# Patient Record
Sex: MALE | Race: OTHER | HISPANIC OR LATINO | URBAN - METROPOLITAN AREA
[De-identification: names, ages, dates, MRNs, and addresses within clinical notes are randomized per-mention and may not be internally consistent; named-entity substitution may affect disease eponyms.]

---

## 2017-06-10 ENCOUNTER — OUTPATIENT (OUTPATIENT)
Dept: OUTPATIENT SERVICES | Facility: HOSPITAL | Age: 77
LOS: 1 days | End: 2017-06-10
Payer: MEDICARE

## 2017-06-10 ENCOUNTER — APPOINTMENT (OUTPATIENT)
Dept: CT IMAGING | Facility: CLINIC | Age: 77
End: 2017-06-10

## 2017-06-10 DIAGNOSIS — D35.2 BENIGN NEOPLASM OF PITUITARY GLAND: ICD-10-CM

## 2017-06-10 DIAGNOSIS — Z87.19 PERSONAL HISTORY OF OTHER DISEASES OF THE DIGESTIVE SYSTEM: Chronic | ICD-10-CM

## 2017-06-10 DIAGNOSIS — I05.9 RHEUMATIC MITRAL VALVE DISEASE, UNSPECIFIED: Chronic | ICD-10-CM

## 2017-06-10 PROCEDURE — 70470 CT HEAD/BRAIN W/O & W/DYE: CPT

## 2017-06-10 PROCEDURE — 82565 ASSAY OF CREATININE: CPT

## 2017-06-15 ENCOUNTER — APPOINTMENT (OUTPATIENT)
Dept: NEUROSURGERY | Facility: CLINIC | Age: 77
End: 2017-06-15

## 2017-06-15 VITALS
HEART RATE: 63 BPM | DIASTOLIC BLOOD PRESSURE: 80 MMHG | BODY MASS INDEX: 32.04 KG/M2 | SYSTOLIC BLOOD PRESSURE: 142 MMHG | HEIGHT: 64.57 IN | OXYGEN SATURATION: 98 % | TEMPERATURE: 98 F | WEIGHT: 190 LBS

## 2017-06-15 DIAGNOSIS — Z86.39 PERSONAL HISTORY OF OTHER ENDOCRINE, NUTRITIONAL AND METABOLIC DISEASE: ICD-10-CM

## 2017-06-15 DIAGNOSIS — Z78.9 OTHER SPECIFIED HEALTH STATUS: ICD-10-CM

## 2019-01-18 ENCOUNTER — APPOINTMENT (OUTPATIENT)
Dept: NEUROSURGERY | Facility: CLINIC | Age: 79
End: 2019-01-18
Payer: MEDICARE

## 2019-01-18 VITALS
HEIGHT: 64 IN | WEIGHT: 190 LBS | TEMPERATURE: 97.5 F | BODY MASS INDEX: 32.44 KG/M2 | HEART RATE: 83 BPM | DIASTOLIC BLOOD PRESSURE: 66 MMHG | SYSTOLIC BLOOD PRESSURE: 163 MMHG

## 2019-01-18 PROCEDURE — 99214 OFFICE O/P EST MOD 30 MIN: CPT

## 2019-01-18 NOTE — REVIEW OF SYSTEMS
[Feeling Tired] : feeling tired [As Noted in HPI] : as noted in HPI [Negative] : Heme/Lymph [FreeTextEntry3] : cataract

## 2019-01-18 NOTE — ASSESSMENT
[FreeTextEntry1] : Mr. Rodriguez presents with above history.  His symptoms remain stable.  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  He will also obtain visual field testing as well.  He should followup thereafter for formal review.  He and his wife know to call the office if there are any new or worsening symptoms.

## 2019-01-18 NOTE — PHYSICAL EXAM
[General Appearance - Alert] : alert [General Appearance - In No Acute Distress] : in no acute distress [Oriented To Time, Place, And Person] : oriented to person, place, and time [Impaired Insight] : insight and judgment were intact [Over the Past 2 Weeks, Have You Felt Down, Depressed, or Hopeless?] : 1.) Over the past 2 weeks, have you felt down, depressed, or hopeless? No [Over the Past 2 Weeks, Have You Felt Little Interest or Pleasure Doing Things?] : 2.) Over the past 2 weeks, have you felt little interest or pleasure doing things? No [Person] : oriented to person [Place] : oriented to place [Time] : oriented to time [Concentration Intact] : normal concentrating ability [Comprehension] : comprehension intact [Cranial Nerves Optic (II)] : visual acuity intact bilaterally,  pupils equal round and reactive to light [Cranial Nerves Oculomotor (III)] : extraocular motion intact [Cranial Nerves Trigeminal (V)] : facial sensation intact symmetrically [Cranial Nerves Glossopharyngeal (IX)] : tongue and palate midline [Cranial Nerves Accessory (XI - Cranial And Spinal)] : head turning and shoulder shrug symmetric [Cranial Nerves Hypoglossal (XII)] : there was no tongue deviation with protrusion [Motor Tone] : muscle tone was normal in all four extremities [Motor Strength] : muscle strength was normal in all four extremities [Sensation Tactile Decrease] : light touch was intact [Sensation Pain / Temperature Decrease] : pain and temperature was intact [FreeTextEntry9] : ambulates with the assistance of a walker.  [No Visual Abnormalities] : no visible abnormailities

## 2019-01-18 NOTE — REASON FOR VISIT
[Follow-Up: _____] : a [unfilled] follow-up visit [FreeTextEntry1] : Mr. Rodriguez presents today for follow up visit.    At today's visit, he denies any headaches complaints, n/v. Visual disturbance include history of cataract surgery and wears glasses for reading. He has never had a seizure. He denies any numbness/tingling or weakness of the extremities. No difficulty with ambulation or balance disturbances. Visual field testing performed 3/2017. VF;s were stable at that time.  He has not been evaluated by endocrine for several years. Patient has a pacemaker. He is currently managed on Coumadin for anticoagulation. As per patient, he is doing well. His daughter Aida was on speaker phone during visit and agrees from her perspective there are no new concerns. \par

## 2019-01-30 ENCOUNTER — OUTPATIENT (OUTPATIENT)
Dept: OUTPATIENT SERVICES | Facility: HOSPITAL | Age: 79
LOS: 1 days | End: 2019-01-30
Payer: MEDICARE

## 2019-01-30 ENCOUNTER — APPOINTMENT (OUTPATIENT)
Dept: CT IMAGING | Facility: CLINIC | Age: 79
End: 2019-01-30
Payer: MEDICARE

## 2019-01-30 DIAGNOSIS — D35.2 BENIGN NEOPLASM OF PITUITARY GLAND: ICD-10-CM

## 2019-01-30 DIAGNOSIS — Z87.19 PERSONAL HISTORY OF OTHER DISEASES OF THE DIGESTIVE SYSTEM: Chronic | ICD-10-CM

## 2019-01-30 DIAGNOSIS — I05.9 RHEUMATIC MITRAL VALVE DISEASE, UNSPECIFIED: Chronic | ICD-10-CM

## 2019-01-30 PROCEDURE — 70470 CT HEAD/BRAIN W/O & W/DYE: CPT | Mod: 26

## 2019-01-30 PROCEDURE — 82565 ASSAY OF CREATININE: CPT

## 2019-01-30 PROCEDURE — 70470 CT HEAD/BRAIN W/O & W/DYE: CPT

## 2019-06-01 ENCOUNTER — OUTPATIENT (OUTPATIENT)
Dept: OUTPATIENT SERVICES | Facility: HOSPITAL | Age: 79
LOS: 1 days | End: 2019-06-01
Payer: MEDICARE

## 2019-06-01 DIAGNOSIS — I05.9 RHEUMATIC MITRAL VALVE DISEASE, UNSPECIFIED: Chronic | ICD-10-CM

## 2019-06-01 DIAGNOSIS — Z87.19 PERSONAL HISTORY OF OTHER DISEASES OF THE DIGESTIVE SYSTEM: Chronic | ICD-10-CM

## 2019-06-18 ENCOUNTER — EMERGENCY (EMERGENCY)
Facility: HOSPITAL | Age: 79
LOS: 1 days | Discharge: HOME | End: 2019-06-18
Attending: EMERGENCY MEDICINE
Payer: MEDICARE

## 2019-06-18 VITALS
HEART RATE: 60 BPM | RESPIRATION RATE: 20 BRPM | TEMPERATURE: 98 F | DIASTOLIC BLOOD PRESSURE: 73 MMHG | WEIGHT: 177.91 LBS | HEIGHT: 65 IN | OXYGEN SATURATION: 98 % | SYSTOLIC BLOOD PRESSURE: 132 MMHG

## 2019-06-18 DIAGNOSIS — Z87.19 PERSONAL HISTORY OF OTHER DISEASES OF THE DIGESTIVE SYSTEM: Chronic | ICD-10-CM

## 2019-06-18 DIAGNOSIS — I05.9 RHEUMATIC MITRAL VALVE DISEASE, UNSPECIFIED: Chronic | ICD-10-CM

## 2019-06-18 LAB
ALBUMIN SERPL ELPH-MCNC: 3.9 G/DL — SIGNIFICANT CHANGE UP (ref 3.3–5.2)
ALP SERPL-CCNC: 95 U/L — SIGNIFICANT CHANGE UP (ref 40–120)
ALT FLD-CCNC: 18 U/L — SIGNIFICANT CHANGE UP
ANION GAP SERPL CALC-SCNC: 10 MMOL/L — SIGNIFICANT CHANGE UP (ref 5–17)
APTT BLD: 48.6 SEC — HIGH (ref 27.5–36.3)
AST SERPL-CCNC: 24 U/L — SIGNIFICANT CHANGE UP
BILIRUB SERPL-MCNC: 0.8 MG/DL — SIGNIFICANT CHANGE UP (ref 0.4–2)
BUN SERPL-MCNC: 18 MG/DL — SIGNIFICANT CHANGE UP (ref 8–20)
CALCIUM SERPL-MCNC: 9.5 MG/DL — SIGNIFICANT CHANGE UP (ref 8.6–10.2)
CHLORIDE SERPL-SCNC: 100 MMOL/L — SIGNIFICANT CHANGE UP (ref 98–107)
CO2 SERPL-SCNC: 26 MMOL/L — SIGNIFICANT CHANGE UP (ref 22–29)
CREAT SERPL-MCNC: 1.03 MG/DL — SIGNIFICANT CHANGE UP (ref 0.5–1.3)
GLUCOSE SERPL-MCNC: 84 MG/DL — SIGNIFICANT CHANGE UP (ref 70–115)
HCT VFR BLD CALC: 43.1 % — SIGNIFICANT CHANGE UP (ref 42–52)
HGB BLD-MCNC: 13.7 G/DL — LOW (ref 14–18)
INR BLD: 2.98 RATIO — HIGH (ref 0.88–1.16)
MCHC RBC-ENTMCNC: 27.1 PG — SIGNIFICANT CHANGE UP (ref 27–31)
MCHC RBC-ENTMCNC: 31.8 G/DL — LOW (ref 32–36)
MCV RBC AUTO: 85.3 FL — SIGNIFICANT CHANGE UP (ref 80–94)
PLATELET # BLD AUTO: 183 K/UL — SIGNIFICANT CHANGE UP (ref 150–400)
POTASSIUM SERPL-MCNC: 4.1 MMOL/L — SIGNIFICANT CHANGE UP (ref 3.5–5.3)
POTASSIUM SERPL-SCNC: 4.1 MMOL/L — SIGNIFICANT CHANGE UP (ref 3.5–5.3)
PROT SERPL-MCNC: 7.7 G/DL — SIGNIFICANT CHANGE UP (ref 6.6–8.7)
PROTHROM AB SERPL-ACNC: 35.5 SEC — HIGH (ref 10–12.9)
RBC # BLD: 5.05 M/UL — SIGNIFICANT CHANGE UP (ref 4.6–6.2)
RBC # FLD: 15.7 % — HIGH (ref 11–15.6)
SODIUM SERPL-SCNC: 136 MMOL/L — SIGNIFICANT CHANGE UP (ref 135–145)
WBC # BLD: 6.2 K/UL — SIGNIFICANT CHANGE UP (ref 4.8–10.8)
WBC # FLD AUTO: 6.2 K/UL — SIGNIFICANT CHANGE UP (ref 4.8–10.8)

## 2019-06-18 PROCEDURE — 85027 COMPLETE CBC AUTOMATED: CPT

## 2019-06-18 PROCEDURE — 99284 EMERGENCY DEPT VISIT MOD MDM: CPT | Mod: 25

## 2019-06-18 PROCEDURE — 96374 THER/PROPH/DIAG INJ IV PUSH: CPT | Mod: XU

## 2019-06-18 PROCEDURE — 72131 CT LUMBAR SPINE W/O DYE: CPT | Mod: 26

## 2019-06-18 PROCEDURE — 71250 CT THORAX DX C-: CPT | Mod: 26

## 2019-06-18 PROCEDURE — 74174 CTA ABD&PLVS W/CONTRAST: CPT | Mod: 26

## 2019-06-18 PROCEDURE — 93971 EXTREMITY STUDY: CPT

## 2019-06-18 PROCEDURE — 93971 EXTREMITY STUDY: CPT | Mod: 26,LT

## 2019-06-18 PROCEDURE — 74174 CTA ABD&PLVS W/CONTRAST: CPT

## 2019-06-18 PROCEDURE — 72131 CT LUMBAR SPINE W/O DYE: CPT

## 2019-06-18 PROCEDURE — 71250 CT THORAX DX C-: CPT

## 2019-06-18 PROCEDURE — 85730 THROMBOPLASTIN TIME PARTIAL: CPT

## 2019-06-18 PROCEDURE — 72125 CT NECK SPINE W/O DYE: CPT | Mod: 26

## 2019-06-18 PROCEDURE — 72125 CT NECK SPINE W/O DYE: CPT

## 2019-06-18 PROCEDURE — 70450 CT HEAD/BRAIN W/O DYE: CPT

## 2019-06-18 PROCEDURE — 85610 PROTHROMBIN TIME: CPT

## 2019-06-18 PROCEDURE — 80053 COMPREHEN METABOLIC PANEL: CPT

## 2019-06-18 PROCEDURE — 73590 X-RAY EXAM OF LOWER LEG: CPT

## 2019-06-18 PROCEDURE — 73590 X-RAY EXAM OF LOWER LEG: CPT | Mod: 26,LT

## 2019-06-18 PROCEDURE — 99284 EMERGENCY DEPT VISIT MOD MDM: CPT

## 2019-06-18 PROCEDURE — 36415 COLL VENOUS BLD VENIPUNCTURE: CPT

## 2019-06-18 PROCEDURE — 70450 CT HEAD/BRAIN W/O DYE: CPT | Mod: 26

## 2019-06-18 RX ORDER — FENTANYL CITRATE 50 UG/ML
25 INJECTION INTRAVENOUS ONCE
Refills: 0 | Status: DISCONTINUED | OUTPATIENT
Start: 2019-06-18 | End: 2019-06-18

## 2019-06-18 RX ADMIN — FENTANYL CITRATE 25 MICROGRAM(S): 50 INJECTION INTRAVENOUS at 13:11

## 2019-06-18 NOTE — ED ADULT NURSE NOTE - INTEGUMENTARY WDL
Color consistent with ethnicity/race, left lower extremity discoloration, warm, dry intact, resilient.

## 2019-06-18 NOTE — ED STATDOCS - PROGRESS NOTE DETAILS
PA Note: PT evaluated by intake physician. HPI/PE/ROS as noted above. Will follow up plan per intake physician. PA Note: CT head/spine/chest and xray wnl, evidence of expanding abdominal aneurysm, will order CTangio to further evaluate. Pt with evidence of PA Note: CT angio showing aneurysm 4.7cm, pt informed of this and instructed on need to follow up with vascular surgeon. Pt acknowledged understanding. [place]: pt evaluated, states he feels improved. Abd is soft. Aneurysm < 5 cm and stable for outpt vascular f/u. Pt apparently reported top family that he had chest pain overnight, but he states, clearly and repeatedly, that he had back soreness upon deep inspiration, likely secondary to fall, stable for dc PA Note: CT head/spine/chest and xray wnl, evidence of expanding abdominal aneurysm, will order CTangio to further evaluate. Pt with evidence of pituitary macroadenoma which pt is aware of, follows up with endocrinologist for this.

## 2019-06-18 NOTE — ED STATDOCS - PMH
Afib    CHF (congestive heart failure)    HTN (hypertension)    Mitral valve disease    Respiratory insufficiency

## 2019-06-18 NOTE — ED STATDOCS - ATTENDING CONTRIBUTION TO CARE
IAllen, performed the initial face to face bedside interview with this patient regarding history of present illness, review of symptoms and relevant past medical, social and family history.  I completed an independent physical examination.  I was the initial provider who evaluated this patient. I have signed out the follow up of any pending tests (i.e. labs, radiological studies) to the ACP.  I have communicated the patient’s plan of care and disposition with the ACP. I, Nader Saravia, performed the initial face to face bedside interview with this patient regarding history of present illness, review of symptoms and relevant past medical, social and family history.  I completed an independent physical examination.  I was the initial provider who evaluated this patient. I have signed out the follow up of any pending tests (i.e. labs, radiological studies) to the ACP.  I have communicated the patient’s plan of care and disposition with the ACP.  The history, relevant review of systems, past medical and surgical history, medical decision making, and physical examination was documented by the scribe in my presence and I attest to the accuracy of the documentation.

## 2019-06-18 NOTE — ED ADULT NURSE NOTE - OBJECTIVE STATEMENT
patient fell at home from standing height last night. patient on coumadin and c/o head, neck and back pain. patient is alert and oriented x4, wife at the bedside. normally walks home with a walker. patient denies LOC, no sign of trauma. patient does also complain of right lower leg pain and inflammation with discoloration. MD Saravia aware.

## 2019-06-18 NOTE — ED STATDOCS - OBJECTIVE STATEMENT
79 y/o M w/ PMHx of AFib, HTN, pacemaker, mitral valve replacement and CHF presents to ED c/o neck and back pain s/p fall last night. Pt states he fell backwards hitting hit head yesterday and has continued pain this AM prompting him to visit the ED. Denies LOC, N/V, fever, chills, CP, SOB, or any other complaints at this time.

## 2019-06-18 NOTE — ED ADULT TRIAGE NOTE - CHIEF COMPLAINT QUOTE
fell last nigh while walking. fell down 2 steps, hit middle back and head. No bleeding.  No LOC. Takes coumadin, hit head during the fall.

## 2019-06-19 DIAGNOSIS — Z71.89 OTHER SPECIFIED COUNSELING: ICD-10-CM

## 2019-06-20 ENCOUNTER — APPOINTMENT (OUTPATIENT)
Dept: VASCULAR SURGERY | Facility: CLINIC | Age: 79
End: 2019-06-20
Payer: MEDICARE

## 2019-06-20 VITALS
SYSTOLIC BLOOD PRESSURE: 143 MMHG | TEMPERATURE: 97.8 F | OXYGEN SATURATION: 98 % | HEART RATE: 68 BPM | DIASTOLIC BLOOD PRESSURE: 70 MMHG | HEIGHT: 64 IN

## 2019-06-20 DIAGNOSIS — Z95.0 PRESENCE OF CARDIAC PACEMAKER: ICD-10-CM

## 2019-06-20 DIAGNOSIS — Z86.79 PERSONAL HISTORY OF OTHER DISEASES OF THE CIRCULATORY SYSTEM: ICD-10-CM

## 2019-06-20 DIAGNOSIS — Z84.1 FAMILY HISTORY OF DISORDERS OF KIDNEY AND URETER: ICD-10-CM

## 2019-06-20 DIAGNOSIS — Z82.49 FAMILY HISTORY OF ISCHEMIC HEART DISEASE AND OTHER DISEASES OF THE CIRCULATORY SYSTEM: ICD-10-CM

## 2019-06-20 PROCEDURE — 99203 OFFICE O/P NEW LOW 30 MIN: CPT

## 2019-06-21 PROBLEM — Z95.0 HISTORY OF CARDIAC PACEMAKER: Status: RESOLVED | Noted: 2019-06-20 | Resolved: 2019-06-21

## 2019-06-21 PROBLEM — Z82.49 FAMILY HISTORY OF CARDIAC PACEMAKER: Status: ACTIVE | Noted: 2019-06-20

## 2019-06-21 PROBLEM — Z86.79 HISTORY OF CARDIAC DISORDER: Status: RESOLVED | Noted: 2019-06-20 | Resolved: 2019-06-21

## 2019-06-21 PROBLEM — Z82.49 FAMILY HISTORY OF CARDIAC DISORDER: Status: ACTIVE | Noted: 2019-06-20

## 2019-06-21 PROBLEM — Z84.1 FAMILY HISTORY OF KIDNEY DISEASE: Status: ACTIVE | Noted: 2019-06-20

## 2019-06-21 RX ORDER — MULTIVIT-MIN/FOLIC/VIT K/LYCOP 400-300MCG
500 TABLET ORAL
Refills: 0 | Status: ACTIVE | COMMUNITY

## 2019-06-21 RX ORDER — GABAPENTIN 300 MG/1
300 CAPSULE ORAL
Refills: 0 | Status: ACTIVE | COMMUNITY

## 2019-06-21 RX ORDER — FERROUS SULFATE 325(65) MG
324 TABLET ORAL
Refills: 0 | Status: ACTIVE | COMMUNITY

## 2019-06-21 RX ORDER — DOCUSATE SODIUM 100 MG
100 TABLET ORAL
Refills: 0 | Status: ACTIVE | COMMUNITY

## 2019-06-21 RX ORDER — FUROSEMIDE 20 MG/1
20 TABLET ORAL
Refills: 0 | Status: ACTIVE | COMMUNITY

## 2019-06-21 RX ORDER — DIGOXIN 250 UG/1
250 TABLET ORAL
Refills: 0 | Status: ACTIVE | COMMUNITY

## 2019-06-21 RX ORDER — ERGOCALCIFEROL 1.25 MG/1
1.25 MG CAPSULE ORAL
Refills: 0 | Status: ACTIVE | COMMUNITY

## 2019-06-21 RX ORDER — PANTOPRAZOLE 40 MG/1
40 TABLET, DELAYED RELEASE ORAL
Refills: 0 | Status: ACTIVE | COMMUNITY

## 2019-06-21 NOTE — HISTORY OF PRESENT ILLNESS
[FreeTextEntry1] : 78 year old male referred for a AAA. He had a recent trip to 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 Family states, they knew about the aneurysm but previously did not follow up. He has an old noncontrast CT from 2015, which showed the AAA measuring 4cm in diameter. Updated CT now from June 2019 - measures 4.7cm.\par \par On questioning, he denies any hx of claudication. He has a prior remote stroke several years back and with residual significant L sided hemiparesis. He ambulates some with a walker, but functional status is limited. He denies any wounds or ulcers. Denies any leg pain at rest. Denies any back or abdominal pain. He also has a hx of afib, on coumadin. Also on ASA. Here for initial evaluation

## 2019-06-21 NOTE — PHYSICAL EXAM
[Normal Rate and Rhythm] : normal rate and rhythm [Respiratory Effort] : normal respiratory effort [0] : left 0 [Ankle Swelling (On Exam)] : present [Ankle Swelling On The Left] : moderate [] : bilaterally [Ankle Swelling On The Right] : mild [No HSM] : no hepatosplenomegaly [Alert] : alert [Oriented to Person] : oriented to person [Oriented to Place] : oriented to place [Oriented to Time] : oriented to time [Calm] : calm [JVD] : no jugular venous distention  [Varicose Veins Of Lower Extremities] : not present [Carotid Bruits] : no carotid bruits [Abdomen Masses] : No abdominal masses [Abdominal Bruit] : no abdominal bruit  [Abdomen Tenderness] : ~T ~M No abdominal tenderness [de-identified] : soft, nt/nd [FreeTextEntry1] : Monophasic pedal signals heard bilaterally [de-identified] : NAD, obese [de-identified] : AVIS VIVAS [de-identified] : LLE weakness/paresis c/w his prior stroke [de-identified] : no ulcers or lesions. LLE with mild to moderate edema with some hyperpigmentation present c/w venous stasis. R side also with stasis changes but free of edema. No ulcers or weeping. Feet warm bilaterally with signals present. Absent BLE pulses as noted above

## 2020-01-21 ENCOUNTER — APPOINTMENT (OUTPATIENT)
Dept: VASCULAR SURGERY | Facility: CLINIC | Age: 80
End: 2020-01-21
Payer: MEDICARE

## 2020-01-21 VITALS
OXYGEN SATURATION: 98 % | HEIGHT: 64 IN | DIASTOLIC BLOOD PRESSURE: 71 MMHG | TEMPERATURE: 98.1 F | SYSTOLIC BLOOD PRESSURE: 151 MMHG | HEART RATE: 65 BPM

## 2020-01-21 DIAGNOSIS — Z09 ENCOUNTER FOR FOLLOW-UP EXAMINATION AFTER COMPLETED TREATMENT FOR CONDITIONS OTHER THAN MALIGNANT NEOPLASM: ICD-10-CM

## 2020-01-21 DIAGNOSIS — I48.91 UNSPECIFIED ATRIAL FIBRILLATION: ICD-10-CM

## 2020-01-21 DIAGNOSIS — K21.9 GASTRO-ESOPHAGEAL REFLUX DISEASE W/OUT ESOPHAGITIS: ICD-10-CM

## 2020-01-21 DIAGNOSIS — I10 ESSENTIAL (PRIMARY) HYPERTENSION: ICD-10-CM

## 2020-01-21 PROCEDURE — 93978 VASCULAR STUDY: CPT

## 2020-01-21 PROCEDURE — 99213 OFFICE O/P EST LOW 20 MIN: CPT

## 2020-01-24 ENCOUNTER — FORM ENCOUNTER (OUTPATIENT)
Age: 80
End: 2020-01-24

## 2020-01-25 ENCOUNTER — OUTPATIENT (OUTPATIENT)
Dept: OUTPATIENT SERVICES | Facility: HOSPITAL | Age: 80
LOS: 1 days | End: 2020-01-25
Payer: MEDICARE

## 2020-01-25 ENCOUNTER — APPOINTMENT (OUTPATIENT)
Dept: CT IMAGING | Facility: CLINIC | Age: 80
End: 2020-01-25
Payer: MEDICARE

## 2020-01-25 DIAGNOSIS — Z00.8 ENCOUNTER FOR OTHER GENERAL EXAMINATION: ICD-10-CM

## 2020-01-25 DIAGNOSIS — Z87.19 PERSONAL HISTORY OF OTHER DISEASES OF THE DIGESTIVE SYSTEM: Chronic | ICD-10-CM

## 2020-01-25 DIAGNOSIS — I05.9 RHEUMATIC MITRAL VALVE DISEASE, UNSPECIFIED: Chronic | ICD-10-CM

## 2020-01-25 DIAGNOSIS — I48.91 UNSPECIFIED ATRIAL FIBRILLATION: ICD-10-CM

## 2020-01-25 DIAGNOSIS — I71.4 ABDOMINAL AORTIC ANEURYSM, WITHOUT RUPTURE: ICD-10-CM

## 2020-01-25 PROCEDURE — 75635 CT ANGIO ABDOMINAL ARTERIES: CPT

## 2020-01-25 PROCEDURE — 75635 CT ANGIO ABDOMINAL ARTERIES: CPT | Mod: 26

## 2020-03-18 ENCOUNTER — INPATIENT (INPATIENT)
Facility: HOSPITAL | Age: 80
LOS: 13 days | Discharge: NURSING, REHAB OR LONG-TERM CARE | DRG: 177 | End: 2020-04-01
Attending: INTERNAL MEDICINE | Admitting: HOSPITALIST
Payer: MEDICARE

## 2020-03-18 VITALS
RESPIRATION RATE: 18 BRPM | OXYGEN SATURATION: 95 % | HEART RATE: 66 BPM | DIASTOLIC BLOOD PRESSURE: 84 MMHG | TEMPERATURE: 97 F | SYSTOLIC BLOOD PRESSURE: 163 MMHG

## 2020-03-18 DIAGNOSIS — Z87.19 PERSONAL HISTORY OF OTHER DISEASES OF THE DIGESTIVE SYSTEM: Chronic | ICD-10-CM

## 2020-03-18 DIAGNOSIS — B34.2 CORONAVIRUS INFECTION, UNSPECIFIED: ICD-10-CM

## 2020-03-18 DIAGNOSIS — I05.9 RHEUMATIC MITRAL VALVE DISEASE, UNSPECIFIED: Chronic | ICD-10-CM

## 2020-03-18 LAB
ALBUMIN SERPL ELPH-MCNC: 3.5 G/DL — SIGNIFICANT CHANGE UP (ref 3.3–5.2)
ALP SERPL-CCNC: 68 U/L — SIGNIFICANT CHANGE UP (ref 40–120)
ALT FLD-CCNC: 31 U/L — SIGNIFICANT CHANGE UP
ANION GAP SERPL CALC-SCNC: 14 MMOL/L — SIGNIFICANT CHANGE UP (ref 5–17)
APTT BLD: 47.2 SEC — HIGH (ref 27.5–36.3)
AST SERPL-CCNC: 74 U/L — HIGH
BASOPHILS # BLD AUTO: 0.01 K/UL — SIGNIFICANT CHANGE UP (ref 0–0.2)
BASOPHILS NFR BLD AUTO: 0.2 % — SIGNIFICANT CHANGE UP (ref 0–2)
BILIRUB SERPL-MCNC: 0.9 MG/DL — SIGNIFICANT CHANGE UP (ref 0.4–2)
BUN SERPL-MCNC: 28 MG/DL — HIGH (ref 8–20)
CALCIUM SERPL-MCNC: 8.3 MG/DL — LOW (ref 8.6–10.2)
CHLORIDE SERPL-SCNC: 92 MMOL/L — LOW (ref 98–107)
CO2 SERPL-SCNC: 21 MMOL/L — LOW (ref 22–29)
CREAT SERPL-MCNC: 1.4 MG/DL — HIGH (ref 0.5–1.3)
EOSINOPHIL # BLD AUTO: 0 K/UL — SIGNIFICANT CHANGE UP (ref 0–0.5)
EOSINOPHIL NFR BLD AUTO: 0 % — SIGNIFICANT CHANGE UP (ref 0–6)
GLUCOSE SERPL-MCNC: 97 MG/DL — SIGNIFICANT CHANGE UP (ref 70–99)
HCT VFR BLD CALC: 44.9 % — SIGNIFICANT CHANGE UP (ref 39–50)
HGB BLD-MCNC: 14.6 G/DL — SIGNIFICANT CHANGE UP (ref 13–17)
IMM GRANULOCYTES NFR BLD AUTO: 0.5 % — SIGNIFICANT CHANGE UP (ref 0–1.5)
INR BLD: 2.45 RATIO — HIGH (ref 0.88–1.16)
LYMPHOCYTES # BLD AUTO: 0.88 K/UL — LOW (ref 1–3.3)
LYMPHOCYTES # BLD AUTO: 13.8 % — SIGNIFICANT CHANGE UP (ref 13–44)
MCHC RBC-ENTMCNC: 27.3 PG — SIGNIFICANT CHANGE UP (ref 27–34)
MCHC RBC-ENTMCNC: 32.5 GM/DL — SIGNIFICANT CHANGE UP (ref 32–36)
MCV RBC AUTO: 83.9 FL — SIGNIFICANT CHANGE UP (ref 80–100)
MONOCYTES # BLD AUTO: 0.45 K/UL — SIGNIFICANT CHANGE UP (ref 0–0.9)
MONOCYTES NFR BLD AUTO: 7.1 % — SIGNIFICANT CHANGE UP (ref 2–14)
NEUTROPHILS # BLD AUTO: 5 K/UL — SIGNIFICANT CHANGE UP (ref 1.8–7.4)
NEUTROPHILS NFR BLD AUTO: 78.4 % — HIGH (ref 43–77)
PLATELET # BLD AUTO: 132 K/UL — LOW (ref 150–400)
POTASSIUM SERPL-MCNC: 4.3 MMOL/L — SIGNIFICANT CHANGE UP (ref 3.5–5.3)
POTASSIUM SERPL-SCNC: 4.3 MMOL/L — SIGNIFICANT CHANGE UP (ref 3.5–5.3)
PROT SERPL-MCNC: 7.3 G/DL — SIGNIFICANT CHANGE UP (ref 6.6–8.7)
PROTHROM AB SERPL-ACNC: 28.5 SEC — HIGH (ref 10–12.9)
RBC # BLD: 5.35 M/UL — SIGNIFICANT CHANGE UP (ref 4.2–5.8)
RBC # FLD: 15.5 % — HIGH (ref 10.3–14.5)
SODIUM SERPL-SCNC: 127 MMOL/L — LOW (ref 135–145)
WBC # BLD: 6.37 K/UL — SIGNIFICANT CHANGE UP (ref 3.8–10.5)
WBC # FLD AUTO: 6.37 K/UL — SIGNIFICANT CHANGE UP (ref 3.8–10.5)

## 2020-03-18 PROCEDURE — 70450 CT HEAD/BRAIN W/O DYE: CPT | Mod: 26

## 2020-03-18 PROCEDURE — 99285 EMERGENCY DEPT VISIT HI MDM: CPT

## 2020-03-18 PROCEDURE — 93010 ELECTROCARDIOGRAM REPORT: CPT

## 2020-03-18 PROCEDURE — 99223 1ST HOSP IP/OBS HIGH 75: CPT

## 2020-03-18 PROCEDURE — 72125 CT NECK SPINE W/O DYE: CPT | Mod: 26

## 2020-03-18 RX ORDER — METOPROLOL TARTRATE 50 MG
25 TABLET ORAL EVERY 12 HOURS
Refills: 0 | Status: DISCONTINUED | OUTPATIENT
Start: 2020-03-18 | End: 2020-04-01

## 2020-03-18 RX ORDER — ACETAMINOPHEN 500 MG
650 TABLET ORAL EVERY 4 HOURS
Refills: 0 | Status: DISCONTINUED | OUTPATIENT
Start: 2020-03-18 | End: 2020-03-19

## 2020-03-18 RX ORDER — SODIUM CHLORIDE 9 MG/ML
1000 INJECTION INTRAMUSCULAR; INTRAVENOUS; SUBCUTANEOUS
Refills: 0 | Status: DISCONTINUED | OUTPATIENT
Start: 2020-03-18 | End: 2020-03-19

## 2020-03-18 RX ORDER — WARFARIN SODIUM 2.5 MG/1
6 TABLET ORAL ONCE
Refills: 0 | Status: COMPLETED | OUTPATIENT
Start: 2020-03-18 | End: 2020-03-18

## 2020-03-18 RX ORDER — SODIUM CHLORIDE 9 MG/ML
1000 INJECTION INTRAMUSCULAR; INTRAVENOUS; SUBCUTANEOUS ONCE
Refills: 0 | Status: COMPLETED | OUTPATIENT
Start: 2020-03-18 | End: 2020-03-18

## 2020-03-18 RX ADMIN — SODIUM CHLORIDE 100 MILLILITER(S): 9 INJECTION INTRAMUSCULAR; INTRAVENOUS; SUBCUTANEOUS at 18:29

## 2020-03-18 RX ADMIN — Medication 650 MILLIGRAM(S): at 21:50

## 2020-03-18 RX ADMIN — SODIUM CHLORIDE 100 MILLILITER(S): 9 INJECTION INTRAMUSCULAR; INTRAVENOUS; SUBCUTANEOUS at 22:55

## 2020-03-18 RX ADMIN — Medication 25 MILLIGRAM(S): at 22:55

## 2020-03-18 RX ADMIN — SODIUM CHLORIDE 1000 MILLILITER(S): 9 INJECTION INTRAMUSCULAR; INTRAVENOUS; SUBCUTANEOUS at 17:45

## 2020-03-18 RX ADMIN — Medication 650 MILLIGRAM(S): at 20:35

## 2020-03-18 RX ADMIN — WARFARIN SODIUM 6 MILLIGRAM(S): 2.5 TABLET ORAL at 22:54

## 2020-03-18 NOTE — H&P ADULT - HISTORY OF PRESENT ILLNESS
79yr old male with PMH of HTN, Afib, Mechanical Mitral valve on warfarin, CHF, CVA presented to ER after he slipped out of bed, felt weak. He was seen in ER 3 days prior for for flu-like- subjective fever, myalgias and cough x 3 days, also with diarrhea. He was diagnosed COVID-19 positive, wife also admitted to hospital with the same. He was discharged home with appropriate instructions. In ER, he is HD stable, denies any fever, chills, cough. Has mild diarrhea without any abdominal pain. was found to have OMAYRA and Hyponatremia. IMaging including Ct head, neck - neg for acute findings. He is now being admitted for further management.

## 2020-03-18 NOTE — ED ADULT NURSE NOTE - OBJECTIVE STATEMENT
pt biba from home s/p slipping out of bed this morning, + pain and tenderness to his lower back. denies loc or hitting head, no active bleeding noted. + coumadin for afib. recently dx with Covid-19 3 days ago, wife upstairs in ICU + covid-19.

## 2020-03-18 NOTE — H&P ADULT - NSHPPHYSICALEXAM_GEN_ALL_CORE
PHYSICAL EXAM:    GENERAL: NAD  HEAD:  Atraumatic, Normocephalic  EYES: EOMI, PERRLA, conjunctiva and sclera clear  ENMT: dry mucous membranes No lesions  NECK: Supple, No JVD  NERVOUS SYSTEM:  Alert & Oriented X3, Good concentration; Motor Strength 5/5 B/L upper and lower extremities;   CHEST/LUNG: Clear to percussion bilaterally; No rales, rhonchi  HEART: Regular rate and rhythm; No murmurs  ABDOMEN: Soft, Nontender, Nondistended; Bowel sounds present  EXTREMITIES:  No clubbing, cyanosis, or edema  SKIN: No rashes or lesions

## 2020-03-18 NOTE — H&P ADULT - ASSESSMENT
79yr old male with PMH of HTN, Afib, Mechanical Mitral valve, CHF, CVA presented to ER after he slipped out of bed, felt weak. He was seen in ER 3 days prior for for flu-like- subjective  He was diagnosed COVID-19 positive, wife also admitted to hospital with the same. He was discharged home with appropriate instructions. In ER, he is HD stable, was found to have OMAYRA and Hyponatremia. He is now being admitted for further management.      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

## 2020-03-18 NOTE — H&P ADULT - NSICDXPASTMEDICALHX_GEN_ALL_CORE_FT
PAST MEDICAL HISTORY:  Afib     CHF (congestive heart failure)     HTN (hypertension)     Mitral valve disease     Respiratory insufficiency

## 2020-03-18 NOTE — CONSULT NOTE ADULT - SUBJECTIVE AND OBJECTIVE BOX
REASON FOR CONSULT: telehospitalist evaluation    Outpatient physicians:     PCP Dr. Dax Santillan    HPI:     Patient is a 78 y/o man with HTN, CHF, a fib, CVA who presents with weakness and slipping out of bed. Was recently discharged home after being     PMH:     PSH:     Social Hx: former smoker, denies alcohol    Family Hx:     Meds:         Allergies: NKDA          OBJECTIVE    Vital Signs Last 24 Hrs  T(C): 36.2 (18 Mar 2020 12:30), Max: 36.2 (18 Mar 2020 12:30)  T(F): 97.2 (18 Mar 2020 12:30), Max: 97.2 (18 Mar 2020 12:30)  HR: 66 (18 Mar 2020 12:30) (66 - 66)  BP: 163/84 (18 Mar 2020 12:30) (163/84 - 163/84)  BP(mean): --  RR: 18 (18 Mar 2020 12:30) (18 - 18)  SpO2: 95% (18 Mar 2020 12:30) (95% - 95%)        PHYSICAL EXAM: Tele-evaluation precludes physical exam.       LABS AND IMAGING DATA:                        14.6   6.37  )-----------( 132      ( 18 Mar 2020 13:09 )             44.9     03-18    127<L>  |  92<L>  |  28.0<H>  ----------------------------<  97  4.3   |  21.0<L>  |  1.40<H>    Ca    8.3<L>      18 Mar 2020 13:09    TPro  7.3  /  Alb  3.5  /  TBili  0.9  /  DBili  x   /  AST  74<H>  /  ALT  31  /  AlkPhos  68  03-18                ASSESSMENT AND PLAN:             Care plan discussed with REASON FOR CONSULT: telehospitalist evaluation    Outpatient physicians:     PCP Dr. Dax Santillan    HPI:     Patient is a 78 y/o man with HTN, CHF, mechanical MVR, a fib, CVA who presents with weakness and slipping out of bed. Was recently discharged home from ED 2 days ago for flu-like symptoms, and whose wife has Covid-19. His Covid PCR test at that time was also positive.     At time of interview, patient reports he is generally feeling well. Reports mild cough. Denies any pain. Denies any SOB. Denies F/C. Denies other complaints.    PMH: as above    PSH: hernia repair, mechanical MVR    Social Hx: former smoker, denies alcohol    Family Hx: NC    Meds: patient does not know his medication list        Allergies: NKDA          OBJECTIVE    Vital Signs Last 24 Hrs  T(C): 36.2 (18 Mar 2020 12:30), Max: 36.2 (18 Mar 2020 12:30)  T(F): 97.2 (18 Mar 2020 12:30), Max: 97.2 (18 Mar 2020 12:30)  HR: 66 (18 Mar 2020 12:30) (66 - 66)  BP: 163/84 (18 Mar 2020 12:30) (163/84 - 163/84)  BP(mean): --  RR: 18 (18 Mar 2020 12:30) (18 - 18)  SpO2: 95% (18 Mar 2020 12:30) (95% - 95%)        PHYSICAL EXAM: Tele-evaluation precludes physical exam.       LABS AND IMAGING DATA:                        14.6   6.37  )-----------( 132      ( 18 Mar 2020 13:09 )             44.9     03-18    127<L>  |  92<L>  |  28.0<H>  ----------------------------<  97  4.3   |  21.0<L>  |  1.40<H>    Ca    8.3<L>      18 Mar 2020 13:09    TPro  7.3  /  Alb  3.5  /  TBili  0.9  /  DBili  x   /  AST  74<H>  /  ALT  31  /  AlkPhos  68  03-18      < from: CT Head No Cont (03.18.20 @ 11:25) >  IMPRESSION:     CT head:   1.  No acute intracranial hemorrhage.  2.  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     CT cervical spine: No evidence for acute displaced fracture or malalignment.     < end of copied text >            ASSESSMENT AND PLAN:     78 y/o man with HTN, CHF, mechanical MVR, a fib, CVA who presents with weakness and slipping out of bed.     *Weakness: likely sequela of Covid-19 infection  - supportive care  - Tylenol PRN pain, fever  - satting well on room air  - OOBTC  - incentive spirometer  - PT consult    *OMAYRA: likely volume depletion  - 1 L NS ordered by ED, followed by NS at 100 cc/hr  - monitor Cr - baseline was 0.96 several days ago  - avoid nephrotoxins    *CV: HTN, reported CHF, mechanical MVR on coumadin  - goal INR 2.5 - 3.5  - patient unable to recall his medications, gave number for daughter who is not picking up  - need to obtain home medication list  - in 2015 was on coumadin 6 mg daily    *FEN/GI: cardiac diet      Care plan discussed with Dr. Davila

## 2020-03-18 NOTE — ED ADULT TRIAGE NOTE - CHIEF COMPLAINT QUOTE
Patient BIBA, as per EMS family at home stated patient "slid" out of bed and hit left shoulder on bed rail, on coumadin, recently in ED for flu like symptoms and tested positive for COVID-19, patient evaluated by Dr Lara upon ED arrival and sent directed to isolation room

## 2020-03-18 NOTE — ED PROVIDER NOTE - OBJECTIVE STATEMENT
Translation provided by ED .  79M h/o afib on coumadin, CVA, CHF, HTN, diagnosed with COVID 3 days ago presents after slipping out of bed. Pt states that he was trying to get out of bed but slipped. Originally had back pain but since resolved. Currently denies any pain. Denies fever, back pain, nausea, vomiting, CP, SOB, joint pain, head trauma, LOC.

## 2020-03-18 NOTE — ED PROVIDER NOTE - PHYSICAL EXAMINATION
Gen: Well appearing in NAD  Head: NC/AT  Neck: trachea midline  Resp:  No distress, CTAB  CV: RRR  GI: soft, NTND  Ext: no deformities, moving all extremities, no bony ttp. No midline or paraspinal ttp.   Neuro:  CN 2-12 intact, No tremors. No fasciculations. No nystagmus. Normal finger to nose b/l. No dysmetria. Normal sensation.strength.   Skin:  Warm and dry as visualized  Psych:  Normal affect and mood

## 2020-03-18 NOTE — ED ADULT NURSE NOTE - NSIMPLEMENTINTERV_GEN_ALL_ED
Implemented All Fall with Harm Risk Interventions:  [place] to call system. Call bell, personal items and telephone within reach. Instruct patient to call for assistance. Room bathroom lighting operational. Non-slip footwear when patient is off stretcher. Physically safe environment: no spills, clutter or unnecessary equipment. Stretcher in lowest position, wheels locked, appropriate side rails in place. Provide visual cue, wrist band, yellow gown, etc. Monitor gait and stability. Monitor for mental status changes and reorient to person, place, and time. Review medications for side effects contributing to fall risk. Reinforce activity limits and safety measures with patient and family. Provide visual clues: red socks.

## 2020-03-19 LAB
ALBUMIN SERPL ELPH-MCNC: 3.3 G/DL — SIGNIFICANT CHANGE UP (ref 3.3–5.2)
ALP SERPL-CCNC: 65 U/L — SIGNIFICANT CHANGE UP (ref 40–120)
ALT FLD-CCNC: 31 U/L — SIGNIFICANT CHANGE UP
ANION GAP SERPL CALC-SCNC: 14 MMOL/L — SIGNIFICANT CHANGE UP (ref 5–17)
APTT BLD: 60.7 SEC — HIGH (ref 27.5–36.3)
AST SERPL-CCNC: 71 U/L — HIGH
BASOPHILS # BLD AUTO: 0.01 K/UL — SIGNIFICANT CHANGE UP (ref 0–0.2)
BASOPHILS NFR BLD AUTO: 0.2 % — SIGNIFICANT CHANGE UP (ref 0–2)
BILIRUB SERPL-MCNC: 0.7 MG/DL — SIGNIFICANT CHANGE UP (ref 0.4–2)
BUN SERPL-MCNC: 23 MG/DL — HIGH (ref 8–20)
CALCIUM SERPL-MCNC: 8.2 MG/DL — LOW (ref 8.6–10.2)
CHLORIDE SERPL-SCNC: 97 MMOL/L — LOW (ref 98–107)
CO2 SERPL-SCNC: 21 MMOL/L — LOW (ref 22–29)
CREAT SERPL-MCNC: 1.19 MG/DL — SIGNIFICANT CHANGE UP (ref 0.5–1.3)
EOSINOPHIL # BLD AUTO: 0 K/UL — SIGNIFICANT CHANGE UP (ref 0–0.5)
EOSINOPHIL NFR BLD AUTO: 0 % — SIGNIFICANT CHANGE UP (ref 0–6)
GLUCOSE SERPL-MCNC: 78 MG/DL — SIGNIFICANT CHANGE UP (ref 70–99)
HCT VFR BLD CALC: 47 % — SIGNIFICANT CHANGE UP (ref 39–50)
HGB BLD-MCNC: 15.1 G/DL — SIGNIFICANT CHANGE UP (ref 13–17)
IMM GRANULOCYTES NFR BLD AUTO: 0.4 % — SIGNIFICANT CHANGE UP (ref 0–1.5)
INR BLD: 4.02 RATIO — HIGH (ref 0.88–1.16)
LYMPHOCYTES # BLD AUTO: 1.14 K/UL — SIGNIFICANT CHANGE UP (ref 1–3.3)
LYMPHOCYTES # BLD AUTO: 21.9 % — SIGNIFICANT CHANGE UP (ref 13–44)
MCHC RBC-ENTMCNC: 27.5 PG — SIGNIFICANT CHANGE UP (ref 27–34)
MCHC RBC-ENTMCNC: 32.1 GM/DL — SIGNIFICANT CHANGE UP (ref 32–36)
MCV RBC AUTO: 85.5 FL — SIGNIFICANT CHANGE UP (ref 80–100)
MONOCYTES # BLD AUTO: 0.37 K/UL — SIGNIFICANT CHANGE UP (ref 0–0.9)
MONOCYTES NFR BLD AUTO: 7.1 % — SIGNIFICANT CHANGE UP (ref 2–14)
NEUTROPHILS # BLD AUTO: 3.66 K/UL — SIGNIFICANT CHANGE UP (ref 1.8–7.4)
NEUTROPHILS NFR BLD AUTO: 70.4 % — SIGNIFICANT CHANGE UP (ref 43–77)
PLATELET # BLD AUTO: 129 K/UL — LOW (ref 150–400)
POTASSIUM SERPL-MCNC: 4.8 MMOL/L — SIGNIFICANT CHANGE UP (ref 3.5–5.3)
POTASSIUM SERPL-SCNC: 4.8 MMOL/L — SIGNIFICANT CHANGE UP (ref 3.5–5.3)
PROT SERPL-MCNC: 7.2 G/DL — SIGNIFICANT CHANGE UP (ref 6.6–8.7)
PROTHROM AB SERPL-ACNC: 47.4 SEC — HIGH (ref 10–12.9)
RBC # BLD: 5.5 M/UL — SIGNIFICANT CHANGE UP (ref 4.2–5.8)
RBC # FLD: 15.8 % — HIGH (ref 10.3–14.5)
SODIUM SERPL-SCNC: 132 MMOL/L — LOW (ref 135–145)
TROPONIN T SERPL-MCNC: 0.01 NG/ML — SIGNIFICANT CHANGE UP (ref 0–0.06)
WBC # BLD: 5.2 K/UL — SIGNIFICANT CHANGE UP (ref 3.8–10.5)
WBC # FLD AUTO: 5.2 K/UL — SIGNIFICANT CHANGE UP (ref 3.8–10.5)

## 2020-03-19 PROCEDURE — 99233 SBSQ HOSP IP/OBS HIGH 50: CPT

## 2020-03-19 PROCEDURE — 99222 1ST HOSP IP/OBS MODERATE 55: CPT

## 2020-03-19 RX ORDER — LEVOTHYROXINE SODIUM 125 MCG
50 TABLET ORAL DAILY
Refills: 0 | Status: DISCONTINUED | OUTPATIENT
Start: 2020-03-19 | End: 2020-04-01

## 2020-03-19 RX ORDER — ASCORBIC ACID 60 MG
1500 TABLET,CHEWABLE ORAL
Refills: 0 | Status: COMPLETED | OUTPATIENT
Start: 2020-03-19 | End: 2020-03-23

## 2020-03-19 RX ORDER — DIGOXIN 250 MCG
0.25 TABLET ORAL DAILY
Refills: 0 | Status: DISCONTINUED | OUTPATIENT
Start: 2020-03-19 | End: 2020-04-01

## 2020-03-19 RX ORDER — LISINOPRIL 2.5 MG/1
20 TABLET ORAL DAILY
Refills: 0 | Status: DISCONTINUED | OUTPATIENT
Start: 2020-03-19 | End: 2020-03-22

## 2020-03-19 RX ORDER — HYDROXYCHLOROQUINE SULFATE 200 MG
400 TABLET ORAL EVERY 12 HOURS
Refills: 0 | Status: COMPLETED | OUTPATIENT
Start: 2020-03-19 | End: 2020-03-20

## 2020-03-19 RX ORDER — HYDROXYCHLOROQUINE SULFATE 200 MG
200 TABLET ORAL
Refills: 0 | Status: DISCONTINUED | OUTPATIENT
Start: 2020-03-19 | End: 2020-03-19

## 2020-03-19 RX ORDER — HYDROXYCHLOROQUINE SULFATE 200 MG
200 TABLET ORAL EVERY 12 HOURS
Refills: 0 | Status: COMPLETED | OUTPATIENT
Start: 2020-03-20 | End: 2020-03-23

## 2020-03-19 RX ORDER — ASCORBIC ACID 60 MG
1500 TABLET,CHEWABLE ORAL
Refills: 0 | Status: DISCONTINUED | OUTPATIENT
Start: 2020-03-19 | End: 2020-03-19

## 2020-03-19 RX ORDER — ACETAMINOPHEN 500 MG
650 TABLET ORAL EVERY 6 HOURS
Refills: 0 | Status: DISCONTINUED | OUTPATIENT
Start: 2020-03-19 | End: 2020-04-01

## 2020-03-19 RX ADMIN — Medication 25 MILLIGRAM(S): at 06:15

## 2020-03-19 RX ADMIN — Medication 153 MILLIGRAM(S): at 23:35

## 2020-03-19 RX ADMIN — Medication 0.25 MILLIGRAM(S): at 17:02

## 2020-03-19 RX ADMIN — Medication 650 MILLIGRAM(S): at 09:15

## 2020-03-19 RX ADMIN — Medication 153 MILLIGRAM(S): at 17:01

## 2020-03-19 RX ADMIN — Medication 25 MILLIGRAM(S): at 17:02

## 2020-03-19 RX ADMIN — Medication 400 MILLIGRAM(S): at 17:02

## 2020-03-19 RX ADMIN — Medication 650 MILLIGRAM(S): at 08:16

## 2020-03-19 RX ADMIN — LISINOPRIL 20 MILLIGRAM(S): 2.5 TABLET ORAL at 17:02

## 2020-03-19 RX ADMIN — Medication 153 MILLIGRAM(S): at 12:03

## 2020-03-19 RX ADMIN — Medication 650 MILLIGRAM(S): at 21:56

## 2020-03-19 RX ADMIN — Medication 650 MILLIGRAM(S): at 20:56

## 2020-03-19 NOTE — PROGRESS NOTE ADULT - SUBJECTIVE AND OBJECTIVE BOX
Patient is a 79y old  Male who presents with a chief complaint of Fall (18 Mar 2020 18:41)      Patient seen and examined at bedside.  [id number]    ALLERGIES:  No Known Allergies    MEDICATIONS  (STANDING):  ascorbic acid IVPB 1500 milliGRAM(s) IV Intermittent <User Schedule>  digoxin     Tablet 0.25 milliGRAM(s) Oral daily  hydroxychloroquine 200 milliGRAM(s) Oral two times a day  hydroxychloroquine 400 milliGRAM(s) Oral every 12 hours  levothyroxine 50 MICROGram(s) Oral daily  lisinopril 20 milliGRAM(s) Oral daily  metoprolol tartrate 25 milliGRAM(s) Oral every 12 hours    MEDICATIONS  (PRN):  acetaminophen   Tablet .. 650 milliGRAM(s) Oral every 6 hours PRN Temp greater or equal to 38C (100.4F), Mild Pain (1 - 3)    Vital Signs Last 24 Hrs  T(F): 98.1 (19 Mar 2020 12:09), Max: 100.2 (19 Mar 2020 08:17)  HR: 70 (19 Mar 2020 08:17) (61 - 77)  BP: 148/63 (19 Mar 2020 08:17) (122/49 - 152/80)  RR: 22 (19 Mar 2020 08:17) (18 - 22)  SpO2: 95% (19 Mar 2020 08:17) (95% - 97%)  I&O's Summary    18 Mar 2020 07:01  -  19 Mar 2020 07:00  --------------------------------------------------------  IN: 1240 mL / OUT: 501 mL / NET: 739 mL    19 Mar 2020 07:01  -  19 Mar 2020 13:37  --------------------------------------------------------  IN: 153 mL / OUT: 0 mL / NET: 153 mL      PHYSICAL EXAM:  General: NAD, A/O x 3; elderly  ENT: MMM, no thrush  Neck: Supple, No JVD  Lungs: Clear to auscultation bilaterally, good air entry, non-labored breathing  Cardio: +S1/S2, No pitting edema  Abdomen: Soft, Nontender, Nondistended; Bowel sounds present  Extremities: No calf tenderness    LABS:                        15.1   5.20  )-----------( 129      ( 19 Mar 2020 07:37 )             47.0     03-19    132  |  97  |  23.0  ----------------------------<  78  4.8   |  21.0  |  1.19    Ca    8.2      19 Mar 2020 07:37    TPro  7.2  /  Alb  3.3  /  TBili  0.7  /  DBili  x   /  AST  71  /  ALT  31  /  AlkPhos  65  03-19    eGFR if Non African American: 58 mL/min/1.73M2 (03-19-20 @ 07:37)  eGFR if : 67 mL/min/1.73M2 (03-19-20 @ 07:37)    PT/INR - ( 18 Mar 2020 13:09 )   PT: 28.5 sec;   INR: 2.45 ratio    PTT - ( 18 Mar 2020 13:09 )  PTT:47.2 sec    CARDIAC MARKERS ( 19 Mar 2020 07:37 )  x     / 0.01 ng/mL / x     / x     / x        RADIOLOGY & ADDITIONAL TESTS:  < from: CT Cervical Spine/ Head No Cont (03.18.20 @ 11:25) >  IMPRESSION:   CT head:   1.  No acute intracranial hemorrhage.  2.  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   CT cervical spine: No evidence for acute displaced fracture or malalignment.   < end of copied text >      Care Discussed with Consultants/Other Providers:   ID

## 2020-03-19 NOTE — PATIENT PROFILE ADULT - RELATIONSHIP TO PATIENT
[facility]  [address]  [facility] [address]  Phone:  [phone]  Fax:  [phone]                                    Makayla Lee   MRN: [id number]    Department:  [facility]   Date of Visit:  4/9/2019           After Visit Summary Signature Page    I have received my discharge instructions, and my questions have been answered. I have discussed any challenges I see with this plan with the nurse or doctor.    ..........................................................................................................................................  Patient/Patient Representative Signature      ..........................................................................................................................................  Patient Representative Print Name and Relationship to Patient    ..................................................               ................................................  Date                                   Time    ..........................................................................................................................................  Reviewed by Signature/Title    ...................................................              ..............................................  Date                                               Time          22EPIC Rev 08/18        Daughter

## 2020-03-19 NOTE — CONSULT NOTE ADULT - SUBJECTIVE AND OBJECTIVE BOX
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 He was in ED 3 days prior for flu like symptoms, fever, myalgias, diarrhea and cough for 3 days. He was diagnosed with COVID-19 positive, wife also admitted to hospital with the same. He was discharged home with appropriate instructions. Today he feels better, still has some diarrhea but no fever or SOB. Has some cough with no sputum. Trauma work up was negative.     PAST MEDICAL & SURGICAL HISTORY:  Afib  Respiratory insufficiency  CHF (congestive heart failure)  Mitral valve disease  HTN (hypertension)  H/O inguinal hernia: left  Chronic mitral valve disease: replaced    FAMILY HISTORY:  No pertinent family history in first degree relatives    Allergies  No Known Allergies    Antibiotics:  hydroxychloroquine     REVIEW OF SYSTEMS:  CONSTITUTIONAL:  No Fever or chills  HEENT:  No diplopia or blurred vision.  No sore throat or runny nose.  CARDIOVASCULAR:  No chest pain or SOB.  RESPIRATORY:  + cough, shortness of breath, PND or orthopnea.  GASTROINTESTINAL:  No nausea, vomiting or diarrhea.  GENITOURINARY:  No dysuria, frequency or urgency. No Blood in urine  MUSCULOSKELETAL:  no joint aches, no muscle pain  SKIN:  No change in skin, hair or nails.  NEUROLOGIC:  No paresthesias, fasciculations, seizures or weakness.  PSYCHIATRIC:  No disorder of thought or mood.  ENDOCRINE:  No heat or cold intolerance, polyuria or polydipsia.  HEMATOLOGICAL:  No easy bruising or bleeding.     Physical Exam:  Vital Signs Last 24 Hrs  T(C): 36.7 (19 Mar 2020 12:09), Max: 37.9 (19 Mar 2020 08:17)  T(F): 98.1 (19 Mar 2020 12:09), Max: 100.2 (19 Mar 2020 08:17)  HR: 70 (19 Mar 2020 08:17) (61 - 77)  BP: 148/63 (19 Mar 2020 08:17) (122/49 - 152/80)  BP(mean): 76 (19 Mar 2020 06:00) (69 - 76)  RR: 22 (19 Mar 2020 08:17) (18 - 22)  SpO2: 95% (19 Mar 2020 08:17) (95% - 97%)  GEN: NAD  HEENT: normocephalic and atraumatic. EOMI. PERRL.    NECK: Supple.  No lymphadenopathy   LUNGS: Clear to auscultation.  HEART: Regular rate and rhythm 2/6 systolic murmur.  ABDOMEN: Soft, nontender, and nondistended.  Positive bowel sounds.    : No CVA tenderness  EXTREMITIES: Without any cyanosis, clubbing, rash, lesions or edema.  NEUROLOGIC: grossly intact.  PSYCHIATRIC: Appropriate affect .  SKIN: No rash     Labs:  03-19    132<L>  |  97<L>  |  23.0<H>  ----------------------------<  78  4.8   |  21.0<L>  |  1.19    Ca    8.2<L>      19 Mar 2020 07:37    TPro  7.2  /  Alb  3.3  /  TBili  0.7  /  DBili  x   /  AST  71<H>  /  ALT  31  /  AlkPhos  65  03-19                        15.1   5.20  )-----------( 129      ( 19 Mar 2020 07:37 )             47.0     PT/INR - ( 18 Mar 2020 13:09 )   PT: 28.5 sec;   INR: 2.45 ratio    PTT - ( 18 Mar 2020 13:09 )  PTT:47.2 sec    LIVER FUNCTIONS - ( 19 Mar 2020 07:37 )  Alb: 3.3 g/dL / Pro: 7.2 g/dL / ALK PHOS: 65 U/L / ALT: 31 U/L / AST: 71 U/L / GGT: x           CARDIAC MARKERS ( 19 Mar 2020 07:37 )  x     / 0.01 ng/mL / x     / x     / x          RECENT CULTURES:  COVID +     All imaging and other data have been reviewed.  < from: CT Angio Abd Aorta w/run-off w/ IV Cont (01.25.20 @ 11:49) >  EXAM:  CT ANGIO ABD AOR W RUN(W)AW IC    PROCEDURE DATE:  01/25/2020    INTERPRETATION:  CLINICAL INFORMATION: Follow-up abdominal aortic aneurysm.  COMPARISON: CT arteriogram of the abdomen and pelvis dated 6/18/2019.  CT ANGIOGRAM ABDOMEN, PELVIS, AND LOWER EXTREMITIES:  PROCEDURE:   Initially, nonenhanced CT was obtained through the calves. Then, following the rapid administration of intravenous contrast, CT angiography was performed through the abdomen, pelvis, and lower extremities down to the toes.  Delayed images through the calves were also obtained. Sagittal and coronal reformats as well as 3D reconstructions were performed.  125 ml of Omnipaque 350 was administered intravenously without complication and 25 ml were discarded.  FINDINGS:  CENTRAL ARTERIAL SYSTEM:   There is a 4.7 cm infrarenal abdominal aortic aneurysm, unchanged in size from prior exam. The aneurysm remains thrombosed. Thrombosis of the remainder of the aorta, both common iliac arteriesand both external iliac arteries persists. Note is made of a replaced right hepatic artery. There is mild narrowing of the left renal artery. 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 The peripheral distribution of the BIA is reconstituted via collaterals from the arc of Riolin.  RIGHT LOWER EXTREMITY:  The common femoral artery is reconstituted via collaterals from the epigastric arteries. The femoral and popliteal arteries are widely patent. Mild atheromatous plaque is present in the tibioperoneal trunk. There is normal three-vessel runoff in the calf.  LEFT LOWER EXTREMITY:  The common femoral artery is reconstituted via collaterals from the epigastric arteries. The femoral arteries are widely patent. There is mild atheromatous plaque in the peroneal artery. There is normal three-vessel runoff in the calf.  ADDITIONAL FINDINGS:   Mitral valve replacement.  Transvenous pacemaker.  Fatty replacement of the pancreatic head.  Irregular cortical thinning of both kidneys, likely scarring.  Sigmoid diverticulosis.  Grade 2 anterolisthesis of L5 and S1 with bilateral L5 spondylolysis.  IMPRESSION:  Stable 4.7 cm infrarenal abdominal aortic aneurysm.  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 He was in ED 3 days prior for flu like symptoms, fever, myalgias, diarrhea and cough for 3 days. He was diagnosed with COVID-19 positive, wife also admitted to hospital with the same.    COVID19  Multiple comorbidities     - Low grade fever 100.2  - No leukocytosis  - Hemodynamically stable and good O2 sat.   - Will start Hydroxychloroquine 400mg q12h x 1day and 200mg q12 x 4days.   - Will watch for hypoxemia.     Will follow.

## 2020-03-19 NOTE — PROGRESS NOTE ADULT - ASSESSMENT
79yr old male with PMH of HTN, Afib, Mechanical Mitral valve, CHF, CVA presented to ER after he slipped out of bed, felt weak. He was seen in ER 3 days prior for for flu-like- subjective  He was diagnosed COVID-19 positive, wife also admitted to hospital with the same. He was discharged home with appropriate instructions. In ER, he is HD stable, was found to have OMAYRA and Hyponatremia. He is now being admitted for further management.      Called [facility] pharmacy to verify medications. patient home meds include lisinopril 20, warfarin 5, levothyroxine 50, metoprolol tartrate 25 bid, and digoxin .25    # COVID positive   - isolation precautions  - supportive treatment  - tylenol prn for fever  - hydroxychloroquine and vitamin c   - ID consult pending     # Hyponatremia  - improving  - monitor   - 2/2 hypovolemia    #HTN  - monitor blood pressure  - lisinopril and metoprolol    #afib   - rate controlled  - dig and metoprolol  - coumadin     #hypothyroidism   - levothyroxine    # OMAYRA on CKD 3  - omayra resolved     # fall 2/2 wekaness 2/2 hypovolemia and covid 19  - PT eval      # chronic diastolic CHF  - appears euvolemic  - echo in 2015 showing ef 65%  - follow up with cardio outpatient  '  - metoprolol, lisinopril    # mechanical MVR  - coumadin   - monitor inr     # DVT prophylaxis  - coumadin

## 2020-03-19 NOTE — CHART NOTE - NSCHARTNOTEFT_GEN_A_CORE
Vascular surgery called about an asymptomatic infra-renal aortic aneurysm noted on CT scan in January, which is stable at 4.7 from 6 months prior. Patient will need outpatient evaluation for elective repair. CT scan was performed in January, patient has no acute issues, Dr. Coleman was notified and consult was cancelled

## 2020-03-19 NOTE — CHART NOTE - NSCHARTNOTEFT_GEN_A_CORE
stat inr reviewed    PT/INR - ( 19 Mar 2020 16:49 )   PT: 47.4 sec;   INR: 4.02 ratio         PTT - ( 19 Mar 2020 16:49 )  PTT:60.7 sec    hold coumadin for now until therapuetic

## 2020-03-19 NOTE — CHART NOTE - NSCHARTNOTEFT_GEN_A_CORE
called by rn for patient     patient with 2 runs of 8 pvcs hx of afib and mvr will consult cardiology and order echo

## 2020-03-20 LAB
ANION GAP SERPL CALC-SCNC: 12 MMOL/L — SIGNIFICANT CHANGE UP (ref 5–17)
APTT BLD: 48.2 SEC — HIGH (ref 27.5–36.3)
BUN SERPL-MCNC: 15 MG/DL — SIGNIFICANT CHANGE UP (ref 8–20)
CALCIUM SERPL-MCNC: 7.7 MG/DL — LOW (ref 8.6–10.2)
CHLORIDE SERPL-SCNC: 100 MMOL/L — SIGNIFICANT CHANGE UP (ref 98–107)
CO2 SERPL-SCNC: 21 MMOL/L — LOW (ref 22–29)
CREAT SERPL-MCNC: 0.99 MG/DL — SIGNIFICANT CHANGE UP (ref 0.5–1.3)
GLUCOSE SERPL-MCNC: 83 MG/DL — SIGNIFICANT CHANGE UP (ref 70–99)
HCT VFR BLD CALC: 46.1 % — SIGNIFICANT CHANGE UP (ref 39–50)
HGB BLD-MCNC: 14.9 G/DL — SIGNIFICANT CHANGE UP (ref 13–17)
INR BLD: 3.7 RATIO — HIGH (ref 0.88–1.16)
MCHC RBC-ENTMCNC: 27.4 PG — SIGNIFICANT CHANGE UP (ref 27–34)
MCHC RBC-ENTMCNC: 32.3 GM/DL — SIGNIFICANT CHANGE UP (ref 32–36)
MCV RBC AUTO: 84.7 FL — SIGNIFICANT CHANGE UP (ref 80–100)
PLATELET # BLD AUTO: 151 K/UL — SIGNIFICANT CHANGE UP (ref 150–400)
POTASSIUM SERPL-MCNC: 4.1 MMOL/L — SIGNIFICANT CHANGE UP (ref 3.5–5.3)
POTASSIUM SERPL-SCNC: 4.1 MMOL/L — SIGNIFICANT CHANGE UP (ref 3.5–5.3)
PROTHROM AB SERPL-ACNC: 43.5 SEC — HIGH (ref 10–12.9)
RBC # BLD: 5.44 M/UL — SIGNIFICANT CHANGE UP (ref 4.2–5.8)
RBC # FLD: 15.8 % — HIGH (ref 10.3–14.5)
SODIUM SERPL-SCNC: 133 MMOL/L — LOW (ref 135–145)
WBC # BLD: 5.07 K/UL — SIGNIFICANT CHANGE UP (ref 3.8–10.5)
WBC # FLD AUTO: 5.07 K/UL — SIGNIFICANT CHANGE UP (ref 3.8–10.5)

## 2020-03-20 PROCEDURE — 99232 SBSQ HOSP IP/OBS MODERATE 35: CPT

## 2020-03-20 PROCEDURE — 99233 SBSQ HOSP IP/OBS HIGH 50: CPT

## 2020-03-20 RX ADMIN — Medication 25 MILLIGRAM(S): at 05:15

## 2020-03-20 RX ADMIN — Medication 0.25 MILLIGRAM(S): at 05:15

## 2020-03-20 RX ADMIN — Medication 153 MILLIGRAM(S): at 17:34

## 2020-03-20 RX ADMIN — Medication 153 MILLIGRAM(S): at 10:40

## 2020-03-20 RX ADMIN — Medication 650 MILLIGRAM(S): at 09:32

## 2020-03-20 RX ADMIN — Medication 153 MILLIGRAM(S): at 05:15

## 2020-03-20 RX ADMIN — Medication 153 MILLIGRAM(S): at 22:51

## 2020-03-20 RX ADMIN — Medication 200 MILLIGRAM(S): at 05:15

## 2020-03-20 RX ADMIN — Medication 200 MILLIGRAM(S): at 17:33

## 2020-03-20 RX ADMIN — Medication 50 MICROGRAM(S): at 05:15

## 2020-03-20 RX ADMIN — Medication 25 MILLIGRAM(S): at 17:33

## 2020-03-20 RX ADMIN — Medication 650 MILLIGRAM(S): at 20:29

## 2020-03-20 RX ADMIN — LISINOPRIL 20 MILLIGRAM(S): 2.5 TABLET ORAL at 05:15

## 2020-03-20 RX ADMIN — Medication 650 MILLIGRAM(S): at 08:32

## 2020-03-20 RX ADMIN — Medication 650 MILLIGRAM(S): at 21:30

## 2020-03-20 RX ADMIN — Medication 400 MILLIGRAM(S): at 05:15

## 2020-03-20 NOTE — PHYSICAL THERAPY INITIAL EVALUATION ADULT - GAIT PATTERN USED, PT EVAL
ambulation limited due to pt soiling himself, CNA and RN aware, unsteadiness c gait, decreased mohsen step length

## 2020-03-20 NOTE — PHYSICAL THERAPY INITIAL EVALUATION ADULT - CRITERIA FOR SKILLED THERAPEUTIC INTERVENTIONS
anticipated discharge recommendation/functional limitations in following categories/impairments found/rehab potential/therapy frequency/predicted duration of therapy intervention

## 2020-03-20 NOTE — PROGRESS NOTE ADULT - SUBJECTIVE AND OBJECTIVE BOX
Patient is a 79y old  Male who presents with a chief complaint of Fall (20 Mar 2020 11:56)    Patient seen and examined at bedside.  [id number] used for Turkish translation    ALLERGIES:  No Known Allergies    MEDICATIONS  (STANDING):  ascorbic acid IVPB 1500 milliGRAM(s) IV Intermittent <User Schedule>  digoxin     Tablet 0.25 milliGRAM(s) Oral daily  hydroxychloroquine 200 milliGRAM(s) Oral every 12 hours  levothyroxine 50 MICROGram(s) Oral daily  lisinopril 20 milliGRAM(s) Oral daily  metoprolol tartrate 25 milliGRAM(s) Oral every 12 hours    MEDICATIONS  (PRN):  acetaminophen   Tablet .. 650 milliGRAM(s) Oral every 6 hours PRN Temp greater or equal to 38C (100.4F), Mild Pain (1 - 3)    Vital Signs Last 24 Hrs  T(F): 99 (20 Mar 2020 09:26), Max: 101.5 (19 Mar 2020 20:52)  HR: 60 (20 Mar 2020 12:00) (60 - 79)  BP: 126/51 (20 Mar 2020 12:00) (126/51 - 149/66)  RR: 20 (20 Mar 2020 12:00) (20 - 22)  SpO2: 95% (20 Mar 2020 12:00) (94% - 97%)  I&O's Summary    19 Mar 2020 07:01  -  20 Mar 2020 07:00  --------------------------------------------------------  IN: 959 mL / OUT: 626 mL / NET: 333 mL    20 Mar 2020 07:01  -  20 Mar 2020 13:29  --------------------------------------------------------  IN: 118 mL / OUT: 2 mL / NET: 116 mL    PHYSICAL EXAM:  General: NAD, Alert; elderly  ENT: MMM, no thrush  Neck: Supple, No JVD  Lungs: Clear to auscultation bilaterally, good air entry, non-labored breathing  Cardio: +S1/S2, No pitting edema  Abdomen: Soft, Nontender, Nondistended; Bowel sounds present  Extremities: No calf tenderness    LABS:                        14.9   5.07  )-----------( 151      ( 20 Mar 2020 08:10 )             46.1     03-20    133  |  100  |  15.0  ----------------------------<  83  4.1   |  21.0  |  0.99    Ca    7.7      20 Mar 2020 08:10    TPro  7.2  /  Alb  3.3  /  TBili  0.7  /  DBili  x   /  AST  71  /  ALT  31  /  AlkPhos  65  03-19        eGFR if Non African American: 73 mL/min/1.73M2 (03-20-20 @ 08:10)  eGFR if : 84 mL/min/1.73M2 (03-20-20 @ 08:10)    PT/INR - ( 20 Mar 2020 08:10 )   PT: 43.5 sec;   INR: 3.70 ratio         PTT - ( 20 Mar 2020 08:10 )  PTT:48.2 sec      CARDIAC MARKERS ( 19 Mar 2020 07:37 )  x     / 0.01 ng/mL / x     / x     / x          RADIOLOGY & ADDITIONAL TESTS:  < from: CT Cervical Spine/ Head No Cont (03.18.20 @ 11:25) >  IMPRESSION:   CT head:   1.  No acute intracranial hemorrhage.  2.  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   CT cervical spine: No evidence for acute displaced fracture or malalignment.   < end of copied text >      Care Discussed with Consultants/Other Providers:   ID

## 2020-03-20 NOTE — PROGRESS NOTE ADULT - SUBJECTIVE AND OBJECTIVE BOX
Called to evaluate NSVT.    Tele personally reviewed and no NSVT noted. Artifact and paced beats.  No further CVS workup necessary will followup as outpatient.

## 2020-03-20 NOTE — PROGRESS NOTE ADULT - SUBJECTIVE AND OBJECTIVE BOX
[facility]  INFECTIOUS DISEASES AND INTERNAL MEDICINE at [place]  =======================================================  Richi Brizuela MD  Diplomates American Board of Internal Medicine and Infectious Diseases  =======================================================    [facility]-[id number]  ZANE GUSMANNAVDEEPBOONE      # [id number]    Follow up; COVID19    Doing well, minimal cough, no SOB today. Had fever last night.     PAST MEDICAL & SURGICAL HISTORY:  Afib  Respiratory insufficiency  CHF (congestive heart failure)  Mitral valve disease  HTN (hypertension)  H/O inguinal hernia: left  Chronic mitral valve disease: replaced    FAMILY HISTORY:  No pertinent family history in first degree relatives    Allergies  No Known Allergies    Antibiotics:  hydroxychloroquine     REVIEW OF SYSTEMS:  CONSTITUTIONAL:  No Fever or chills  HEENT:  No diplopia or blurred vision.  No sore throat or runny nose.  CARDIOVASCULAR:  No chest pain or SOB.  RESPIRATORY:  no cough, shortness of breath, PND or orthopnea.  GASTROINTESTINAL:  No nausea, vomiting or diarrhea.  GENITOURINARY:  No dysuria, frequency or urgency. No Blood in urine  MUSCULOSKELETAL:  no joint aches, no muscle pain  SKIN:  No change in skin, hair or nails.  NEUROLOGIC:  No paresthesias, fasciculations, seizures or weakness.  PSYCHIATRIC:  No disorder of thought or mood.  ENDOCRINE:  No heat or cold intolerance, polyuria or polydipsia.  HEMATOLOGICAL:  No easy bruising or bleeding.     Physical Exam:  Vital Signs Last 24 Hrs  T(C): 37.2 (20 Mar 2020 09:26), Max: 38.6 (19 Mar 2020 20:52)  T(F): 99 (20 Mar 2020 09:26), Max: 101.5 (19 Mar 2020 20:52)  HR: 77 (20 Mar 2020 05:12) (77 - 79)  BP: 147/67 (20 Mar 2020 05:12) (147/67 - 149/66)  BP(mean): --  RR: 21 (20 Mar 2020 05:12) (21 - 22)  SpO2: 94% (20 Mar 2020 05:12) (94% - 97%)  GEN: NAD  HEENT: normocephalic and atraumatic. EOMI. PERRL.    NECK: Supple.  No lymphadenopathy   LUNGS: Clear to auscultation.  HEART: Regular rate and rhythm 2/6 systolic murmur.  ABDOMEN: Soft, nontender, and nondistended.  Positive bowel sounds.    : No CVA tenderness  EXTREMITIES: Without any cyanosis, clubbing, rash, lesions or edema.  NEUROLOGIC: grossly intact.  PSYCHIATRIC: Appropriate affect .  SKIN: No rash     Labs:  03-20    133<L>  |  100  |  15.0  ----------------------------<  83  4.1   |  21.0<L>  |  0.99    Ca    7.7<L>      20 Mar 2020 08:10    TPro  7.2  /  Alb  3.3  /  TBili  0.7  /  DBili  x   /  AST  71<H>  /  ALT  31  /  AlkPhos  65  03-19                        14.9   5.07  )-----------( 151      ( 20 Mar 2020 08:10 )             46.1     PT/INR - ( 20 Mar 2020 08:10 )   PT: 43.5 sec;   INR: 3.70 ratio    PTT - ( 20 Mar 2020 08:10 )  PTT:48.2 sec    LIVER FUNCTIONS - ( 19 Mar 2020 07:37 )  Alb: 3.3 g/dL / Pro: 7.2 g/dL / ALK PHOS: 65 U/L / ALT: 31 U/L / AST: 71 U/L / GGT: x           CARDIAC MARKERS ( 19 Mar 2020 07:37 )  x     / 0.01 ng/mL / x     / x     / x        RECENT CULTURES:  COVID +     All imaging and other data have been reviewed.  < from: CT Angio Abd Aorta w/run-off w/ IV Cont (01.25.20 @ 11:49) >  EXAM:  CT ANGIO ABD AOR W RUN(W)AW IC    PROCEDURE DATE:  01/25/2020    INTERPRETATION:  CLINICAL INFORMATION: Follow-up abdominal aortic aneurysm.  COMPARISON: CT arteriogram of the abdomen and pelvis dated 6/18/2019.  CT ANGIOGRAM ABDOMEN, PELVIS, AND LOWER EXTREMITIES:  PROCEDURE:   Initially, nonenhanced CT was obtained through the calves. Then, following the rapid administration of intravenous contrast, CT angiography was performed through the abdomen, pelvis, and lower extremities down to the toes.  Delayed images through the calves were also obtained. Sagittal and coronal reformats as well as 3D reconstructions were performed.  125 ml of Omnipaque 350 was administered intravenously without complication and 25 ml were discarded.  FINDINGS:  CENTRAL ARTERIAL SYSTEM:   There is a 4.7 cm infrarenal abdominal aortic aneurysm, unchanged in size from prior exam. The aneurysm remains thrombosed. Thrombosis of the remainder of the aorta, both common iliac arteriesand both external iliac arteries persists. Note is made of a replaced right hepatic artery. There is mild narrowing of the left renal artery. 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 The peripheral distribution of the BIA is reconstituted via collaterals from the arc of Riolin.  RIGHT LOWER EXTREMITY:  The common femoral artery is reconstituted via collaterals from the epigastric arteries. The femoral and popliteal arteries are widely patent. Mild atheromatous plaque is present in the tibioperoneal trunk. There is normal three-vessel runoff in the calf.  LEFT LOWER EXTREMITY:  The common femoral artery is reconstituted via collaterals from the epigastric arteries. The femoral arteries are widely patent. There is mild atheromatous plaque in the peroneal artery. There is normal three-vessel runoff in the calf.  ADDITIONAL FINDINGS:   Mitral valve replacement.  Transvenous pacemaker.  Fatty replacement of the pancreatic head.  Irregular cortical thinning of both kidneys, likely scarring.  Sigmoid diverticulosis.  Grade 2 anterolisthesis of L5 and S1 with bilateral L5 spondylolysis.  IMPRESSION:  Stable 4.7 cm infrarenal abdominal aortic aneurysm.  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      Assessment and Plan:   78y/o man with PMH of HTN, Afib, Mechanical Mitral valve on warfarin, CHF and CVA was admitted on 3/18 after a fall. He was in ED 3 days prior for flu like symptoms, fever, myalgias, diarrhea and cough for 3 days. He was diagnosed with COVID-19 positive, wife also admitted to hospital with the same.    COVID19  Multiple comorbidities     - Had fever last night.   - No leukocytosis  - Hemodynamically stable and good O2 sat.   - Complete 5days of Hydroxychloroquine     Will sign off please call with any question.

## 2020-03-20 NOTE — PROGRESS NOTE ADULT - ASSESSMENT
79yr old male with PMH of HTN, Afib, Mechanical Mitral valve, CHF, CVA presented to ER after he slipped out of bed, felt weak. He was seen in ER 3 days prior for for flu-like- subjective  He was diagnosed COVID-19 positive, wife also admitted to hospital with the same. He was discharged home with appropriate instructions. In ER, he is HD stable, was found to have OMAYRA and Hyponatremia. He is now being admitted for further management.      Called [facility] pharmacy to verify medications. patient home meds include lisinopril 20, warfarin 5, levothyroxine 50, metoprolol tartrate 25 bid, and digoxin .25    # COVID positive   - isolation precautions  - supportive treatment  - tylenol prn for fever  - hydroxychloroquine and vitamin c   - ID consult appreciated     # Hyponatremia  - improving  - monitor   - 2/2 hypovolemia    #HTN  - monitor blood pressure  - lisinopril and metoprolol    #afib   - rate controlled  - dig and metoprolol  - coumadin - hold    #hypothyroidism   - levothyroxine    # OMAYRA on CKD 3  - omayra resolved     # fall 2/2 wekaness 2/2 hypovolemia and covid 19  - PT eval     # chronic diastolic CHF  - appears euvolemic  - echo in 2015 showing ef 65%  - follow up with cardio outpatient    - metoprolol, lisinopril    # mechanical MVR  - coumadin - hold   - monitor inr - currently supratherapuetic INR     #supratherapuetic INR   - monitor INR   - hold coumadin    # DVT prophylaxis  - coumadin held as supratherapuetic INR     Dispo: pending rodolfo will need 7 days in hospital prior to d/c given positive covid from date of diagnosis ; inr supratherapuetic will need to monitor

## 2020-03-21 LAB
ANION GAP SERPL CALC-SCNC: 16 MMOL/L — SIGNIFICANT CHANGE UP (ref 5–17)
APTT BLD: 59.9 SEC — HIGH (ref 27.5–36.3)
BUN SERPL-MCNC: 12 MG/DL — SIGNIFICANT CHANGE UP (ref 8–20)
CALCIUM SERPL-MCNC: 8.2 MG/DL — LOW (ref 8.6–10.2)
CHLORIDE SERPL-SCNC: 100 MMOL/L — SIGNIFICANT CHANGE UP (ref 98–107)
CO2 SERPL-SCNC: 19 MMOL/L — LOW (ref 22–29)
CREAT SERPL-MCNC: 0.91 MG/DL — SIGNIFICANT CHANGE UP (ref 0.5–1.3)
GLUCOSE SERPL-MCNC: 90 MG/DL — SIGNIFICANT CHANGE UP (ref 70–99)
HCT VFR BLD CALC: 46.8 % — SIGNIFICANT CHANGE UP (ref 39–50)
HGB BLD-MCNC: 15.3 G/DL — SIGNIFICANT CHANGE UP (ref 13–17)
INR BLD: 4.08 RATIO — HIGH (ref 0.88–1.16)
MCHC RBC-ENTMCNC: 27.8 PG — SIGNIFICANT CHANGE UP (ref 27–34)
MCHC RBC-ENTMCNC: 32.7 GM/DL — SIGNIFICANT CHANGE UP (ref 32–36)
MCV RBC AUTO: 84.9 FL — SIGNIFICANT CHANGE UP (ref 80–100)
PLATELET # BLD AUTO: 146 K/UL — LOW (ref 150–400)
POTASSIUM SERPL-MCNC: 4.3 MMOL/L — SIGNIFICANT CHANGE UP (ref 3.5–5.3)
POTASSIUM SERPL-SCNC: 4.3 MMOL/L — SIGNIFICANT CHANGE UP (ref 3.5–5.3)
PROTHROM AB SERPL-ACNC: 48.2 SEC — HIGH (ref 10–12.9)
RBC # BLD: 5.51 M/UL — SIGNIFICANT CHANGE UP (ref 4.2–5.8)
RBC # FLD: 15.7 % — HIGH (ref 10.3–14.5)
SODIUM SERPL-SCNC: 135 MMOL/L — SIGNIFICANT CHANGE UP (ref 135–145)
WBC # BLD: 5.96 K/UL — SIGNIFICANT CHANGE UP (ref 3.8–10.5)
WBC # FLD AUTO: 5.96 K/UL — SIGNIFICANT CHANGE UP (ref 3.8–10.5)

## 2020-03-21 PROCEDURE — 99232 SBSQ HOSP IP/OBS MODERATE 35: CPT

## 2020-03-21 RX ADMIN — LISINOPRIL 20 MILLIGRAM(S): 2.5 TABLET ORAL at 05:46

## 2020-03-21 RX ADMIN — Medication 153 MILLIGRAM(S): at 23:16

## 2020-03-21 RX ADMIN — Medication 25 MILLIGRAM(S): at 17:25

## 2020-03-21 RX ADMIN — Medication 650 MILLIGRAM(S): at 05:47

## 2020-03-21 RX ADMIN — Medication 50 MICROGRAM(S): at 05:46

## 2020-03-21 RX ADMIN — Medication 650 MILLIGRAM(S): at 21:10

## 2020-03-21 RX ADMIN — Medication 200 MILLIGRAM(S): at 18:35

## 2020-03-21 RX ADMIN — Medication 200 MILLIGRAM(S): at 05:46

## 2020-03-21 RX ADMIN — Medication 153 MILLIGRAM(S): at 17:25

## 2020-03-21 RX ADMIN — Medication 650 MILLIGRAM(S): at 20:10

## 2020-03-21 RX ADMIN — Medication 153 MILLIGRAM(S): at 05:47

## 2020-03-21 RX ADMIN — Medication 153 MILLIGRAM(S): at 12:00

## 2020-03-21 RX ADMIN — Medication 25 MILLIGRAM(S): at 05:46

## 2020-03-21 RX ADMIN — Medication 0.25 MILLIGRAM(S): at 05:46

## 2020-03-21 NOTE — PROGRESS NOTE ADULT - SUBJECTIVE AND OBJECTIVE BOX
[facility]  INFECTIOUS DISEASES AND INTERNAL MEDICINE at [place]  =======================================================  Richi Brizuela MD  Diplomates American Board of Internal Medicine and Infectious Diseases  =======================================================    [id number]  [facility]     Follow up; COVID19    Doing well, minimal cough, no SOB with acceptable SO2.   Had a low grade fever.   Remains inpatient due to placement issue.     PAST MEDICAL & SURGICAL HISTORY:  Afib  Respiratory insufficiency  CHF (congestive heart failure)  Mitral valve disease  HTN (hypertension)  H/O inguinal hernia: left  Chronic mitral valve disease: replaced    FAMILY HISTORY:  No pertinent family history in first degree relatives    Allergies  No Known Allergies    Antibiotics:  hydroxychloroquine     REVIEW OF SYSTEMS:  CONSTITUTIONAL:  No Fever or chills  HEENT:  No diplopia or blurred vision.  No sore throat or runny nose.  CARDIOVASCULAR:  No chest pain or SOB.  RESPIRATORY:  no cough, shortness of breath, PND or orthopnea.  GASTROINTESTINAL:  No nausea, vomiting or diarrhea.  GENITOURINARY:  No dysuria, frequency or urgency. No Blood in urine  MUSCULOSKELETAL:  no joint aches, no muscle pain  SKIN:  No change in skin, hair or nails.  NEUROLOGIC:  No paresthesias, fasciculations, seizures or weakness.  PSYCHIATRIC:  No disorder of thought or mood.  ENDOCRINE:  No heat or cold intolerance, polyuria or polydipsia.  HEMATOLOGICAL:  No easy bruising or bleeding.     Physical Exam:  Vital Signs Last 24 Hrs  T(C): 37.7 (21 Mar 2020 05:30), Max: 38 (20 Mar 2020 20:18)  T(F): 99.9 (21 Mar 2020 05:30), Max: 100.4 (20 Mar 2020 20:18)  HR: 85 (21 Mar 2020 05:30) (60 - 85)  BP: 155/61 (21 Mar 2020 05:30) (106/80 - 155/61)  BP(mean): --  RR: 19 (21 Mar 2020 04:00) (19 - 22)  SpO2: 95% (21 Mar 2020 04:00) (94% - 96%)  GEN: NAD  HEENT: normocephalic and atraumatic. EOMI. PERRL.    NECK: Supple.  No lymphadenopathy   LUNGS: Clear to auscultation.  HEART: Regular rate and rhythm 2/6 systolic murmur.  ABDOMEN: Soft, nontender, and nondistended.  Positive bowel sounds.    : No CVA tenderness  EXTREMITIES: Without any cyanosis, clubbing, rash, lesions or edema.  NEUROLOGIC: grossly intact.  PSYCHIATRIC: Appropriate affect .  SKIN: No rash     Labs:  03-21    135  |  100  |  12.0  ----------------------------<  90  4.3   |  19.0<L>  |  0.91    Ca    8.2<L>      21 Mar 2020 06:17                        15.3   5.96  )-----------( 146      ( 21 Mar 2020 06:17 )             46.8     PT/INR - ( 21 Mar 2020 06:17 )   PT: 48.2 sec;   INR: 4.08 ratio    PTT - ( 21 Mar 2020 06:17 )  PTT:59.9 sec    RECENT CULTURES:  COVID +     All imaging and other data have been reviewed.  < from: CT Angio Abd Aorta w/run-off w/ IV Cont (01.25.20 @ 11:49) >  EXAM:  CT ANGIO ABD AOR W RUN(W)AW IC    PROCEDURE DATE:  01/25/2020    INTERPRETATION:  CLINICAL INFORMATION: Follow-up abdominal aortic aneurysm.  COMPARISON: CT arteriogram of the abdomen and pelvis dated 6/18/2019.  CT ANGIOGRAM ABDOMEN, PELVIS, AND LOWER EXTREMITIES:  PROCEDURE:   Initially, nonenhanced CT was obtained through the calves. Then, following the rapid administration of intravenous contrast, CT angiography was performed through the abdomen, pelvis, and lower extremities down to the toes.  Delayed images through the calves were also obtained. Sagittal and coronal reformats as well as 3D reconstructions were performed.  125 ml of Omnipaque 350 was administered intravenously without complication and 25 ml were discarded.  FINDINGS:  CENTRAL ARTERIAL SYSTEM:   There is a 4.7 cm infrarenal abdominal aortic aneurysm, unchanged in size from prior exam. The aneurysm remains thrombosed. Thrombosis of the remainder of the aorta, both common iliac arteriesand both external iliac arteries persists. Note is made of a replaced right hepatic artery. There is mild narrowing of the left renal artery. 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 The peripheral distribution of the BIA is reconstituted via collaterals from the arc of R[place]in.  RIGHT LOWER EXTREMITY:  The common femoral artery is reconstituted via collaterals from the epigastric arteries. The femoral and popliteal arteries are widely patent. Mild atheromatous plaque is present in the tibioperoneal trunk. There is normal three-vessel runoff in the calf.  LEFT LOWER EXTREMITY:  The common femoral artery is reconstituted via collaterals from the epigastric arteries. The femoral arteries are widely patent. There is mild atheromatous plaque in the peroneal artery. There is normal three-vessel runoff in the calf.  ADDITIONAL FINDINGS:   Mitral valve replacement.  Transvenous pacemaker.  Fatty replacement of the pancreatic head.  Irregular cortical thinning of both kidneys, likely scarring.  Sigmoid diverticulosis.  Grade 2 anterolisthesis of L5 and S1 with bilateral L5 spondylolysis.  IMPRESSION:  Stable 4.7 cm infrarenal abdominal aortic aneurysm.  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 He was in ED 3 days prior for flu like symptoms, fever, myalgias, diarrhea and cough for 3 days. He was diagnosed with COVID-19 positive, wife also admitted to hospital with the same.    COVID19  Multiple comorbidities     - Had a low grade fever last night.   - No leukocytosis  - Hemodynamically stable and good O2 sat.   - Complete 5days of Hydroxychloroquine     Will follow while in the hospital.

## 2020-03-21 NOTE — PROGRESS NOTE ADULT - SUBJECTIVE AND OBJECTIVE BOX
Patient is a 79y old  Male who presents with a chief complaint of Fall (21 Mar 2020 16:44) improving fevers and sob       HEALTH ISSUES - PROBLEM Dx:  covid 19       INTERVAL HPI/OVERNIGHT EVENTS:  Patient seen and examined at bedside. No acute events overnight. Patient states he is feeling much better, sob is improving, minimal dry cough. Aside from this has no other complaints. Patient denies chest pain,, abd pain, N/V, fever, chills, dysuria or any other complaints. All remainder ROS negative.     Vital Signs Last 24 Hrs  T(C): 37.1 (21 Mar 2020 23:15), Max: 38.2 (21 Mar 2020 20:00)  T(F): 98.7 (21 Mar 2020 23:15), Max: 100.8 (21 Mar 2020 20:00)  HR: 68 (22 Mar 2020 00:00) (65 - 85)  BP: 148/73 (22 Mar 2020 00:00) (128/54 - 163/92)  BP(mean): 96 (22 Mar 2020 00:00) (74 - 118)  RR: 25 (22 Mar 2020 00:00) (18 - 28)  SpO2: 95% (22 Mar 2020 00:00) (93% - 96%)    I&O's Summary    20 Mar 2020 07:01  -  21 Mar 2020 07:00  --------------------------------------------------------  IN: 1168 mL / OUT: 728 mL / NET: 440 mL    21 Mar 2020 07:01  -  22 Mar 2020 01:09  --------------------------------------------------------  IN: 0 mL / OUT: 204 mL / NET: -204 mL        CONSTITUTIONAL: Well appearing, well nourished, awake, alert and in no apparent distress  CARDIAC: Normal rate, regular rhythm.  Heart sounds S1, S2.  No murmurs, rubs or gallops   RESPIRATORY: Fair air entry   b/l rhonchi   GASTROENTEROLOGY : Soft nt nd bs+ normoactive   EXTREMITIES: No edema, cyanosis or deformity   NEUROLOGICAL: Alert and oriented, no focal deficits, no motor or sensory deficits.  SKIN: No rash, skin turgor wnl    MEDICATIONS  (STANDING):  ascorbic acid IVPB 1500 milliGRAM(s) IV Intermittent <User Schedule>  digoxin     Tablet 0.25 milliGRAM(s) Oral daily  hydroxychloroquine 200 milliGRAM(s) Oral every 12 hours  levothyroxine 50 MICROGram(s) Oral daily  lisinopril 20 milliGRAM(s) Oral daily  metoprolol tartrate 25 milliGRAM(s) Oral every 12 hours    MEDICATIONS  (PRN):  acetaminophen   Tablet .. 650 milliGRAM(s) Oral every 6 hours PRN Temp greater or equal to 38C (100.4F), Mild Pain (1 - 3)      LABS:                        15.3   5.96  )-----------( 146      ( 21 Mar 2020 06:17 )             46.8     03-21    135  |  100  |  12.0  ----------------------------<  90  4.3   |  19.0<L>  |  0.91    Ca    8.2<L>      21 Mar 2020 06:17      PT/INR - ( 21 Mar 2020 06:17 )   PT: 48.2 sec;   INR: 4.08 ratio         PTT - ( 21 Mar 2020 06:17 )  PTT:59.9 sec        RADIOLOGY & ADDITIONAL TESTS:  no new imaging studies

## 2020-03-21 NOTE — PROGRESS NOTE ADULT - ASSESSMENT
79 year old male with hx of HTN, Afib on coumadin, Mechanical Mitral valve, chronic diastolic HF, CVA presented to ER generalized weakness, flu like sx x 3 days and s/p fall out of bed. Noted to be covid positive. Pt admitted with COVID 19. Empirically remains on hydroxychloroquin and vit c, improving sob.     # COVID positive   - Improving sob, dry cough remains   - c/w airborne and contact isolation precautions  - c/w supportive treatment  -c/w  tylenol prn for fever  - c/w hydroxychloroquine and vitamin c   - ID f/u noted and appreciated    # S/p fall 2/2 generalized wekaness 2/2 hypovolemia and covid 19  - no injuries noted on exam   - imaging studies negative   - PT evaluation noted     # OMAYRA on CKD 3  - resolved   - Creatine back to baseline   - avoid nephrotoxic medications  - lisinopril held     # Hyponatremia  2/2 hypovolemia   - sodium improving and stable      #Afib   -hx of mechanical valve -MVR   - rate controlled  - c/w dig and metoprolol  - coumadin on hold given supratherapeutic INR  - today 4  - goal is 2.5-3.5  will redose when within goal range  - f/u INR in the am  -cardio f/u noted and appreciated      # Chronic diastolic CHF  - appears euvolemic  - echo in 2015 showing ef 65%  - c/w metoprolol po bid  - holding lisinopril in the setting of covid  - cardio f/u noted and appreciated     #HTN  = bp stable   - monitor blood pressure  -c/w metoprolol  - holding lisinopril in the setting of covid       #Hypothyroidism   - levothyroxine     # DVT prophylaxis  - coumadin held as supratherapuetic INR   - scd boots b/l     Dispo: PT consulted and recommended RODOLFO, pending rodolfo placement; will need 7 days in hospital prior to d/c given positive covid from date of diagnosis ; inr supratherapuetic will need to monitor in the interim. Plan for discharge on 3/24/20. 79 year old male with hx of HTN, Afib on coumadin, Mechanical Mitral valve, chronic diastolic HF, CVA presented to ER generalized weakness, flu like sx x 3 days and s/p fall out of bed. Noted to be covid positive. Pt admitted with COVID 19. Empirically remains on hydroxychloroquin and vit c, improving sob.     # COVID positive   - Improving sob, dry cough remains   - c/w airborne and contact isolation precautions  - c/w supportive treatment  -c/w  tylenol prn for fever  - c/w robitussin prn   - c/w tessalone pearls prn   - c/w hydroxychloroquine and vitamin c to end on 3/23  - no longer on supplemental o2   - ID f/u noted and appreciated    # S/p fall 2/2 generalized wekaness 2/2 hypovolemia and covid 19  - no injuries noted on exam   - imaging studies negative   - PT evaluation noted     # OMAYRA on CKD 3  - resolved   - Creatine back to baseline   - avoid nephrotoxic medications  - lisinopril held     # Hyponatremia  2/2 hypovolemia   - sodium improving and stable      #Afib   -hx of mechanical valve -MVR   - rate controlled  - c/w dig and metoprolol  - coumadin on hold given supratherapeutic INR  - today 4  - goal is 2.5-3.5  will redose when within goal range  - f/u INR in the am  -cardio f/u noted and appreciated      # Chronic diastolic CHF  - appears euvolemic  - echo in 2015 showing ef 65%  - c/w metoprolol po bid  - holding lisinopril in the setting of covid  - cardio f/u noted and appreciated     #HTN  = bp stable   - monitor blood pressure  -c/w metoprolol  - holding lisinopril in the setting of covid       #Hypothyroidism   - levothyroxine     # DVT prophylaxis  - coumadin held as supratherapuetic INR   - scd boots b/l     Dispo: PT consulted and recommended RODOLFO, pending rodolfo placement; will need 7 days in hospital prior to d/c given positive covid from date of diagnosis ; inr supratherapuetic will need to monitor in the interim. Plan for discharge on 3/24/20. Of not his wife is also in the hospital for covid 19. 79 year old male with hx of HTN, Afib on coumadin, Mechanical Mitral valve, chronic diastolic HF, CVA presented to ER generalized weakness, flu like sx x 3 days and s/p fall out of bed. Noted to be covid positive. Pt admitted with COVID 19. Empirically remains on hydroxychloroquin and vit c, improving sob.     # COVID positive   - Improving sob, dry cough remains   - c/w airborne and contact isolation precautions  - c/w supportive treatment  -c/w  tylenol prn for fever  - c/w robitussin prn   - c/w tessalone pearls prn   - c/w hydroxychloroquine and vitamin c to end on 3/23  - no longer on supplemental o2   - ID f/u noted and appreciated    # S/p fall 2/2 generalized wekaness 2/2 hypovolemia and covid 19  - no injuries noted on exam   - imaging studies negative   - PT evaluation noted     # OMAYRA on CKD 3  - resolved   - Creatine back to baseline   - avoid nephrotoxic medications  - lisinopril held     # Hyponatremia  2/2 hypovolemia   - sodium improving and stable      #Afib   -hx of mechanical valve -MVR   - rate controlled  - c/w dig and metoprolol  - coumadin on hold given supratherapeutic INR  - today 4  - goal is 2.5-3.5  will redose when within goal range  - f/u INR in the am  -cardio f/u noted and appreciated      # Chronic diastolic CHF  - appears euvolemic  - echo in 2015 showing ef 65%  - c/w metoprolol po bid  - holding lisinopril in the setting of covid  - cardio f/u noted and appreciated     #AAA  -Stable 4.7 cm infrarenal abdominal aortic aneurysm. Persistent occlusion of the abdominal aortic aneurysm, infrarenal aorta, and bilateral iliac arteries with distal reconstitution of the common femoral arteries.  - Pt to f/u outpt with vascular sx       #HTN  = bp stable   - monitor blood pressure  -c/w metoprolol  - holding lisinopril in the setting of covid       #Hypothyroidism   - levothyroxine     # DVT prophylaxis  - coumadin held as supratherapuetic INR   - scd boots b/l     Dispo: PT consulted and recommended RODOFLO, pending rodolfo placement; will need 7 days in hospital prior to d/c given positive covid from date of diagnosis ; inr supratherapuetic will need to monitor in the interim. Plan for discharge on 3/24/20. Of not his wife is also in the hospital for covid 19.

## 2020-03-22 LAB
CRP SERPL-MCNC: 10.37 MG/DL — HIGH (ref 0–0.4)
FERRITIN SERPL-MCNC: 778 NG/ML — HIGH (ref 30–400)
INR BLD: 3.69 RATIO — HIGH (ref 0.88–1.16)
PROTHROM AB SERPL-ACNC: 43.4 SEC — HIGH (ref 10–12.9)

## 2020-03-22 PROCEDURE — 99232 SBSQ HOSP IP/OBS MODERATE 35: CPT

## 2020-03-22 RX ORDER — WARFARIN SODIUM 2.5 MG/1
2 TABLET ORAL ONCE
Refills: 0 | Status: COMPLETED | OUTPATIENT
Start: 2020-03-22 | End: 2020-03-23

## 2020-03-22 RX ADMIN — Medication 200 MILLIGRAM(S): at 19:30

## 2020-03-22 RX ADMIN — Medication 200 MILLIGRAM(S): at 05:36

## 2020-03-22 RX ADMIN — Medication 153 MILLIGRAM(S): at 13:33

## 2020-03-22 RX ADMIN — Medication 25 MILLIGRAM(S): at 05:36

## 2020-03-22 RX ADMIN — Medication 50 MICROGRAM(S): at 05:36

## 2020-03-22 RX ADMIN — Medication 0.25 MILLIGRAM(S): at 05:36

## 2020-03-22 RX ADMIN — Medication 153 MILLIGRAM(S): at 18:28

## 2020-03-22 RX ADMIN — Medication 25 MILLIGRAM(S): at 18:31

## 2020-03-22 RX ADMIN — Medication 153 MILLIGRAM(S): at 05:36

## 2020-03-22 NOTE — PROGRESS NOTE ADULT - ASSESSMENT
79 year old male with hx of HTN, Afib on coumadin, Mechanical Mitral valve, chronic diastolic HF, CVA presented to ER generalized weakness, flu like sx x 3 days and s/p fall out of bed. Pan ct head/ c spine negative. Noted to be covid positive. Pt admitted with COVID 19. Empirically remains on hydroxychloroquin and vit c, improving sob. PT consulted and recommended RODOLFO, pt anticipated for discharge on 3/24.     # COVID positive   - Improving sob, dry cough remains   - c/w airborne and contact isolation precautions  - c/w supportive treatment  -c/w  tylenol prn for fever  - c/w robitussin prn   - c/w tessalone pearls prn   - c/w hydroxychloroquine and vitamin c to end on 3/23  - no longer on supplemental o2   - ID f/u noted and appreciated    # S/p fall 2/2 generalized wekaness 2/2 hypovolemia and covid 19  - no injuries noted on exam   - imaging studies negative   - PT evaluation noted     # OMAYRA on CKD 3  - resolved   - Creatine back to baseline   - avoid nephrotoxic medications  - lisinopril held     # Hyponatremia  2/2 hypovolemia   - sodium improving and stable      #Afib   -hx of mechanical valve -MVR   - rate controlled  - c/w dig and metoprolol  - INR- today 3.6  - goal is 2.5-3.5  will redose coumadin 2 mg po qhs   - f/u INR in the am  -cardio f/u noted and appreciated      # Chronic diastolic CHF  - appears euvolemic  - echo in 2015 showing ef 65%  - c/w metoprolol po bid  - holding lisinopril in the setting of covid  - cardio f/u noted and appreciated     #AAA  -Stable 4.7 cm infrarenal abdominal aortic aneurysm. Persistent occlusion of the abdominal aortic aneurysm, infrarenal aorta, and bilateral iliac arteries with distal reconstitution of the common femoral arteries.  - Pt to f/u outpt with vascular sx       #HTN  = bp stable   - monitor blood pressure  -c/w metoprolol  - holding lisinopril in the setting of covid       #Hypothyroidism   - levothyroxine     # DVT prophylaxis  - covered on coumadin   - scd boots b/l     Dispo: PT consulted and recommended RODOLFO, pending rodolfo placement; will need 7 days in hospital prior to d/c given positive covid from date of diagnosis ; inr supratherapuetic will need to monitor in the interim. Plan for discharge on 3/24/20. Of not his wife is also in the hospital for covid 19. 79 year old male with hx of HTN, Afib on coumadin, Mechanical Mitral valve, chronic diastolic HF, CVA presented to ER generalized weakness, flu like sx x 3 days and s/p fall out of bed. Pan ct head/ c spine negative. Noted to be covid positive. Pt admitted with COVID 19. Empirically remains on hydroxychloroquin and vit c, improving sob. PT consulted and recommended RODOLFO, pt anticipated for discharge on 3/24.     # COVID positive   - Improving sob, dry cough remains   - c/w airborne and contact isolation precautions  - c/w supportive treatment  -c/w  tylenol prn for fever  - c/w robitussin prn   - c/w tessalone pearls prn   - c/w hydroxychloroquine and vitamin c to end on 3/23  - no longer on supplemental o2   - ID f/u noted and appreciated    # S/p fall 2/2 generalized wekaness 2/2 hypovolemia and covid 19  - no injuries noted on exam   - imaging studies negative   - PT evaluation noted     # OMAYRA on CKD 3  - resolved   -prerenal azotemia resolved   - Creatine back to baseline   - avoid nephrotoxic medications  - lisinopril held     # Hyponatremia  2/2 hypovolemia - resolved   - sodium improving and stable    #Afib   -hx of mechanical valve -MVR   - rate controlled  - c/w dig and metoprolol  - INR- today 3.6  - goal is 2.5-3.5  will redose coumadin 2 mg po qhs   - f/u INR in the am  -cardio f/u noted and appreciated      # Chronic diastolic CHF  - appears euvolemic  - echo in 2015 showing ef 65%  - c/w metoprolol po bid  - holding lisinopril in the setting of covid  - cardio f/u noted and appreciated     #AAA  -Stable 4.7 cm infrarenal abdominal aortic aneurysm. Persistent occlusion of the abdominal aortic aneurysm, infrarenal aorta, and bilateral iliac arteries with distal reconstitution of the common femoral arteries.  - Pt to f/u outpt with vascular sx       #HTN  = bp stable   - monitor blood pressure  -c/w metoprolol  - holding lisinopril in the setting of covid       #Hypothyroidism   - levothyroxine     # DVT prophylaxis  - covered on coumadin   - scd boots b/l     Dispo: PT consulted and recommended RODOLFO, pending rodolfo placement; will need 7 days in hospital prior to d/c given positive covid from date of diagnosis ; inr supratherapuetic will need to monitor in the interim. Plan for discharge on 3/24/20. Of not his wife is also in the hospital for covid 19.

## 2020-03-22 NOTE — PROGRESS NOTE ADULT - SUBJECTIVE AND OBJECTIVE BOX
Patient is a 79y old  Male who presents with a chief complaint of Fall (21 Mar 2020 16:44) improving sob, minimal cough       HEALTH ISSUES - PROBLEM Dx:  covid 19       INTERVAL HPI/OVERNIGHT EVENTS:  Patient seen and examined at bedside. No acute events overnight. Patient states he is feeling well much better, sob is improving, minimal dry cough. Understands he is waiting until tuesday for rehab placement. Patient denies chest pain, abd pain, N/V, fever, chills, dysuria or any other complaints. All remainder ROS negative.     Vital Signs Last 24 Hrs  T(C): 36.9 (22 Mar 2020 09:27), Max: 38.2 (21 Mar 2020 20:00)  T(F): 98.5 (22 Mar 2020 09:27), Max: 100.8 (21 Mar 2020 20:00)  HR: 72 (22 Mar 2020 08:00) (68 - 89)  BP: 146/78 (22 Mar 2020 08:00) (128/54 - 148/73)  BP(mean): 82 (22 Mar 2020 05:26) (74 - 118)  RR: 24 (22 Mar 2020 08:00) (18 - 28)  SpO2: 93% (22 Mar 2020 08:00) (93% - 95%)      I&O's Summary    21 Mar 2020 07:01  -  22 Mar 2020 07:00  --------------------------------------------------------  IN: 660 mL / OUT: 505 mL / NET: 155 mL      CONSTITUTIONAL: Well appearing, well nourished, awake, alert and in no apparent distress  CARDIAC: Normal rate, regular rhythm.  Heart sounds S1, S2.  No murmurs, rubs or gallops   RESPIRATORY: Fair air entry   b/l rhonchi   GASTROENTEROLOGY : Soft nt nd bs+ normoactive   EXTREMITIES: No edema, cyanosis or deformity   NEUROLOGICAL: Alert and oriented, no focal deficits, no motor or sensory deficits.  SKIN: No rash, skin turgor wnl    MEDICATIONS  (STANDING):  ascorbic acid IVPB 1500 milliGRAM(s) IV Intermittent <User Schedule>  digoxin     Tablet 0.25 milliGRAM(s) Oral daily  hydroxychloroquine 200 milliGRAM(s) Oral every 12 hours  levothyroxine 50 MICROGram(s) Oral daily  metoprolol tartrate 25 milliGRAM(s) Oral every 12 hours  warfarin 2 milliGRAM(s) Oral once    MEDICATIONS  (PRN):  acetaminophen   Tablet .. 650 milliGRAM(s) Oral every 6 hours PRN Temp greater or equal to 38C (100.4F), Mild Pain (1 - 3)      LABS:                        15.3   5.96  )-----------( 146      ( 21 Mar 2020 06:17 )             46.8     03-21    135  |  100  |  12.0  ----------------------------<  90  4.3   |  19.0<L>  |  0.91    Ca    8.2<L>      21 Mar 2020 06:17      PT/INR - ( 22 Mar 2020 08:13 )   PT: 43.4 sec;   INR: 3.69 ratio         PTT - ( 21 Mar 2020 06:17 )  PTT:59.9 sec        RADIOLOGY & ADDITIONAL TESTS:  No new imaging studies

## 2020-03-23 LAB
INR BLD: 3.65 RATIO — HIGH (ref 0.88–1.16)
PROTHROM AB SERPL-ACNC: 42.9 SEC — HIGH (ref 10–12.9)

## 2020-03-23 PROCEDURE — 99232 SBSQ HOSP IP/OBS MODERATE 35: CPT

## 2020-03-23 RX ORDER — WARFARIN SODIUM 2.5 MG/1
3 TABLET ORAL ONCE
Refills: 0 | Status: COMPLETED | OUTPATIENT
Start: 2020-03-23 | End: 2020-03-23

## 2020-03-23 RX ADMIN — WARFARIN SODIUM 2 MILLIGRAM(S): 2.5 TABLET ORAL at 00:23

## 2020-03-23 RX ADMIN — Medication 200 MILLIGRAM(S): at 16:50

## 2020-03-23 RX ADMIN — Medication 25 MILLIGRAM(S): at 16:50

## 2020-03-23 RX ADMIN — Medication 25 MILLIGRAM(S): at 06:09

## 2020-03-23 RX ADMIN — WARFARIN SODIUM 3 MILLIGRAM(S): 2.5 TABLET ORAL at 21:45

## 2020-03-23 RX ADMIN — Medication 153 MILLIGRAM(S): at 10:25

## 2020-03-23 RX ADMIN — Medication 50 MICROGRAM(S): at 06:08

## 2020-03-23 RX ADMIN — Medication 153 MILLIGRAM(S): at 00:24

## 2020-03-23 RX ADMIN — Medication 200 MILLIGRAM(S): at 06:09

## 2020-03-23 RX ADMIN — Medication 0.25 MILLIGRAM(S): at 06:08

## 2020-03-23 NOTE — PROGRESS NOTE ADULT - SUBJECTIVE AND OBJECTIVE BOX
Patient is a 79y old  Male who presents with a chief complaint of Fall (23 Mar 2020 11:48)       HEALTH ISSUES - PROBLEM Dx:  covid 19     INTERVAL HPI/OVERNIGHT EVENTS:  Patient seen and examined at bedside. No acute events overnight. Patient states     Vital Signs Last 24 Hrs  T(C): 36.4 (23 Mar 2020 14:04), Max: 36.9 (22 Mar 2020 19:45)  T(F): 97.6 (23 Mar 2020 14:04), Max: 98.5 (22 Mar 2020 19:45)  HR: 76 (23 Mar 2020 14:04) (60 - 87)  BP: 136/67 (23 Mar 2020 14:04) (93/70 - 154/63)  BP(mean): --  RR: 20 (23 Mar 2020 14:04) (20 - 24)  SpO2: 92% (23 Mar 2020 14:04) (90% - 93%)      I&O's Summary    22 Mar 2020 07:01  -  23 Mar 2020 07:00  --------------------------------------------------------  IN: 140 mL / OUT: 6 mL / NET: 134 mL    23 Mar 2020 07:01  -  23 Mar 2020 15:42  --------------------------------------------------------  IN: 350 mL / OUT: 127 mL / NET: 223 mL      CONSTITUTIONAL: Well appearing, well nourished, awake, alert and in no apparent distress  CARDIAC: Normal rate, regular rhythm.  Heart sounds S1, S2.  No murmurs, rubs or gallops   RESPIRATORY: Fair air entry   b/l rhonchi   GASTROENTEROLOGY : Soft nt nd bs+ normoactive   EXTREMITIES: No edema, cyanosis or deformity   NEUROLOGICAL: Alert and oriented, no focal deficits, no motor or sensory deficits.  SKIN: No rash, skin turgor wnl    MEDICATIONS  (STANDING):  digoxin     Tablet 0.25 milliGRAM(s) Oral daily  hydroxychloroquine 200 milliGRAM(s) Oral every 12 hours  levothyroxine 50 MICROGram(s) Oral daily  metoprolol tartrate 25 milliGRAM(s) Oral every 12 hours  warfarin 3 milliGRAM(s) Oral once    MEDICATIONS  (PRN):  acetaminophen   Tablet .. 650 milliGRAM(s) Oral every 6 hours PRN Temp greater or equal to 38C (100.4F), Mild Pain (1 - 3)      LABS:  PT/INR - ( 23 Mar 2020 10:31 )   PT: 42.9 sec;   INR: 3.65 ratio         RADIOLOGY & ADDITIONAL TESTS:  no new imaging studies Patient is a 79y old  Male who presents with a chief complaint of Fall (23 Mar 2020 11:48)  now with diarrhea       HEALTH ISSUES - PROBLEM Dx:  covid 19     INTERVAL HPI/OVERNIGHT EVENTS:  Patient seen and examined at bedside. No acute events overnight. Patient states he had multiple episodes of diarrhea. As per RN pt continues to soil himself. D/ w him could be related to his current viral infection or possible C. Diff. D/w rn will send c diff testing. Pt had thus far 3 episodes today and 5 yest. Patient denies chest pain, SOB, abd pain, N/V, fever, chills, dysuria or any other complaints. All remainder ROS negative.     Vital Signs Last 24 Hrs  T(C): 36.4 (23 Mar 2020 14:04), Max: 36.9 (22 Mar 2020 19:45)  T(F): 97.6 (23 Mar 2020 14:04), Max: 98.5 (22 Mar 2020 19:45)  HR: 76 (23 Mar 2020 14:04) (60 - 87)  BP: 136/67 (23 Mar 2020 14:04) (93/70 - 154/63)  BP(mean): --  RR: 20 (23 Mar 2020 14:04) (20 - 24)  SpO2: 92% (23 Mar 2020 14:04) (90% - 93%)      I&O's Summary    22 Mar 2020 07:01  -  23 Mar 2020 07:00  --------------------------------------------------------  IN: 140 mL / OUT: 6 mL / NET: 134 mL    23 Mar 2020 07:01  -  23 Mar 2020 15:42  --------------------------------------------------------  IN: 350 mL / OUT: 127 mL / NET: 223 mL      CONSTITUTIONAL: Well appearing, well nourished, awake, alert and in no apparent distress  CARDIAC: Normal rate, regular rhythm.  Heart sounds S1, S2.  No murmurs, rubs or gallops   RESPIRATORY: Fair air entry   b/l rhonchi   GASTROENTEROLOGY : Soft nt nd bs+ normoactive   EXTREMITIES: No edema, cyanosis or deformity   NEUROLOGICAL: Alert and oriented, no focal deficits, no motor or sensory deficits.  SKIN: No rash, skin turgor wnl    MEDICATIONS  (STANDING):  digoxin     Tablet 0.25 milliGRAM(s) Oral daily  hydroxychloroquine 200 milliGRAM(s) Oral every 12 hours  levothyroxine 50 MICROGram(s) Oral daily  metoprolol tartrate 25 milliGRAM(s) Oral every 12 hours  warfarin 3 milliGRAM(s) Oral once    MEDICATIONS  (PRN):  acetaminophen   Tablet .. 650 milliGRAM(s) Oral every 6 hours PRN Temp greater or equal to 38C (100.4F), Mild Pain (1 - 3)      LABS:  PT/INR - ( 23 Mar 2020 10:31 )   PT: 42.9 sec;   INR: 3.65 ratio         RADIOLOGY & ADDITIONAL TESTS:  no new imaging studies

## 2020-03-23 NOTE — DIETITIAN INITIAL EVALUATION ADULT. - OTHER INFO
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 He was seen in ER 3 days prior for flu-like- subjective fever, myalgias and cough x 3 days, also with diarrhea. He was diagnosed COVID-19 positive, wife also admitted to hospital with the same. He was discharged home with appropriate instructions. In ER, he is HD stable, denies any fever, chills, cough. Has mild diarrhea without any abdominal pain. was found to have OMAYRA and Hyponatremia. Information obtained from chart notes. Pt continues to have loose stool noted, pt is at times confused, and requires assistance with meals, appetite remains fair. Wt of 235# obtained from a previous admission 4.5 years ago (indicating 53# wt loss over past 5 years).

## 2020-03-23 NOTE — PROGRESS NOTE ADULT - SUBJECTIVE AND OBJECTIVE BOX
[facility]  INFECTIOUS DISEASES AND INTERNAL MEDICINE at [place]  =======================================================  Richi Brizuela MD  Diplomates American Board of Internal Medicine and Infectious Diseases  =======================================================    [id number]  [facility]     Follow up; COVID19    Doing well, minimal cough, no SOB with acceptable SO2, lowest 88 in room air.   No more fever.     PAST MEDICAL & SURGICAL HISTORY:  Afib  Respiratory insufficiency  CHF (congestive heart failure)  Mitral valve disease  HTN (hypertension)  H/O inguinal hernia: left  Chronic mitral valve disease: replaced    FAMILY HISTORY:  No pertinent family history in first degree relatives    Allergies  No Known Allergies    Antibiotics:  hydroxychloroquine     REVIEW OF SYSTEMS:  CONSTITUTIONAL:  No Fever or chills  HEENT:  No diplopia or blurred vision.  No sore throat or runny nose.  CARDIOVASCULAR:  No chest pain or SOB.  RESPIRATORY:  no cough, shortness of breath, PND or orthopnea.  GASTROINTESTINAL:  No nausea, vomiting or diarrhea.  GENITOURINARY:  No dysuria, frequency or urgency. No Blood in urine  MUSCULOSKELETAL:  no joint aches, no muscle pain  SKIN:  No change in skin, hair or nails.  NEUROLOGIC:  No paresthesias, fasciculations, seizures or weakness.  PSYCHIATRIC:  No disorder of thought or mood.  ENDOCRINE:  No heat or cold intolerance, polyuria or polydipsia.  HEMATOLOGICAL:  No easy bruising or bleeding.     Physical Exam:  Vital Signs Last 24 Hrs  T(C): 36.1 (23 Mar 2020 08:42), Max: 36.9 (22 Mar 2020 19:45)  T(F): 96.9 (23 Mar 2020 08:42), Max: 98.5 (22 Mar 2020 19:45)  HR: 60 (23 Mar 2020 08:42) (60 - 88)  BP: 93/70 (23 Mar 2020 08:42) (93/70 - 154/63)  BP(mean): --  RR: 20 (23 Mar 2020 08:42) (20 - 24)  SpO2: 90% (23 Mar 2020 08:42) (90% - 94%)  GEN: NAD  HEENT: normocephalic and atraumatic. EOMI. PERRL.    NECK: Supple.  No lymphadenopathy   LUNGS: Clear to auscultation.  HEART: Regular rate and rhythm 2/6 systolic murmur.  ABDOMEN: Soft, nontender, and nondistended.  Positive bowel sounds.    : No CVA tenderness  EXTREMITIES: Without any cyanosis, clubbing, rash, lesions or edema.  NEUROLOGIC: grossly intact.  PSYCHIATRIC: Appropriate affect .  SKIN: No rash     Labs:  PT/INR - ( 23 Mar 2020 10:31 )   PT: 42.9 sec;   INR: 3.65 ratio      RECENT CULTURES:  COVID +     All imaging and other data have been reviewed.  < from: CT Angio Abd Aorta w/run-off w/ IV Cont (01.25.20 @ 11:49) >  EXAM:  CT ANGIO ABD AOR W RUN(W)AW IC    PROCEDURE DATE:  01/25/2020    INTERPRETATION:  CLINICAL INFORMATION: Follow-up abdominal aortic aneurysm.  COMPARISON: CT arteriogram of the abdomen and pelvis dated 6/18/2019.  CT ANGIOGRAM ABDOMEN, PELVIS, AND LOWER EXTREMITIES:  PROCEDURE:   Initially, nonenhanced CT was obtained through the calves. Then, following the rapid administration of intravenous contrast, CT angiography was performed through the abdomen, pelvis, and lower extremities down to the toes.  Delayed images through the calves were also obtained. Sagittal and coronal reformats as well as 3D reconstructions were performed.  125 ml of Omnipaque 350 was administered intravenously without complication and 25 ml were discarded.  FINDINGS:  CENTRAL ARTERIAL SYSTEM:   There is a 4.7 cm infrarenal abdominal aortic aneurysm, unchanged in size from prior exam. The aneurysm remains thrombosed. Thrombosis of the remainder of the aorta, both common iliac arteriesand both external iliac arteries persists. Note is made of a replaced right hepatic artery. There is mild narrowing of the left renal artery. 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 The peripheral distribution of the BIA is reconstituted via collaterals from the arc of Riolin.  RIGHT LOWER EXTREMITY:  The common femoral artery is reconstituted via collaterals from the epigastric arteries. The femoral and popliteal arteries are widely patent. Mild atheromatous plaque is present in the tibioperoneal trunk. There is normal three-vessel runoff in the calf.  LEFT LOWER EXTREMITY:  The common femoral artery is reconstituted via collaterals from the epigastric arteries. The femoral arteries are widely patent. There is mild atheromatous plaque in the peroneal artery. There is normal three-vessel runoff in the calf.  ADDITIONAL FINDINGS:   Mitral valve replacement.  Transvenous pacemaker.  Fatty replacement of the pancreatic head.  Irregular cortical thinning of both kidneys, likely scarring.  Sigmoid diverticulosis.  Grade 2 anterolisthesis of L5 and S1 with bilateral L5 spondylolysis.  IMPRESSION:  Stable 4.7 cm infrarenal abdominal aortic aneurysm.  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 He was in ED 3 days prior for flu like symptoms, fever, myalgias, diarrhea and cough for 3 days. He was diagnosed with COVID-19 positive, wife also admitted to hospital with the same.    COVID19  Multiple comorbidities     - No fever  - No leukocytosis  - Hemodynamically stable and good O2 sat in room air.   - Today last day of Hydroxychloroquine   - Awaiting placement.     Will sign off please call with any question.

## 2020-03-23 NOTE — DIETITIAN INITIAL EVALUATION ADULT. - ETIOLOGY
Related to inability to meet sufficient energy requirements in setting of advanced age and now with altered GI function (+diarrhea in setting of COVID-19)

## 2020-03-24 LAB
ANION GAP SERPL CALC-SCNC: 15 MMOL/L — SIGNIFICANT CHANGE UP (ref 5–17)
BUN SERPL-MCNC: 27 MG/DL — HIGH (ref 8–20)
CALCIUM SERPL-MCNC: 9.2 MG/DL — SIGNIFICANT CHANGE UP (ref 8.6–10.2)
CHLORIDE SERPL-SCNC: 97 MMOL/L — LOW (ref 98–107)
CO2 SERPL-SCNC: 20 MMOL/L — LOW (ref 22–29)
CREAT SERPL-MCNC: 1.13 MG/DL — SIGNIFICANT CHANGE UP (ref 0.5–1.3)
DIGOXIN SERPL-MCNC: 1 NG/ML — SIGNIFICANT CHANGE UP (ref 0.8–2)
GLUCOSE SERPL-MCNC: 106 MG/DL — HIGH (ref 70–99)
INR BLD: 3.9 RATIO — HIGH (ref 0.88–1.16)
POTASSIUM SERPL-MCNC: 4 MMOL/L — SIGNIFICANT CHANGE UP (ref 3.5–5.3)
POTASSIUM SERPL-SCNC: 4 MMOL/L — SIGNIFICANT CHANGE UP (ref 3.5–5.3)
PROTHROM AB SERPL-ACNC: 46 SEC — HIGH (ref 10–12.9)
SODIUM SERPL-SCNC: 132 MMOL/L — LOW (ref 135–145)

## 2020-03-24 PROCEDURE — 99232 SBSQ HOSP IP/OBS MODERATE 35: CPT

## 2020-03-24 RX ORDER — OXYCODONE AND ACETAMINOPHEN 5; 325 MG/1; MG/1
1 TABLET ORAL ONCE
Refills: 0 | Status: DISCONTINUED | OUTPATIENT
Start: 2020-03-24 | End: 2020-03-24

## 2020-03-24 RX ORDER — TIOTROPIUM BROMIDE 18 UG/1
1 CAPSULE ORAL; RESPIRATORY (INHALATION) DAILY
Refills: 0 | Status: DISCONTINUED | OUTPATIENT
Start: 2020-03-24 | End: 2020-04-01

## 2020-03-24 RX ORDER — ALBUTEROL 90 UG/1
2 AEROSOL, METERED ORAL EVERY 6 HOURS
Refills: 0 | Status: DISCONTINUED | OUTPATIENT
Start: 2020-03-24 | End: 2020-04-01

## 2020-03-24 RX ADMIN — ALBUTEROL 2 PUFF(S): 90 AEROSOL, METERED ORAL at 21:39

## 2020-03-24 RX ADMIN — Medication 50 MICROGRAM(S): at 05:12

## 2020-03-24 RX ADMIN — ALBUTEROL 2 PUFF(S): 90 AEROSOL, METERED ORAL at 14:52

## 2020-03-24 RX ADMIN — Medication 650 MILLIGRAM(S): at 09:23

## 2020-03-24 RX ADMIN — Medication 650 MILLIGRAM(S): at 01:19

## 2020-03-24 RX ADMIN — Medication 25 MILLIGRAM(S): at 17:09

## 2020-03-24 RX ADMIN — Medication 650 MILLIGRAM(S): at 08:43

## 2020-03-24 RX ADMIN — OXYCODONE AND ACETAMINOPHEN 1 TABLET(S): 5; 325 TABLET ORAL at 20:55

## 2020-03-24 RX ADMIN — Medication 0.25 MILLIGRAM(S): at 05:12

## 2020-03-24 RX ADMIN — Medication 650 MILLIGRAM(S): at 02:00

## 2020-03-24 RX ADMIN — TIOTROPIUM BROMIDE 1 CAPSULE(S): 18 CAPSULE ORAL; RESPIRATORY (INHALATION) at 14:52

## 2020-03-24 RX ADMIN — Medication 650 MILLIGRAM(S): at 17:09

## 2020-03-24 RX ADMIN — OXYCODONE AND ACETAMINOPHEN 1 TABLET(S): 5; 325 TABLET ORAL at 21:55

## 2020-03-24 RX ADMIN — Medication 25 MILLIGRAM(S): at 05:12

## 2020-03-24 NOTE — PROGRESS NOTE ADULT - ASSESSMENT
79 year old male with hx of HTN, Afib on coumadin, Mechanical Mitral valve, chronic diastolic HF, CVA presented to ER generalized weakness, flu like sx x 3 days and s/p fall out of bed. Pan ct head/ c spine negative. Noted to be covid positive. Pt admitted with COVID 19. Empirically remained on hydroxychloroquin and vit c completed on 3/23. Improving sob. Hospital course complicated by multiple episodes of diarrhea likely related to covid, now improving.  PT consulted and recommended RODOLFO, authorization in process.     # COVID positive   - Improving sob, dry cough remains   - c/w airborne and contact isolation precautions  - c/w supportive treatment  -c/w  tylenol prn for fever  - c/w robitussin prn   - c/w tessalone pearls prn   - s/p hydroxychloroquine and vitamin c  ended on 3/23  - ID f/u noted and appreciated    Diarrhea  -could be related to covid   - now slowing down, no episodes today   - not C diff   - c/w monitoring stool count     # S/p fall 2/2 generalized weakness 2/2 hypovolemia and covid 19  - no injuries noted on exam   - imaging studies negative   - PT evaluation noted     # OMAYRA on CKD 3  - resolved at baseline   - prerenal azotemia resolved   - Creatine back to baseline   - avoid nephrotoxic medications  - lisinopril held     # Hyponatremia  2/2 hypovolemia - resolved   - sodium improving and stable    # Chronic Afib   -hx of mechanical valve -MVR   - rate controlled  - c/w dig and metoprolol  - pending INR for today   - goal is 2.5-3.5  - will dose coumadin based on INR   - f/u INR in the am  -cardio f/u noted and appreciated      # Chronic diastolic CHF  - appears euvolemic  - echo in 2015 showing ef 65%  - c/w metoprolol po bid  - holding lisinopril in the setting of covid  - cardio f/u noted and appreciated     #AAA  -Stable 4.7 cm infrarenal abdominal aortic aneurysm. Persistent occlusion of the abdominal aortic aneurysm, infrarenal aorta, and bilateral iliac arteries with distal reconstitution of the common femoral arteries.  - Pt to f/u outpt with vascular sx     #HTN  = bp stable   - monitor blood pressure  -c/w metoprolol  - holding lisinopril in the setting of covid     #Hypothyroidism   - levothyroxine     # DVT prophylaxis  - covered on coumadin   - scd boots b/l   Next of kin: Aida-daughter     Dispo: PT consulted and recommended RODOLFO, pending rodolfo placement. D/w CM the plan of care.

## 2020-03-24 NOTE — PROGRESS NOTE ADULT - SUBJECTIVE AND OBJECTIVE BOX
Patient is a 79y old  Male who presents with a chief complaint of Fall (23 Mar 2020 15:41) pending dispo       HEALTH ISSUES - PROBLEM Dx:  covid 19   fall         INTERVAL HPI/OVERNIGHT EVENTS:  Patient seen and examined at bedside. No acute events overnight. Patient states he is feeling much better, diarrhea resolved today with no episodes thus far. Aside from this pt with improving cough, minimal now. Patient denies chest pain, SOB, abd pain, N/V, fever, chills, dysuria or any other complaints. All remainder ROS negative.     Vital Signs Last 24 Hrs  T(C): 36.4 (24 Mar 2020 08:30), Max: 37.2 (23 Mar 2020 21:43)  T(F): 97.5 (24 Mar 2020 08:30), Max: 98.9 (23 Mar 2020 21:43)  HR: 88 (24 Mar 2020 08:30) (73 - 91)  BP: 153/50 (24 Mar 2020 08:30) (135/75 - 153/50)  BP(mean): 93 (24 Mar 2020 05:10) (82 - 93)  RR: 18 (24 Mar 2020 08:30) (18 - 20)  SpO2: 91% (24 Mar 2020 08:30) (91% - 93%)    I&O's Summary    23 Mar 2020 07:01  -  24 Mar 2020 07:00  --------------------------------------------------------  IN: 470 mL / OUT: 227 mL / NET: 243 mL      CONSTITUTIONAL: Well appearing, well nourished, awake, alert and in no apparent distress  CARDIAC: Normal rate, regular rhythm.  Heart sounds S1, S2.  No murmurs, rubs or gallops   RESPIRATORY: Fair air entry   b/l rhonchi   GASTROENTEROLOGY : Soft nt nd bs+ normoactive   EXTREMITIES: No edema, cyanosis or deformity   NEUROLOGICAL: Alert and oriented, no focal deficits, no motor or sensory deficits.  SKIN: No rash, skin turgor wnl    MEDICATIONS  (STANDING):  digoxin     Tablet 0.25 milliGRAM(s) Oral daily  levothyroxine 50 MICROGram(s) Oral daily  metoprolol tartrate 25 milliGRAM(s) Oral every 12 hours    MEDICATIONS  (PRN):  acetaminophen   Tablet .. 650 milliGRAM(s) Oral every 6 hours PRN Temp greater or equal to 38C (100.4F), Mild Pain (1 - 3)      LABS:    PT/INR - ( 23 Mar 2020 10:31 )   PT: 42.9 sec;   INR: 3.65 ratio         RADIOLOGY & ADDITIONAL TESTS:  No new imaging studies

## 2020-03-25 LAB
INR BLD: 3.08 RATIO — HIGH (ref 0.88–1.16)
PROTHROM AB SERPL-ACNC: 36 SEC — HIGH (ref 10–12.9)

## 2020-03-25 PROCEDURE — 73030 X-RAY EXAM OF SHOULDER: CPT | Mod: 26,LT

## 2020-03-25 PROCEDURE — 99232 SBSQ HOSP IP/OBS MODERATE 35: CPT

## 2020-03-25 RX ORDER — LIDOCAINE 4 G/100G
1 CREAM TOPICAL DAILY
Refills: 0 | Status: DISCONTINUED | OUTPATIENT
Start: 2020-03-25 | End: 2020-04-01

## 2020-03-25 RX ORDER — WARFARIN SODIUM 2.5 MG/1
4 TABLET ORAL ONCE
Refills: 0 | Status: COMPLETED | OUTPATIENT
Start: 2020-03-25 | End: 2020-03-25

## 2020-03-25 RX ADMIN — WARFARIN SODIUM 4 MILLIGRAM(S): 2.5 TABLET ORAL at 22:10

## 2020-03-25 RX ADMIN — Medication 0.25 MILLIGRAM(S): at 05:17

## 2020-03-25 RX ADMIN — LIDOCAINE 1 PATCH: 4 CREAM TOPICAL at 21:49

## 2020-03-25 RX ADMIN — Medication 650 MILLIGRAM(S): at 07:32

## 2020-03-25 RX ADMIN — ALBUTEROL 2 PUFF(S): 90 AEROSOL, METERED ORAL at 15:37

## 2020-03-25 RX ADMIN — LIDOCAINE 1 PATCH: 4 CREAM TOPICAL at 17:13

## 2020-03-25 RX ADMIN — Medication 650 MILLIGRAM(S): at 00:12

## 2020-03-25 RX ADMIN — ALBUTEROL 2 PUFF(S): 90 AEROSOL, METERED ORAL at 03:10

## 2020-03-25 RX ADMIN — Medication 25 MILLIGRAM(S): at 05:18

## 2020-03-25 RX ADMIN — Medication 50 MICROGRAM(S): at 05:18

## 2020-03-25 RX ADMIN — ALBUTEROL 2 PUFF(S): 90 AEROSOL, METERED ORAL at 21:23

## 2020-03-25 RX ADMIN — ALBUTEROL 2 PUFF(S): 90 AEROSOL, METERED ORAL at 08:44

## 2020-03-25 RX ADMIN — Medication 650 MILLIGRAM(S): at 09:44

## 2020-03-25 RX ADMIN — Medication 650 MILLIGRAM(S): at 19:23

## 2020-03-25 RX ADMIN — Medication 650 MILLIGRAM(S): at 01:06

## 2020-03-25 RX ADMIN — Medication 25 MILLIGRAM(S): at 17:15

## 2020-03-25 RX ADMIN — TIOTROPIUM BROMIDE 1 CAPSULE(S): 18 CAPSULE ORAL; RESPIRATORY (INHALATION) at 08:42

## 2020-03-25 NOTE — PROGRESS NOTE ADULT - SUBJECTIVE AND OBJECTIVE BOX
Patient is a 79y old  Male who presents with a chief complaint of Fall (24 Mar 2020 10:17) with left shoulder pain/ pending placement.       HEALTH ISSUES - PROBLEM Dx:  COVID 19   viral pneumonia   diarrhea  electrolyte abnormalities         INTERVAL HPI/OVERNIGHT EVENTS:  Patient seen and examined at bedside. No acute events overnight. Patient states he continues to feel left sided upper chest/ shoulder pain. D/w him his initial imaging was negative and his shoulder x ray was negative. Also stated will do ct upper extremity to make sure no fx or anything else present. Patient denies chest pain, SOB, abd pain, N/V, fever, chills, dysuria or any other complaints. All remainder ROS negative.     Vital Signs Last 24 Hrs  T(C): 36.6 (25 Mar 2020 08:19), Max: 36.9 (24 Mar 2020 20:11)  T(F): 97.9 (25 Mar 2020 08:19), Max: 98.4 (24 Mar 2020 20:11)  HR: 60 (25 Mar 2020 08:43) (60 - 93)  BP: 138/68 (25 Mar 2020 08:19) (138/68 - 160/70)  BP(mean): --  RR: 18 (25 Mar 2020 08:19) (18 - 20)  SpO2: 94% (25 Mar 2020 08:43) (91% - 95%)      I&O's Summary    24 Mar 2020 07:01  -  25 Mar 2020 07:00  --------------------------------------------------------  IN: 120 mL / OUT: 0 mL / NET: 120 mL      CONSTITUTIONAL: Well appearing, well nourished, awake, alert and in no apparent distress  CARDIAC: Normal rate, regular rhythm.  Heart sounds S1, S2.  No murmurs, rubs or gallops   RESPIRATORY: Fair air entry   b/l rhonchi   GASTROENTEROLOGY : Soft nt nd bs+ normoactive   EXTREMITIES: No edema, cyanosis or deformity   NEUROLOGICAL: Alert and oriented, no focal deficits, no motor or sensory deficits.  SKIN: No rash, skin turgor wnl    MEDICATIONS  (STANDING):  ALBUTerol    90 MICROgram(s) HFA Inhaler 2 Puff(s) Inhalation every 6 hours  digoxin     Tablet 0.25 milliGRAM(s) Oral daily  levothyroxine 50 MICROGram(s) Oral daily  lidocaine   Patch 1 Patch Transdermal daily  metoprolol tartrate 25 milliGRAM(s) Oral every 12 hours  tiotropium 18 MICROgram(s) Capsule 1 Capsule(s) Inhalation daily  warfarin 4 milliGRAM(s) Oral once    MEDICATIONS  (PRN):  acetaminophen   Tablet .. 650 milliGRAM(s) Oral every 6 hours PRN Temp greater or equal to 38C (100.4F), Mild Pain (1 - 3)      LABS:    03-24    132<L>  |  97<L>  |  27.0<H>  ----------------------------<  106<H>  4.0   |  20.0<L>  |  1.13    Ca    9.2      24 Mar 2020 14:34      PT/INR - ( 25 Mar 2020 10:53 )   PT: 36.0 sec;   INR: 3.08 ratio           RADIOLOGY & ADDITIONAL TESTS:  no new imaging studies

## 2020-03-25 NOTE — PROGRESS NOTE ADULT - ASSESSMENT
79 year old male with hx of HTN, Afib on coumadin, Mechanical Mitral valve, chronic diastolic HF, CVA presented to ER generalized weakness, flu like sx x 3 days and s/p fall out of bed. Pan ct head/ c spine negative. Noted to be covid positive. Pt admitted with COVID 19. Empirically remained on hydroxychloroquin and vit c completed on 3/23. Improving sob. Hospital course complicated by multiple episodes of diarrhea likely related to covid, now improving. Electrolytes stable. Also with worsening left shoulder pain, left shoulder x ray negative pending ct of the shoulder.  PT consulted and recommended RODOLFO, authorization in process,     COVID positive   - Improving sob, dry cough remains   - diarrhea resolved   - electrolytes stable   - c/w airborne and contact isolation precautions  - c/w supportive treatment  -c/w  tylenol prn for fever  - c/w robitussin prn   - c/w tessalone pearls prn   - s/p hydroxychloroquine and vitamin c  ended on 3/23  - ID f/u noted and appreciated  - In order to get into RODOLFO pt needed 2 covid pcr negative, will order one today and one for tomorrow     S/p fall 2/2 generalized weakness 2/2 hypovolemia and covid 19  - no injuries noted on exam but with left shoulder pain which persists   - ct head/ c spine negative  - left shoulder x ray negative  - ct left shoulder pending   - c/w tylenol prn for pain   - c/w lidocaine patch to left shoulder   - PT evaluation noted     OMAYRA on CKD 3  - resolved at baseline   - prerenal azotemia resolved   - Creatine back to baseline   - avoid nephrotoxic medications  - lisinopril held     Chronic Afib   -hx of mechanical valve -MVR   - rate controlled  - c/w dig and metoprolol  - INR: 3   - goal is 2.5-3.5  - will dose coumadin 4mg po qhs   - f/u INR in the am  -cardio f/u noted and appreciated      Chronic diastolic CHF  - appears euvolemic  - echo in 2015 showing ef 65%  - c/w metoprolol po bid  - holding lisinopril in the setting of covid  - cardio f/u noted and appreciated     AAA  -Stable 4.7 cm infrarenal abdominal aortic aneurysm. Persistent occlusion of the abdominal aortic aneurysm, infrarenal aorta, and bilateral iliac arteries with distal reconstitution of the common femoral arteries.  - Pt to f/u outpt with vascular sx     HTN  = bp stable   - monitor blood pressure  -c/w metoprolol  - holding lisinopril in the setting of covid     Hypothyroidism   - levothyroxine     DVT prophylaxis  - covered on coumadin   - scd boots b/l   Next of kin: Aida-daughter     Dispo: PT consulted and recommended RODOLFO, pending rodolfo placement. D/w CM and RODOLFO wants 2 negative covid pcr prior to acceptance. So one ordered today and one will be ordered tomorrow. In case rodolfo is not possible or takes multiple days , pt will c/w daily PT to see if goals met prior to RODOLFO auth. 79 year old male with hx of HTN, Afib on coumadin, Mechanical Mitral valve, chronic diastolic HF, CVA presented to ER generalized weakness, flu like sx x 3 days and s/p fall out of bed. Pan ct head/ c spine negative. Noted to be covid positive. Pt admitted with COVID 19. Empirically remained on hydroxychloroquin and vit c which was completed on 3/23. Improving sob/ cough. Hospital course complicated by multiple episodes of diarrhea likely related to covid, nowresolved. Electrolytes stable. Also with worsening left shoulder pain, left shoulder x ray negative.  PT consulted and recommended RODOLFO, authorization in process, given covid positive likely difficult dispo, in the interim daily PT until patient either meets goals to go home or obtains auth for rodolfo.     COVID positive   - Improving sob, dry cough remains   - diarrhea resolved   - electrolytes stable   - c/w airborne and contact isolation precautions  - c/w supportive treatment  -c/w  tylenol prn for fever  - c/w robitussin prn   - c/w tessalone pearls prn   - s/p hydroxychloroquine and vitamin c  ended on 3/23  - ID f/u noted and appreciated  - In order to get into RODOLFO pt needed 2 covid pcr negative, will order one today and one for tomorrow     S/p fall 2/2 generalized weakness 2/2 hypovolemia and covid 19  - no injuries noted on exam but with left shoulder pain which persists   - ct head/ c spine negative  - left shoulder x ray negative  - ct left shoulder pending   - c/w tylenol prn for pain   - c/w lidocaine patch to left shoulder   - PT evaluation noted     OMAYRA on CKD 3  - resolved at baseline   - prerenal azotemia resolved   - Creatine back to baseline   - avoid nephrotoxic medications  - lisinopril held     Chronic Afib   -hx of mechanical valve -MVR   - rate controlled  - c/w dig and metoprolol  - INR: 3   - goal is 2.5-3.5  - will dose coumadin 4mg po qhs   - f/u INR in the am  -cardio f/u noted and appreciated      Chronic diastolic CHF  - appears euvolemic  - echo in 2015 showing ef 65%  - c/w metoprolol po bid  - holding lisinopril in the setting of covid  - cardio f/u noted and appreciated     AAA  -Stable 4.7 cm infrarenal abdominal aortic aneurysm. Persistent occlusion of the abdominal aortic aneurysm, infrarenal aorta, and bilateral iliac arteries with distal reconstitution of the common femoral arteries.  - Pt to f/u outpt with vascular sx     HTN  = bp stable   - monitor blood pressure  -c/w metoprolol  - holding lisinopril in the setting of covid     Hypothyroidism   - levothyroxine     DVT prophylaxis  - covered on coumadin   - scd boots b/l   Next of kin: Aida-daughter     Dispo: PT consulted and recommended RODOLFO, pending rodolfo placement. D/w CM and RODOLFO wants 2 negative covid pcr prior to acceptance. So one ordered today and one will be ordered tomorrow. In case rodolfo is not possible or takes multiple days , pt will c/w daily PT to see if goals met prior to RODOLFO auth.

## 2020-03-26 LAB
ANION GAP SERPL CALC-SCNC: 15 MMOL/L — SIGNIFICANT CHANGE UP (ref 5–17)
BUN SERPL-MCNC: 28 MG/DL — HIGH (ref 8–20)
CALCIUM SERPL-MCNC: 9.1 MG/DL — SIGNIFICANT CHANGE UP (ref 8.6–10.2)
CHLORIDE SERPL-SCNC: 92 MMOL/L — LOW (ref 98–107)
CO2 SERPL-SCNC: 20 MMOL/L — LOW (ref 22–29)
CREAT SERPL-MCNC: 1.14 MG/DL — SIGNIFICANT CHANGE UP (ref 0.5–1.3)
GLUCOSE SERPL-MCNC: 100 MG/DL — HIGH (ref 70–99)
INR BLD: 2.08 RATIO — HIGH (ref 0.88–1.16)
INR BLD: 2.09 RATIO — HIGH (ref 0.88–1.16)
POTASSIUM SERPL-MCNC: 4.5 MMOL/L — SIGNIFICANT CHANGE UP (ref 3.5–5.3)
POTASSIUM SERPL-SCNC: 4.5 MMOL/L — SIGNIFICANT CHANGE UP (ref 3.5–5.3)
PROTHROM AB SERPL-ACNC: 24.1 SEC — HIGH (ref 10–12.9)
PROTHROM AB SERPL-ACNC: 24.2 SEC — HIGH (ref 10–12.9)
SARS-COV-2 RNA SPEC QL NAA+PROBE: DETECTED
SODIUM SERPL-SCNC: 127 MMOL/L — LOW (ref 135–145)

## 2020-03-26 PROCEDURE — 99232 SBSQ HOSP IP/OBS MODERATE 35: CPT

## 2020-03-26 RX ORDER — CHOLECALCIFEROL (VITAMIN D3) 125 MCG
1000 CAPSULE ORAL DAILY
Refills: 0 | Status: DISCONTINUED | OUTPATIENT
Start: 2020-03-26 | End: 2020-04-01

## 2020-03-26 RX ORDER — LOPERAMIDE HCL 2 MG
2 TABLET ORAL THREE TIMES A DAY
Refills: 0 | Status: DISCONTINUED | OUTPATIENT
Start: 2020-03-26 | End: 2020-03-28

## 2020-03-26 RX ORDER — THIAMINE MONONITRATE (VIT B1) 100 MG
100 TABLET ORAL DAILY
Refills: 0 | Status: DISCONTINUED | OUTPATIENT
Start: 2020-03-26 | End: 2020-04-01

## 2020-03-26 RX ORDER — WARFARIN SODIUM 2.5 MG/1
7 TABLET ORAL ONCE
Refills: 0 | Status: COMPLETED | OUTPATIENT
Start: 2020-03-26 | End: 2020-03-26

## 2020-03-26 RX ORDER — SODIUM CHLORIDE 9 MG/ML
1000 INJECTION INTRAMUSCULAR; INTRAVENOUS; SUBCUTANEOUS
Refills: 0 | Status: DISCONTINUED | OUTPATIENT
Start: 2020-03-26 | End: 2020-03-27

## 2020-03-26 RX ADMIN — Medication 25 MILLIGRAM(S): at 05:52

## 2020-03-26 RX ADMIN — Medication 0.25 MILLIGRAM(S): at 05:52

## 2020-03-26 RX ADMIN — LIDOCAINE 1 PATCH: 4 CREAM TOPICAL at 19:15

## 2020-03-26 RX ADMIN — LIDOCAINE 1 PATCH: 4 CREAM TOPICAL at 06:03

## 2020-03-26 RX ADMIN — ALBUTEROL 2 PUFF(S): 90 AEROSOL, METERED ORAL at 03:26

## 2020-03-26 RX ADMIN — WARFARIN SODIUM 7 MILLIGRAM(S): 2.5 TABLET ORAL at 22:11

## 2020-03-26 RX ADMIN — LIDOCAINE 1 PATCH: 4 CREAM TOPICAL at 11:50

## 2020-03-26 RX ADMIN — ALBUTEROL 2 PUFF(S): 90 AEROSOL, METERED ORAL at 16:10

## 2020-03-26 RX ADMIN — ALBUTEROL 2 PUFF(S): 90 AEROSOL, METERED ORAL at 08:35

## 2020-03-26 RX ADMIN — Medication 100 MILLIGRAM(S): at 19:11

## 2020-03-26 RX ADMIN — SODIUM CHLORIDE 60 MILLILITER(S): 9 INJECTION INTRAMUSCULAR; INTRAVENOUS; SUBCUTANEOUS at 19:13

## 2020-03-26 RX ADMIN — Medication 25 MILLIGRAM(S): at 19:11

## 2020-03-26 RX ADMIN — Medication 50 MICROGRAM(S): at 05:52

## 2020-03-26 RX ADMIN — ALBUTEROL 2 PUFF(S): 90 AEROSOL, METERED ORAL at 21:36

## 2020-03-26 RX ADMIN — TIOTROPIUM BROMIDE 1 CAPSULE(S): 18 CAPSULE ORAL; RESPIRATORY (INHALATION) at 08:35

## 2020-03-26 RX ADMIN — Medication 1000 UNIT(S): at 19:11

## 2020-03-26 NOTE — PROGRESS NOTE ADULT - ASSESSMENT
79 year old male with hx of HTN, Afib on coumadin, Mechanical Mitral valve, chronic diastolic HF, CVA presented to ER generalized weakness, flu like sx x 3 days and s/p fall out of bed. Pan ct head/ c spine negative. Noted to be covid positive. Pt admitted with COVID 19. Empirically remained on hydroxychloroquin and vit c which was completed on 3/23. Improving sob/ cough. Will repeat cxr as a follow up. Hospital course complicated by multiple episodes of diarrhea likely related to covid, started on imodium, noted hyponatremia for which IVF started. Also with worsening left shoulder pain, left shoulder x ray negative.  PT consulted and recommended [facility], authorization in process, given covid positive likely difficult dispo, in the interim daily PT until patient either meets goals to go home or obtains auth for rodolfo.     COVID positive   - Improving sob, dry cough remains   - diarrhea with 3 episodes today   - electrolytes stable but with hyponatremia   - c/w airborne and contact isolation precautions  - c/w IVF x 12 hours   - c/w supportive treatment  -c/w  tylenol prn for fever  - c/w robitussin prn   - c/w tessalone pearls prn   - s/p hydroxychloroquine and vitamin c  ended on 3/23  - ID f/u noted and appreciated  - In order to get into [facility] pt needed 2 covid pcr negative, pending from 3/25 and 3/26     S/p fall 2/2 generalized weakness 2/2 hypovolemia and covid 19  - no injuries noted on exam but with left shoulder pain which persists   - ct head/ c spine negative  - left shoulder x ray negative  - ct left shoulder pending   - c/w tylenol prn for pain   - c/w lidocaine patch to left shoulder   - PT evaluation noted     OMAYRA on CKD 3  - resolved at baseline   - prerenal azotemia resolved   - Creatine back to baseline   - avoid nephrotoxic medications  - lisinopril held     Chronic Afib   -hx of mechanical valve -MVR   - rate controlled  - c/w dig and metoprolol  - INR: 2  - goal is 2.5-3.5  - will dose coumadin 7mg po qhs tonight   - f/u INR in the am  -cardio f/u noted and appreciated , D/w Dr. Capone no need to bridge for now unless INR below 2 then would have to bridge     Chronic diastolic CHF  - Appears euvolemic  - echo in 2015 showing ef 65%  - c/w metoprolol po bid  - holding lisinopril in the setting of covid  - cardio f/u noted and appreciated     AAA  -Stable 4.7 cm infrarenal abdominal aortic aneurysm. Persistent occlusion of the abdominal aortic aneurysm, infrarenal aorta, and bilateral iliac arteries with distal reconstitution of the common femoral arteries.  - Pt to f/u outpt with vascular sx     HTN  = bp stable   - monitor blood pressure  -c/w metoprolol  - holding lisinopril in the setting of covid     Hypothyroidism   - levothyroxine     DVT prophylaxis  - covered on coumadin   - scd boots b/l   Next of kin: Aida-daughter     Dispo: PT consulted and recommended [facility], pending [facility]fo placement. D/w CM and [facility] wants 2 negative covid pcr prior to acceptance.  In case rodolfo is not possible or takes multiple days , pt will c/w daily PT to see if goals met prior to [facility] auth. 79 year old male with hx of HTN, Afib on coumadin s/p PPM, Mechanical Mitral valve, chronic diastolic HF, CVA presented to ER generalized weakness, flu like sx x 3 days and s/p fall out of bed. Pan ct head/ c spine negative. Noted to be covid positive. Pt admitted with COVID 19. Empirically remained on hydroxychloroquin and vit c which was completed on 3/23. Improving sob/ cough. Hospital course complicated by multiple episodes of diarrhea likely related to covid, started on imodium, noted hyponatremia for which transient IVF started. Also with worsening left shoulder pain, left shoulder x ray negative. Also pt initially presented with supra therapeutic INR on coumadin, which down trended and currently is sub therapeutic, given mechanical valve, dosing coumadin aggressively.  PT consulted and recommended [facility], authorization in process, given covid positive and repeat pcr positive likely difficult dispo, in the interim daily PT until patient either meets goals to go home or obtains auth for rodolfo.     COVID 19 infection   - Improving sob, dry cough remains intermittently   - diarrhea with 2 episodes today and 3 episodes yest   - electrolytes stable but with hyponatremia likely from hypovolemia from GI losses   - c/w airborne and contact isolation precautions  - c/w IVF x 8 hours monitoring closely given hx HF   - c/w supportive treatment  -c/w  tylenol prn for fever  - c/w robitussin prn   - c/w tessalone pearls prn   -c/w imodium prn for diarrhea   - s/p hydroxychloroquine and vitamin c  ended on 3/23  - ID f/u noted and appreciated, d/w Dr. Hess and unclear as to when the PCR would turn negative for the pt could be 14 days if not longer   - In order to get into [facility] pt needed 2 covid pcr negative, pending from 3/25 and 3/26     Diarrhea 2/2 COVID 19  - pt still is having about 2-3 episodes a day  - not watery just soft  - d/w lab and tried to send for C. Diff but refused x 2 bc does not qualify, giving slowing down likely related to infection with covid  - c/w supportive care for losses     S/p fall 2/2 generalized weakness 2/2 hypovolemia and covid 19  - no injuries noted on exam but with left shoulder pain which persists likely arthritis related   - ct head/ c spine negative  - left shoulder x ray negative  - c/w tylenol prn for pain   - c/w lidocaine patch to left shoulder   - PT evaluation noted     OMAYRA on CKD 3  - resolved at baseline   - prerenal azotemia resolved   - Creatine back to baseline   - avoid nephrotoxic medications  - lisinopril held     Chronic Afib   -hx of mechanical valve -MVR and PPM   - rate controlled  - c/w dig and metoprolol  - INR: 2  - goal is 2.5-3.5  - will dose coumadin 10mg po qhs tonight   - f/u INR in the am  -cardio f/u noted and appreciated , D/w Dr. Capone no need to bridge for now unless INR below 2 then would have to bridge     Chronic diastolic CHF  - Appears euvolemic  - echo in 2015 showing ef 65%  - c/w metoprolol po bid  - holding lisinopril in the setting of covid  - c/w holding lasix until GI losses improve   - cardio f/u noted and appreciated     AAA  -Stable 4.7 cm infrarenal abdominal aortic aneurysm. Persistent occlusion of the abdominal aortic aneurysm, infrarenal aorta, and bilateral iliac arteries with distal reconstitution of the common femoral arteries.  - compared to prior ct and stable at this time   - Pt to f/u outpt with vascular sx     HTN  - bp stable   - monitor blood pressure  -c/w metoprolol  - holding lisinopril in the setting of covid     Hypothyroidism   - c/w levothyroxine po qd     DVT prophylaxis  - covered on coumadin   - scd boots b/l   Next of kin: Aida-daughter     Dispo: PT consulted and recommended [facility], pending rodolfo placement. D/w CM and [facility] wants 2 negative covid pcr prior to acceptance, both pcr came back positive thus far and pt has been in the hospital for 10 days and has had the infection for over 10 days.  In case rodolfo is not possible given the requirements to get in, pt will c/w daily PT to see if goals met prior to [facility] auth. Will re order covid in 2-3 days and see if pcr returns as negative at that time.     Of note the daughter Aida is very aggressive and has been disrespectful to the case management staff and medicine team. She has multiple family members affected by the covid 19, including her mother who is intubated in the ICU, her aunt recently passed and she has another uncle/ aunt intubated in a different hospital. She does not want her father to come home unless he is 100% back to normal. Extensive discussions with her in regards to the fact that her father has significantly improved and can have sx for over 14 days. She is not willing to have the father return until his covid is negative/ all sx resolve. She continues to state that she is going through a lot and lives with her 6 year old son and that she wants her father to go to [facility]. Ongoing discussions with CM/SW in regards to dispo planning. 79 year old male with hx of HTN, Afib on coumadin s/p PPM, Mechanical Mitral valve, chronic diastolic HF, CVA presented to ER generalized weakness, flu like sx x 3 days and s/p fall out of bed. Pan ct head/ c spine negative. Noted to be covid positive. Pt admitted with COVID 19. Empirically remained on hydroxychloroquin and vit c which was completed on 3/23. Improving sob/ cough. Hospital course complicated by multiple episodes of diarrhea likely related to covid, started on imodium, noted hyponatremia for which transient IVF started. Also with worsening left shoulder pain, left shoulder x ray negative. Also pt initially presented with supra therapeutic INR on coumadin, which down trended and currently is sub therapeutic, given mechanical valve, dosing coumadin aggressively.  PT consulted and recommended [facility], authorization in process, given covid positive and repeat pcr positive likely difficult dispo, in the interim daily PT until patient either meets goals to go home or obtains auth for rodolfo.     COVID 19 infection   - Improving sob, dry cough remains intermittently   - diarrhea with 2 episodes today and 3 episodes yest   - electrolytes stable but with hyponatremia likely from hypovolemia from GI losses   - c/w airborne and contact isolation precautions  - c/w IVF x 8 hours monitoring closely given hx HF   - c/w supportive treatment  -c/w  tylenol prn for fever  - c/w robitussin prn   - c/w tessalone pearls prn   -c/w imodium prn for diarrhea   - s/p hydroxychloroquine and vitamin c  ended on 3/23  - weaning off of supplemental o2 as tolerated  - pt does not use o2 at home   - f/u cxr in the am   - ID f/u noted and appreciated, d/w Dr. Hess and unclear as to when the PCR would turn negative for the pt could be 14 days if not longer   - In order to get into [facility] pt needed 2 covid pcr negative, pending from 3/25 and 3/26     Diarrhea 2/2 COVID 19  - pt still is having about 2-3 episodes a day  - not watery just soft  - d/w lab and tried to send for C. Diff but refused x 2 bc does not qualify, giving slowing down likely related to infection with covid  - c/w supportive care for losses     S/p fall 2/2 generalized weakness 2/2 hypovolemia and covid 19  - no injuries noted on exam but with left shoulder pain which persists likely arthritis related   - ct head/ c spine negative  - left shoulder x ray negative  - c/w tylenol prn for pain   - c/w lidocaine patch to left shoulder   - PT evaluation noted     OMAYRA on CKD 3  - resolved at baseline   - prerenal azotemia resolved   - Creatine back to baseline   - avoid nephrotoxic medications  - lisinopril held     Chronic Afib   -hx of mechanical valve -MVR and PPM   - rate controlled  - c/w dig and metoprolol  - INR: 2  - goal is 2.5-3.5  - will dose coumadin 10mg po qhs tonight   - f/u INR in the am  -cardio f/u noted and appreciated , D/w Dr. Capone no need to bridge for now unless INR below 2 then would have to bridge     Chronic diastolic CHF  - Appears euvolemic  - echo in 2015 showing ef 65%  - c/w metoprolol po bid  - holding lisinopril in the setting of covid  - c/w holding lasix until GI losses improve   - cardio f/u noted and appreciated     AAA  -Stable 4.7 cm infrarenal abdominal aortic aneurysm. Persistent occlusion of the abdominal aortic aneurysm, infrarenal aorta, and bilateral iliac arteries with distal reconstitution of the common femoral arteries.  - compared to prior ct and stable at this time   - Pt to f/u outpt with vascular sx     HTN  - bp stable   - monitor blood pressure  -c/w metoprolol  - holding lisinopril in the setting of covid     Hypothyroidism   - c/w levothyroxine po qd     Moderate Protein calorie malnutrition  - c/w ensure po tid  - nutrition consult noted and appreciated     DVT prophylaxis  - covered on coumadin   - scd boots b/l   Next of kin: Aida-daughter     Dispo: PT consulted and recommended [facility], pending rodolfo placement. D/w CM and [facility] wants 2 negative covid pcr prior to acceptance, both pcr came back positive thus far and pt has been in the hospital for 10 days and has had the infection for over 10 days.  In case rodolfo is not possible given the requirements to get in, pt will c/w daily PT to see if goals met prior to [facility] auth. Will re order covid in 2-3 days and see if pcr returns as negative at that time.     Of note the daughter Aida is very aggressive and has been disrespectful to the case management staff and medicine team. She has multiple family members affected by the covid 19, including her mother who is intubated in the ICU, her aunt recently passed and she has another uncle/ aunt intubated in a different hospital. She does not want her father to come home unless he is 100% back to normal. Extensive discussions with her in regards to the fact that her father has significantly improved and can have sx for over 14 days. She is not willing to have the father return until his covid is negative/ all sx resolve. She continues to state that she is going through a lot and lives with her 6 year old son and that she wants her father to go to [facility]. Ongoing discussions with CM/SW in regards to dispo planning. 79 year old male with hx of HTN, Afib on coumadin s/p PPM, Mechanical Mitral valve, chronic diastolic HF, CVA presented to ER generalized weakness, flu like sx x 3 days, continual diarrhea and s/p fall out of bed. Pan ct head/ c spine negative. Noted to be covid positive 3 days prior to hospitalization. Pt admitted with COVID 19. Empirically remained on hydroxychloroquin and vit c which was completed on 3/23. Improving sob/ cough. Hospital course complicated by multiple episodes of diarrhea likely related to covid, started on imodium, noted hyponatremia for which transient IVF started. Also with worsening left shoulder pain, left shoulder x ray negative. Also pt initially presented with supra therapeutic INR on coumadin, which down trended and currently is sub therapeutic, given mechanical valve, dosing coumadin aggressively.  PT consulted and recommended [facility], authorization in process, given covid positive and repeat pcr positive likely difficult dispo, in the interim daily PT until patient either meets goals to go home or obtains auth for rodolfo.     COVID 19 infection   - Improving sob, dry cough remains intermittently   - diarrhea with 2 episodes today and 3 episodes yest   - electrolytes stable but with hyponatremia likely from hypovolemia from GI losses   - c/w airborne and contact isolation precautions  - c/w IVF x 8 hours monitoring closely given hx HF   - c/w supportive treatment  -c/w  tylenol prn for fever  - c/w robitussin prn   - c/w tessalone pearls prn   -c/w imodium prn for diarrhea   - s/p hydroxychloroquine and vitamin c  ended on 3/23  - weaning off of supplemental o2 as tolerated  - pt does not use o2 at home   - f/u cxr in the am   - ID f/u noted and appreciated, d/w Dr. Hess and unclear as to when the PCR would turn negative for the pt could be 14 days if not longer   - In order to get into [facility] pt needed 2 covid pcr negative, pending from 3/25 and 3/26     Diarrhea 2/2 COVID 19  - pt still is having about 2-3 episodes a day  - not watery just soft  - d/w lab and tried to send for C. Diff but refused x 2 bc does not qualify, giving slowing down likely related to infection with covid  - c/w supportive care for losses     S/p fall 2/2 generalized weakness 2/2 hypovolemia and covid 19  - no injuries noted on exam but with left shoulder pain which persists likely arthritis related   - ct head/ c spine negative  - left shoulder x ray negative  - c/w tylenol prn for pain   - c/w lidocaine patch to left shoulder   - PT evaluation noted     OMAYRA on CKD 3  - resolved at baseline   - prerenal azotemia resolved   - Creatine back to baseline   - avoid nephrotoxic medications  - lisinopril held     Chronic Afib   -hx of mechanical valve -MVR and PPM   - rate controlled  - c/w dig and metoprolol  - INR: 2  - goal is 2.5-3.5  - will dose coumadin 10mg po qhs tonight   - f/u INR in the am  -cardio f/u noted and appreciated , D/w Dr. Capone no need to bridge for now unless INR below 2 then would have to bridge     Chronic diastolic CHF  - Appears euvolemic  - echo in 2015 showing ef 65%  - c/w metoprolol po bid  - holding lisinopril in the setting of covid  - c/w holding lasix until GI losses improve   - cardio f/u noted and appreciated     AAA  -Stable 4.7 cm infrarenal abdominal aortic aneurysm. Persistent occlusion of the abdominal aortic aneurysm, infrarenal aorta, and bilateral iliac arteries with distal reconstitution of the common femoral arteries.  - compared to prior ct and stable at this time   - Pt to f/u outpt with vascular sx     HTN  - bp stable   - monitor blood pressure  -c/w metoprolol  - holding lisinopril in the setting of covid     Hypothyroidism   - c/w levothyroxine po qd     Moderate Protein calorie malnutrition  - c/w ensure po tid  - nutrition consult noted and appreciated     DVT prophylaxis  - covered on coumadin   - scd boots b/l   Next of kin: Aida-daughter     Dispo: PT consulted and recommended [facility], pending rodolfo placement. D/w CM and [facility] wants 2 negative covid pcr prior to acceptance, both pcr came back positive thus far and pt has been in the hospital for 10 days and has had the infection for over 10 days.  In case rodolfo is not possible given the requirements to get in, pt will c/w daily PT to see if goals met prior to [facility] auth. Will re order covid in 2-3 days and see if pcr returns as negative at that time.     Of note the daughter Aida is very aggressive and has been disrespectful to the case management staff and medicine team. She has multiple family members affected by the covid 19, including her mother who is intubated in the ICU, her aunt recently passed and she has another uncle/ aunt intubated in a different hospital. She does not want her father to come home unless he is 100% back to normal. Extensive discussions with her in regards to the fact that her father has significantly improved and can have sx for over 14 days. She is not willing to have the father return until his covid is negative/ all sx resolve. She continues to state that she is going through a lot and lives with her 6 year old son and that she wants her father to go to [facility]. Ongoing discussions with CM/SW in regards to dispo planning. 79 year old male with hx of HTN, Afib on coumadin s/p PPM, Mechanical Mitral valve, chronic diastolic HF, CVA presented to ER generalized weakness, flu like sx x 3 days, continual diarrhea and s/p fall out of bed. Pan ct head/ c spine negative. Noted to be covid positive 3 days prior to hospitalization. Pt admitted with COVID 19. Empirically remained on hydroxychloroquin and vit c which was completed on 3/23. Improving sob/ cough. Hospital course complicated by multiple episodes of diarrhea likely related to covid, started on imodium, noted hyponatremia for which transient IVF started. Also with worsening left shoulder pain, left shoulder x ray negative. Also pt initially presented with supra therapeutic INR on coumadin, which down trended and currently is sub therapeutic, given mechanical valve, dosing coumadin aggressively.  PT consulted and recommended [facility], authorization in process, given covid positive and repeat pcr positive likely difficult dispo, in the interim daily PT until patient either meets goals to go home or obtains auth for rodolfo.     COVID 19 infection   - Improving sob, dry cough remains intermittently   - diarrhea with 2 episodes today and 3 episodes yest   - electrolytes stable but with hyponatremia likely from hypovolemia from GI losses   - c/w airborne and contact isolation precautions  - c/w IVF x 8 hours monitoring closely given hx HF   - c/w supportive treatment  -c/w  tylenol prn for fever  - c/w robitussin prn   - c/w tessalone pearls prn   -c/w imodium prn for diarrhea   - s/p hydroxychloroquine and vitamin c  ended on 3/23  - weaning off of supplemental o2 as tolerated  - pt does not use o2 at home   - f/u cxr in the am   - ID f/u noted and appreciated, d/w Dr. Hess and unclear as to when the PCR would turn negative for the pt could be 14 days if not longer   - In order to get into [facility] pt needed 2 covid pcr negative, 3/25 positive and pending from 3/26     Diarrhea 2/2 COVID 19  - pt still is having about 2-3 episodes a day  - not watery just soft  - d/w lab and tried to send for C. Diff but refused x 2 bc does not qualify, giving slowing down likely related to infection with covid  - c/w supportive care for losses     S/p fall 2/2 generalized weakness 2/2 hypovolemia and covid 19  - no injuries noted on exam but with left shoulder pain which persists likely arthritis related   - ct head/ c spine negative  - left shoulder x ray negative  - c/w tylenol prn for pain   - c/w lidocaine patch to left shoulder   - PT evaluation noted     OMAYRA on CKD 3  - resolved at baseline   - prerenal azotemia resolved   - Creatine back to baseline   - avoid nephrotoxic medications  - lisinopril held     Chronic Afib   -hx of mechanical valve -MVR and PPM   - rate controlled  - c/w dig and metoprolol  - INR: 2  - goal is 2.5-3.5  - will dose coumadin 7mg po qhs tonight   - f/u INR in the am  -cardio f/u noted and appreciated , D/w Dr. Capone no need to bridge for now unless INR below 2 then would have to bridge     Chronic diastolic CHF  - Appears euvolemic  - echo in 2015 showing ef 65%  - c/w metoprolol po bid  - holding lisinopril in the setting of covid  - c/w holding lasix until GI losses improve   - cardio f/u noted and appreciated     AAA  -Stable 4.7 cm infrarenal abdominal aortic aneurysm. Persistent occlusion of the abdominal aortic aneurysm, infrarenal aorta, and bilateral iliac arteries with distal reconstitution of the common femoral arteries.  - compared to prior ct and stable at this time   - Pt to f/u outpt with vascular sx     HTN  - bp stable   - monitor blood pressure  -c/w metoprolol  - holding lisinopril in the setting of covid     Hypothyroidism   - c/w levothyroxine po qd     Moderate Protein calorie malnutrition  - c/w ensure po tid  - nutrition consult noted and appreciated     DVT prophylaxis  - covered on coumadin   - scd boots b/l   Next of kin: Aida-daughter     Dispo: PT consulted and recommended [facility], pending rodolfo placement. D/w CM and [facility] wants 2 negative covid pcr prior to acceptance, both pcr came back positive thus far and pt has been in the hospital for 10 days and has had the infection for over 10 days.  In case rodolfo is not possible given the requirements to get in, pt will c/w daily PT to see if goals met prior to [facility] auth. Will re order covid in 2-3 days and see if pcr returns as negative at that time.

## 2020-03-26 NOTE — CHART NOTE - NSCHARTNOTEFT_GEN_A_CORE
Source: Patient [ ]  Family [ ]   other [ x]    Current Diet: Diet, DASH/TLC:   Sodium & Cholesterol Restricted  Supplement Feeding Modality:  Oral  Ensure Enlive Cans or Servings Per Day:  1       Frequency:  Three Times a day (03-23-20 @ 15:23)      Patient reports [ ] nausea  [ ] vomiting [ ] diarrhea [ ] constipation  [ ]chewing problems [ ] swallowing issues  [ ] other:     PO intake:  < 50% [ ]   50-75%  [ ]   %  [ x]  other :    Source for PO intake [ ] Patient [ ] family [x ] chart [ ] staff [ ] other    Current Weight:   (3/19) 184.3 lbs  No new weight  Noted with 1+ generalized edema    Pertinent Medications: MEDICATIONS  (STANDING):  ALBUTerol    90 MICROgram(s) HFA Inhaler 2 Puff(s) Inhalation every 6 hours  digoxin     Tablet 0.25 milliGRAM(s) Oral daily  levothyroxine 50 MICROGram(s) Oral daily  lidocaine   Patch 1 Patch Transdermal daily  metoprolol tartrate 25 milliGRAM(s) Oral every 12 hours  tiotropium 18 MICROgram(s) Capsule 1 Capsule(s) Inhalation daily    MEDICATIONS  (PRN):  acetaminophen   Tablet .. 650 milliGRAM(s) Oral every 6 hours PRN Temp greater or equal to 38C (100.4F), Mild Pain (1 - 3)    Pertinent Labs: N/A      Skin: Intact per documentation     Nutrition focused physical exam not conducted at this time- found signs of malnutrition [ ]absent [ ]present    Subcutaneous fat loss: [ ] Orbital fat pads region, [ ]Buccal fat region, [ ]Triceps region,  [ ]Ribs region    Muscle wasting: [ ]Temples region, [ ]Clavicle region, [ ]Shoulder region, [ ]Scapula region, [ ]Interosseous region,  [ ]thigh region, [ ]Calf region    Estimated Needs:   [x ] no change since previous assessment  [ ] recalculated:     Current Nutrition Diagnosis: Pt remains at high nutrition risk secondary to malnutrition (moderate, chronic) related to inability to meet sufficient energy requirements in setting of advanced age and now with altered GI function (+diarrhea in setting of COVID-19) as evidenced by meeting less then 75% est needs > 5 days, + fluid accumulation, gradual 53# wt loss. Per documentation, pt with good po intake. Last BM 3/24 per documentation.    Recommendations:   1) Continue dit as tolerated.  2) Continue Ensure Enlive TID to optimize po intake and provide an additional 350 kcal, 20g protein per serving.  3) Encouraged continued good po intake at all meals.      Monitoring and Evaluation:   [x ] PO intake [x ] Tolerance to diet prescription [X] Weights  [X] Follow up per protocol [X] Labs

## 2020-03-26 NOTE — PROGRESS NOTE ADULT - SUBJECTIVE AND OBJECTIVE BOX
Patient is a 79y old  Male who presents with a chief complaint of Fall (25 Mar 2020 15:30)  pending placement.       HEALTH ISSUES - PROBLEM Dx:  COVID 19   viral pneumonia   diarrhea  electrolyte abnormalities       INTERVAL HPI/OVERNIGHT EVENTS:  Patient seen and examined at bedside. No acute events overnight. Patient states he is again having diarrhea today, three episodes so far as per RN is soft not loose or watery. Left shoulder pain is improving.  D/w the patient will trial medications to stop diarrhea, likely coronavirus related. Patient denies chest pain, SOB, abd pain, N/V, fever, chills, dysuria or any other complaints. All remainder ROS negative.         Vital Signs Last 24 Hrs  T(C): 36.4 (26 Mar 2020 09:38), Max: 37.9 (25 Mar 2020 19:00)  T(F): 97.6 (26 Mar 2020 09:38), Max: 100.3 (25 Mar 2020 19:00)  HR: 84 (26 Mar 2020 09:38) (71 - 98)  BP: 140/70 (26 Mar 2020 09:38) (139/67 - 163/75)  BP(mean): --  RR: 18 (26 Mar 2020 09:38) (18 - 22)  SpO2: 95% (26 Mar 2020 09:38) (91% - 99%)      I&O's Summary    25 Mar 2020 07:01  -  26 Mar 2020 07:00  --------------------------------------------------------  IN: 0 mL / OUT: 200 mL / NET: -200 mL      CONSTITUTIONAL: Well appearing, well nourished, awake, alert and in no apparent distress  CARDIAC: Normal rate, regular rhythm.  Heart sounds S1, S2.  No murmurs, rubs or gallops   RESPIRATORY: Fair air entry   b/l rhonchi   GASTROENTEROLOGY : Soft nt nd bs+ normoactive   EXTREMITIES: No edema, cyanosis or deformity   NEUROLOGICAL: Alert and oriented, no focal deficits, no motor or sensory deficits.  SKIN: No rash, skin turgor wnl    MEDICATIONS  (STANDING):  ALBUTerol    90 MICROgram(s) HFA Inhaler 2 Puff(s) Inhalation every 6 hours  digoxin     Tablet 0.25 milliGRAM(s) Oral daily  levothyroxine 50 MICROGram(s) Oral daily  lidocaine   Patch 1 Patch Transdermal daily  metoprolol tartrate 25 milliGRAM(s) Oral every 12 hours  sodium chloride 0.9%. 1000 milliLiter(s) (60 mL/Hr) IV Continuous <Continuous>  tiotropium 18 MICROgram(s) Capsule 1 Capsule(s) Inhalation daily  warfarin 7 milliGRAM(s) Oral once    MEDICATIONS  (PRN):  acetaminophen   Tablet .. 650 milliGRAM(s) Oral every 6 hours PRN Temp greater or equal to 38C (100.4F), Mild Pain (1 - 3)  loperamide 2 milliGRAM(s) Oral three times a day PRN Diarrhea      LABS:    03-26    127<L>  |  92<L>  |  28.0<H>  ----------------------------<  100<H>  4.5   |  20.0<L>  |  1.14    Ca    9.1      26 Mar 2020 11:45      PT/INR - ( 26 Mar 2020 08:38 )   PT: 24.1 sec;   INR: 2.08 ratio         RADIOLOGY & ADDITIONAL TESTS:  No new imaging studies Patient is a 79y old  Male who presents with a chief complaint of Fall (25 Mar 2020 15:30)  pending placement.       HEALTH ISSUES - PROBLEM Dx:  COVID 19   viral pneumonia   diarrhea  electrolyte abnormalities       INTERVAL HPI/OVERNIGHT EVENTS:  Patient seen and examined at bedside. No acute events overnight. Patient states he is again having diarrhea today, three episodes so far as per RN is soft not loose or watery. Left shoulder pain is improving.  D/w the patient will trial medications to stop diarrhea, likely coronavirus related. Patient denies chest pain, SOB, abd pain, N/V, fever, chills, dysuria or any other complaints. All remainder ROS negative.         Vital Signs Last 24 Hrs  T(C): 36.4 (26 Mar 2020 09:38), Max: 37.9 (25 Mar 2020 19:00)  T(F): 97.6 (26 Mar 2020 09:38), Max: 100.3 (25 Mar 2020 19:00)  HR: 84 (26 Mar 2020 09:38) (71 - 98)  BP: 140/70 (26 Mar 2020 09:38) (139/67 - 163/75)  BP(mean): --  RR: 18 (26 Mar 2020 09:38) (18 - 22)  SpO2: 95% (26 Mar 2020 09:38) (91% - 99%)      I&O's Summary    25 Mar 2020 07:01  -  26 Mar 2020 07:00  --------------------------------------------------------  IN: 0 mL / OUT: 200 mL / NET: -200 mL      CONSTITUTIONAL: Well appearing, well nourished, awake, alert and in no apparent distress  CARDIAC: Normal rate, regular rhythm.  Heart sounds S1, S2.  No murmurs, rubs or gallops    L sided cardiac device   RESPIRATORY: Fair air entry  bibasilar crackles   GASTROENTEROLOGY : Soft nt nd bs+ normoactive   EXTREMITIES: No edema, cyanosis or deformity   SKIN: No rash, skin turgor wnl    MEDICATIONS  (STANDING):  ALBUTerol    90 MICROgram(s) HFA Inhaler 2 Puff(s) Inhalation every 6 hours  digoxin     Tablet 0.25 milliGRAM(s) Oral daily  levothyroxine 50 MICROGram(s) Oral daily  lidocaine   Patch 1 Patch Transdermal daily  metoprolol tartrate 25 milliGRAM(s) Oral every 12 hours  sodium chloride 0.9%. 1000 milliLiter(s) (60 mL/Hr) IV Continuous <Continuous>  tiotropium 18 MICROgram(s) Capsule 1 Capsule(s) Inhalation daily  warfarin 7 milliGRAM(s) Oral once    MEDICATIONS  (PRN):  acetaminophen   Tablet .. 650 milliGRAM(s) Oral every 6 hours PRN Temp greater or equal to 38C (100.4F), Mild Pain (1 - 3)  loperamide 2 milliGRAM(s) Oral three times a day PRN Diarrhea      LABS:    03-26    127<L>  |  92<L>  |  28.0<H>  ----------------------------<  100<H>  4.5   |  20.0<L>  |  1.14    Ca    9.1      26 Mar 2020 11:45      PT/INR - ( 26 Mar 2020 08:38 )   PT: 24.1 sec;   INR: 2.08 ratio         RADIOLOGY & ADDITIONAL TESTS:  No new imaging studies Patient is a 79y old  Male who presents with a chief complaint of Fall (25 Mar 2020 15:30)  pending placement.       HEALTH ISSUES - PROBLEM Dx:  COVID 19   viral pneumonia   diarrhea  electrolyte abnormalities       INTERVAL HPI/OVERNIGHT EVENTS:  Patient seen and examined at bedside. No acute events overnight. Patient states diarrhea improved today, so far as per RN is soft not loose or watery. Left shoulder pain is improving.  D/w the patient will trial medications to stop diarrhea, likely coronavirus related. Patient denies chest pain, SOB, abd pain, N/V, fever, chills, dysuria or any other complaints. All remainder ROS negative.         Vital Signs Last 24 Hrs  T(C): 36.4 (26 Mar 2020 09:38), Max: 37.9 (25 Mar 2020 19:00)  T(F): 97.6 (26 Mar 2020 09:38), Max: 100.3 (25 Mar 2020 19:00)  HR: 84 (26 Mar 2020 09:38) (71 - 98)  BP: 140/70 (26 Mar 2020 09:38) (139/67 - 163/75)  BP(mean): --  RR: 18 (26 Mar 2020 09:38) (18 - 22)  SpO2: 95% (26 Mar 2020 09:38) (91% - 99%)      I&O's Summary    25 Mar 2020 07:01  -  26 Mar 2020 07:00  --------------------------------------------------------  IN: 0 mL / OUT: 200 mL / NET: -200 mL      CONSTITUTIONAL: Well appearing, well nourished, awake, alert and in no apparent distress  CARDIAC: Normal rate, regular rhythm.  Heart sounds S1, S2.  No murmurs, rubs or gallops    L sided cardiac device   RESPIRATORY: Fair air entry  bibasilar crackles   GASTROENTEROLOGY : Soft nt nd bs+ normoactive   EXTREMITIES: No edema, cyanosis or deformity   SKIN: No rash, skin turgor wnl    MEDICATIONS  (STANDING):  ALBUTerol    90 MICROgram(s) HFA Inhaler 2 Puff(s) Inhalation every 6 hours  digoxin     Tablet 0.25 milliGRAM(s) Oral daily  levothyroxine 50 MICROGram(s) Oral daily  lidocaine   Patch 1 Patch Transdermal daily  metoprolol tartrate 25 milliGRAM(s) Oral every 12 hours  sodium chloride 0.9%. 1000 milliLiter(s) (60 mL/Hr) IV Continuous <Continuous>  tiotropium 18 MICROgram(s) Capsule 1 Capsule(s) Inhalation daily  warfarin 7 milliGRAM(s) Oral once    MEDICATIONS  (PRN):  acetaminophen   Tablet .. 650 milliGRAM(s) Oral every 6 hours PRN Temp greater or equal to 38C (100.4F), Mild Pain (1 - 3)  loperamide 2 milliGRAM(s) Oral three times a day PRN Diarrhea      LABS:    03-26    127<L>  |  92<L>  |  28.0<H>  ----------------------------<  100<H>  4.5   |  20.0<L>  |  1.14    Ca    9.1      26 Mar 2020 11:45      PT/INR - ( 26 Mar 2020 08:38 )   PT: 24.1 sec;   INR: 2.08 ratio         RADIOLOGY & ADDITIONAL TESTS:  No new imaging studies

## 2020-03-27 LAB
ANION GAP SERPL CALC-SCNC: 15 MMOL/L — SIGNIFICANT CHANGE UP (ref 5–17)
BASOPHILS # BLD AUTO: 0.05 K/UL — SIGNIFICANT CHANGE UP (ref 0–0.2)
BASOPHILS NFR BLD AUTO: 0.4 % — SIGNIFICANT CHANGE UP (ref 0–2)
BUN SERPL-MCNC: 36 MG/DL — HIGH (ref 8–20)
CALCIUM SERPL-MCNC: 8.9 MG/DL — SIGNIFICANT CHANGE UP (ref 8.6–10.2)
CHLORIDE SERPL-SCNC: 97 MMOL/L — LOW (ref 98–107)
CO2 SERPL-SCNC: 21 MMOL/L — LOW (ref 22–29)
CREAT SERPL-MCNC: 1.09 MG/DL — SIGNIFICANT CHANGE UP (ref 0.5–1.3)
EOSINOPHIL # BLD AUTO: 0.13 K/UL — SIGNIFICANT CHANGE UP (ref 0–0.5)
EOSINOPHIL NFR BLD AUTO: 1.1 % — SIGNIFICANT CHANGE UP (ref 0–6)
GLUCOSE SERPL-MCNC: 95 MG/DL — SIGNIFICANT CHANGE UP (ref 70–99)
HCT VFR BLD CALC: 35.3 % — LOW (ref 39–50)
HGB BLD-MCNC: 11.7 G/DL — LOW (ref 13–17)
IMM GRANULOCYTES NFR BLD AUTO: 4.9 % — HIGH (ref 0–1.5)
INR BLD: 2.08 RATIO — HIGH (ref 0.88–1.16)
LYMPHOCYTES # BLD AUTO: 1.2 K/UL — SIGNIFICANT CHANGE UP (ref 1–3.3)
LYMPHOCYTES # BLD AUTO: 10.2 % — LOW (ref 13–44)
MCHC RBC-ENTMCNC: 27.7 PG — SIGNIFICANT CHANGE UP (ref 27–34)
MCHC RBC-ENTMCNC: 33.1 GM/DL — SIGNIFICANT CHANGE UP (ref 32–36)
MCV RBC AUTO: 83.5 FL — SIGNIFICANT CHANGE UP (ref 80–100)
MONOCYTES # BLD AUTO: 0.85 K/UL — SIGNIFICANT CHANGE UP (ref 0–0.9)
MONOCYTES NFR BLD AUTO: 7.2 % — SIGNIFICANT CHANGE UP (ref 2–14)
NEUTROPHILS # BLD AUTO: 8.93 K/UL — HIGH (ref 1.8–7.4)
NEUTROPHILS NFR BLD AUTO: 76.2 % — SIGNIFICANT CHANGE UP (ref 43–77)
PLATELET # BLD AUTO: 383 K/UL — SIGNIFICANT CHANGE UP (ref 150–400)
POTASSIUM SERPL-MCNC: 4.5 MMOL/L — SIGNIFICANT CHANGE UP (ref 3.5–5.3)
POTASSIUM SERPL-SCNC: 4.5 MMOL/L — SIGNIFICANT CHANGE UP (ref 3.5–5.3)
PROTHROM AB SERPL-ACNC: 24.1 SEC — HIGH (ref 10–12.9)
RBC # BLD: 4.23 M/UL — SIGNIFICANT CHANGE UP (ref 4.2–5.8)
RBC # FLD: 15.5 % — HIGH (ref 10.3–14.5)
SARS-COV-2 RNA SPEC QL NAA+PROBE: DETECTED
SODIUM SERPL-SCNC: 133 MMOL/L — LOW (ref 135–145)
WBC # BLD: 11.74 K/UL — HIGH (ref 3.8–10.5)
WBC # FLD AUTO: 11.74 K/UL — HIGH (ref 3.8–10.5)

## 2020-03-27 PROCEDURE — 99232 SBSQ HOSP IP/OBS MODERATE 35: CPT

## 2020-03-27 RX ORDER — WARFARIN SODIUM 2.5 MG/1
10 TABLET ORAL ONCE
Refills: 0 | Status: COMPLETED | OUTPATIENT
Start: 2020-03-27 | End: 2020-03-27

## 2020-03-27 RX ADMIN — ALBUTEROL 2 PUFF(S): 90 AEROSOL, METERED ORAL at 03:54

## 2020-03-27 RX ADMIN — Medication 100 MILLIGRAM(S): at 14:01

## 2020-03-27 RX ADMIN — Medication 1000 UNIT(S): at 13:59

## 2020-03-27 RX ADMIN — Medication 50 MICROGRAM(S): at 05:09

## 2020-03-27 RX ADMIN — TIOTROPIUM BROMIDE 1 CAPSULE(S): 18 CAPSULE ORAL; RESPIRATORY (INHALATION) at 09:21

## 2020-03-27 RX ADMIN — ALBUTEROL 2 PUFF(S): 90 AEROSOL, METERED ORAL at 16:30

## 2020-03-27 RX ADMIN — ALBUTEROL 2 PUFF(S): 90 AEROSOL, METERED ORAL at 09:22

## 2020-03-27 RX ADMIN — Medication 25 MILLIGRAM(S): at 05:09

## 2020-03-27 RX ADMIN — LIDOCAINE 1 PATCH: 4 CREAM TOPICAL at 00:43

## 2020-03-27 RX ADMIN — ALBUTEROL 2 PUFF(S): 90 AEROSOL, METERED ORAL at 22:18

## 2020-03-27 RX ADMIN — LIDOCAINE 1 PATCH: 4 CREAM TOPICAL at 18:58

## 2020-03-27 RX ADMIN — Medication 650 MILLIGRAM(S): at 14:00

## 2020-03-27 RX ADMIN — LIDOCAINE 1 PATCH: 4 CREAM TOPICAL at 14:00

## 2020-03-27 RX ADMIN — WARFARIN SODIUM 10 MILLIGRAM(S): 2.5 TABLET ORAL at 22:09

## 2020-03-27 RX ADMIN — Medication 0.25 MILLIGRAM(S): at 05:09

## 2020-03-27 RX ADMIN — Medication 650 MILLIGRAM(S): at 16:58

## 2020-03-27 NOTE — PROGRESS NOTE ADULT - SUBJECTIVE AND OBJECTIVE BOX
Patient is a 79y old  Male who presents with a chief complaint of Fall (26 Mar 2020 13:29) pending dispo       HEALTH ISSUES - PROBLEM Dx:  COVID 19   viral pneumonia   diarrhea  electrolyte abnormalities     INTERVAL HPI/OVERNIGHT EVENTS:  Patient seen and examined at bedside. No acute events overnight. Patient states he is again having diarrhea today, two episodes so far as per RN is soft not loose or watery. Left shoulder pain is improving.  Patient denies chest pain, SOB, abd pain, N/V, fever, chills, dysuria or any other complaints. All remainder ROS negative.     Vital Signs Last 24 Hrs  T(C): 36.6 (28 Mar 2020 09:51), Max: 37 (28 Mar 2020 00:00)  T(F): 97.8 (28 Mar 2020 09:51), Max: 98.6 (28 Mar 2020 00:00)  HR: 68 (28 Mar 2020 09:51) (68 - 91)  BP: 136/62 (28 Mar 2020 09:51) (118/79 - 149/64)  BP(mean): --  RR: 21 (28 Mar 2020 09:51) (20 - 22)  SpO2: 93% (28 Mar 2020 09:51) (93% - 96%)      CONSTITUTIONAL: Well appearing, well nourished, awake, alert and in no apparent distress  CARDIAC: Normal rate, regular rhythm.  Heart sounds S1, S2.  No murmurs, rubs or gallops    L sided cardiac device   RESPIRATORY: Fair air entry  bibasilar crackles   GASTROENTEROLOGY : Soft nt nd bs+ normoactive   EXTREMITIES: No edema, cyanosis or deformity   SKIN: No rash, skin turgor wnl    MEDICATIONS  (STANDING):  ALBUTerol    90 MICROgram(s) HFA Inhaler 2 Puff(s) Inhalation every 6 hours  cholecalciferol 1000 Unit(s) Oral daily  digoxin     Tablet 0.25 milliGRAM(s) Oral daily  levothyroxine 50 MICROGram(s) Oral daily  lidocaine   Patch 1 Patch Transdermal daily  metoprolol tartrate 25 milliGRAM(s) Oral every 12 hours  thiamine 100 milliGRAM(s) Oral daily  tiotropium 18 MICROgram(s) Capsule 1 Capsule(s) Inhalation daily    MEDICATIONS  (PRN):  acetaminophen   Tablet .. 650 milliGRAM(s) Oral every 6 hours PRN Temp greater or equal to 38C (100.4F), Mild Pain (1 - 3)  loperamide 2 milliGRAM(s) Oral three times a day PRN Diarrhea      LABS:                        11.9   9.50  )-----------( 407      ( 28 Mar 2020 11:10 )             37.0     03-28    137  |  99  |  31.0<H>  ----------------------------<  119<H>  4.7   |  25.0  |  0.84    Ca    9.3      28 Mar 2020 11:10      PT/INR - ( 28 Mar 2020 11:10 )   PT: 37.1 sec;   INR: 3.17 ratio         RADIOLOGY & ADDITIONAL TESTS:  No new imaging studies

## 2020-03-27 NOTE — PROGRESS NOTE ADULT - ASSESSMENT
79 year old male with hx of HTN, Afib on coumadin s/p PPM, Mechanical Mitral valve, chronic diastolic HF, CVA presented to ER generalized weakness, flu like sx x 3 days, continual diarrhea and s/p fall out of bed. Pan ct head/ c spine negative. Noted to be covid positive 3 days prior to hospitalization. Pt admitted with COVID 19. Empirically remained on hydroxychloroquin and vit c which was completed on 3/23. Improving sob/ cough. Hospital course complicated by multiple episodes of diarrhea likely related to covid, started on imodium, noted hyponatremia for which transient IVF started. Also with worsening left shoulder pain, left shoulder x ray negative. Also pt initially presented with supra therapeutic INR on coumadin, which down trended and currently is sub therapeutic, given mechanical valve, dosing coumadin aggressively.  PT consulted and recommended [facility], authorization in process, given covid positive and repeat pcr positive likely difficult dispo, in the interim daily PT until patient either meets goals to go home or obtains auth for [facility].     COVID 19 infection   - Improving sob, dry cough remains intermittently   - diarrhea with 2 episodes today    - electrolytes stable but with hyponatremia likely from hypovolemia from GI losses   - c/w airborne and contact isolation precautions  - c/w IVF x 8 hours monitoring closely given hx HF   - c/w supportive treatment  -c/w  tylenol prn for fever  - c/w robitussin prn   - c/w tessalone pearls prn   -c/w imodium prn for diarrhea   - s/p hydroxychloroquine and vitamin c  ended on 3/23  - weaning off of supplemental o2 as tolerated  - pt does not use o2 at home   - f/u cxr in the am   - ID f/u noted and appreciated, d/w Dr. Hess and unclear as to when the PCR would turn negative for the pt could be 14 days if not longer   - In order to get into [facility] pt needed 2 covid pcr negative,  3/25 and 3/26 remain positive will hold off on ordering repeat until maybe sunday/monday to see if it turns negative     Diarrhea 2/2 COVID 19  - pt still is having about 2-3 episodes a day  - not watery just soft  - d/w lab and tried to send for C. Diff but refused x 2 bc does not qualify, giving slowing down likely related to infection with covid  - c/w supportive care for losses     S/p fall 2/2 generalized weakness 2/2 hypovolemia and covid 19  - no injuries noted on exam but with left shoulder pain which persists likely arthritis related   - ct head/ c spine negative  - left shoulder x ray negative  - c/w tylenol prn for pain   - c/w lidocaine patch to left shoulder   - PT evaluation noted     OMAYRA on CKD 3  - resolved at baseline   - prerenal azotemia resolved   - Creatine back to baseline   - avoid nephrotoxic medications  - lisinopril held     Chronic Afib   -hx of mechanical valve -MVR and PPM   - rate controlled  - c/w dig and metoprolol  - INR: 2  - goal is 2.5-3.5  - will dose coumadin 10mg po qhs tonight   - f/u INR in the am  -cardio f/u noted and appreciated , D/w Dr. Capone no need to bridge for now unless INR below 2 then would have to bridge     Chronic diastolic CHF  - Appears euvolemic  - echo in 2015 showing ef 65%  - c/w metoprolol po bid  - holding lisinopril in the setting of covid  - c/w holding lasix until GI losses improve   - cardio f/u noted and appreciated     AAA  -Stable 4.7 cm infrarenal abdominal aortic aneurysm. Persistent occlusion of the abdominal aortic aneurysm, infrarenal aorta, and bilateral iliac arteries with distal reconstitution of the common femoral arteries.  - compared to prior ct and stable at this time   - Pt to f/u outpt with vascular sx     HTN  - bp stable   - monitor blood pressure  -c/w metoprolol  - holding lisinopril in the setting of covid     Hypothyroidism   - c/w levothyroxine po qd     Moderate Protein calorie malnutrition  - c/w ensure po tid  - nutrition consult noted and appreciated     DVT prophylaxis  - covered on coumadin   - scd boots b/l   Next of kin: Aida-daughter     Dispo: PT consulted and recommended [facility], pending rod[facility]fo placement. D/w CM and [facility] wants 2 negative covid pcr prior to acceptance, both pcr came back positive thus far and pt has been in the hospital for 10 days and has had the infection for over 10 days.  In case rodolfo is not possible given the requirements to get in, pt will c/w daily PT to see if goals met prior to [facility] auth. Will re order covid in 2-3 days and see if pcr returns as negative at that time.     Of note the daughter Aida is very aggressive and has been disrespectful to the case management staff and medicine team. She has multiple family members affected by the covid 19, including her mother who is intubated in the ICU, her aunt recently passed and she has another uncle/ aunt intubated in a different hospital. She does not want her father to come home unless he is 100% back to normal. Extensive discussions with her in regards to the fact that her father has significantly improved and can have sx for over 14 days. She is not willing to have the father return until his covid is negative/ all sx resolve. She continues to state that she is going through a lot and lives with her 6 year old son and that she wants her father to go to [facility]. Ongoing discussions with CM/SW in regards to dispo planning.

## 2020-03-28 LAB
ANION GAP SERPL CALC-SCNC: 13 MMOL/L — SIGNIFICANT CHANGE UP (ref 5–17)
BUN SERPL-MCNC: 31 MG/DL — HIGH (ref 8–20)
CALCIUM SERPL-MCNC: 9.3 MG/DL — SIGNIFICANT CHANGE UP (ref 8.6–10.2)
CHLORIDE SERPL-SCNC: 99 MMOL/L — SIGNIFICANT CHANGE UP (ref 98–107)
CO2 SERPL-SCNC: 25 MMOL/L — SIGNIFICANT CHANGE UP (ref 22–29)
CREAT SERPL-MCNC: 0.84 MG/DL — SIGNIFICANT CHANGE UP (ref 0.5–1.3)
DIGOXIN SERPL-MCNC: 1 NG/ML — SIGNIFICANT CHANGE UP (ref 0.8–2)
GLUCOSE SERPL-MCNC: 119 MG/DL — HIGH (ref 70–99)
HCT VFR BLD CALC: 37 % — LOW (ref 39–50)
HGB BLD-MCNC: 11.9 G/DL — LOW (ref 13–17)
INR BLD: 3.17 RATIO — HIGH (ref 0.88–1.16)
MCHC RBC-ENTMCNC: 27.4 PG — SIGNIFICANT CHANGE UP (ref 27–34)
MCHC RBC-ENTMCNC: 32.2 GM/DL — SIGNIFICANT CHANGE UP (ref 32–36)
MCV RBC AUTO: 85.1 FL — SIGNIFICANT CHANGE UP (ref 80–100)
PLATELET # BLD AUTO: 407 K/UL — HIGH (ref 150–400)
POTASSIUM SERPL-MCNC: 4.7 MMOL/L — SIGNIFICANT CHANGE UP (ref 3.5–5.3)
POTASSIUM SERPL-SCNC: 4.7 MMOL/L — SIGNIFICANT CHANGE UP (ref 3.5–5.3)
PROTHROM AB SERPL-ACNC: 37.1 SEC — HIGH (ref 10–12.9)
RBC # BLD: 4.35 M/UL — SIGNIFICANT CHANGE UP (ref 4.2–5.8)
RBC # FLD: 15.7 % — HIGH (ref 10.3–14.5)
SODIUM SERPL-SCNC: 137 MMOL/L — SIGNIFICANT CHANGE UP (ref 135–145)
WBC # BLD: 9.5 K/UL — SIGNIFICANT CHANGE UP (ref 3.8–10.5)
WBC # FLD AUTO: 9.5 K/UL — SIGNIFICANT CHANGE UP (ref 3.8–10.5)

## 2020-03-28 PROCEDURE — 71045 X-RAY EXAM CHEST 1 VIEW: CPT | Mod: 26

## 2020-03-28 PROCEDURE — 99233 SBSQ HOSP IP/OBS HIGH 50: CPT

## 2020-03-28 RX ORDER — WARFARIN SODIUM 2.5 MG/1
7.5 TABLET ORAL ONCE
Refills: 0 | Status: COMPLETED | OUTPATIENT
Start: 2020-03-28 | End: 2020-03-28

## 2020-03-28 RX ADMIN — LIDOCAINE 1 PATCH: 4 CREAM TOPICAL at 13:57

## 2020-03-28 RX ADMIN — Medication 1000 UNIT(S): at 23:00

## 2020-03-28 RX ADMIN — Medication 100 MILLIGRAM(S): at 13:57

## 2020-03-28 RX ADMIN — Medication 25 MILLIGRAM(S): at 06:02

## 2020-03-28 RX ADMIN — ALBUTEROL 2 PUFF(S): 90 AEROSOL, METERED ORAL at 03:55

## 2020-03-28 RX ADMIN — Medication 25 MILLIGRAM(S): at 18:53

## 2020-03-28 RX ADMIN — Medication 650 MILLIGRAM(S): at 13:59

## 2020-03-28 RX ADMIN — LIDOCAINE 1 PATCH: 4 CREAM TOPICAL at 02:57

## 2020-03-28 RX ADMIN — Medication 50 MICROGRAM(S): at 06:02

## 2020-03-28 RX ADMIN — Medication 0.25 MILLIGRAM(S): at 06:02

## 2020-03-28 RX ADMIN — LIDOCAINE 1 PATCH: 4 CREAM TOPICAL at 20:11

## 2020-03-28 RX ADMIN — Medication 650 MILLIGRAM(S): at 14:30

## 2020-03-28 RX ADMIN — WARFARIN SODIUM 7.5 MILLIGRAM(S): 2.5 TABLET ORAL at 23:00

## 2020-03-28 NOTE — PROGRESS NOTE ADULT - SUBJECTIVE AND OBJECTIVE BOX
Patient is a 79y old  Male who presents with covid , fall       INTERVAL HPI/OVERNIGHT EVENTS:  Patient seen and examined at bedside. comfortable. in no acute distress. no fever or chills. no n/v/abd pain     Vital Signs Last 24 Hrs  T(C): 36.6 (28 Mar 2020 09:51), Max: 37 (28 Mar 2020 00:00)  T(F): 97.8 (28 Mar 2020 09:51), Max: 98.6 (28 Mar 2020 00:00)  HR: 68 (28 Mar 2020 09:51) (68 - 91)  BP: 136/62 (28 Mar 2020 09:51) (118/79 - 149/64)  BP(mean): --  RR: 21 (28 Mar 2020 09:51) (20 - 22)  SpO2: 93% (28 Mar 2020 09:51) (93% - 96%)    CONSTITUTIONAL: Well appearing, well nourished, awake, alert and in no apparent distress  CARDIAC: regular rhythm.  Heart sounds S1, S2.  No murmurs,   RESPIRATORY: Fair air entry  bibasilar rhonchi   GASTROENTEROLOGY : Soft nt nd bs+ normoactive   EXTREMITIES: No edema, cyanosis or deformity   SKIN: No rash, skin turgor wnl        LABS:                        11.9   9.50  )-----------( 407      ( 28 Mar 2020 11:10 )             37.0     03-28    137  |  99  |  31.0<H>  ----------------------------<  119<H>  4.7   |  25.0  |  0.84    Ca    9.3      28 Mar 2020 11:10      PT/INR - ( 28 Mar 2020 11:10 )   PT: 37.1 sec;   INR: 3.17 ratio

## 2020-03-28 NOTE — PROGRESS NOTE ADULT - ASSESSMENT
79 year old male with hx of HTN, Afib on coumadin s/p PPM, Mechanical Mitral valve, chronic diastolic HF, CVA presented to ER generalized weakness, flu like sx x 3 days, continual diarrhea and s/p fall out of bed. Pan ct head/ c spine negative. Noted to be covid positive 3 days prior to hospitalization. Pt admitted with COVID 19. Empirically remained on hydroxychloroquin and vit c which was completed on 3/23. Improving sob/ cough. Hospital course complicated by multiple episodes of diarrhea likely related to covid, started on imodium, noted hyponatremia for which transient IVF started. Also with worsening left shoulder pain, left shoulder x ray negative. Also pt initially presented with supra therapeutic INR on coumadin, which down trended and currently is sub therapeutic, given mechanical valve, dosing coumadin aggressively.  PT consulted and recommended [facility], authorization in process, given covid positive and repeat pcr positive likely difficult dispo, in the interim daily PT until patient either meets goals to go home or obtains auth for rodolfo.     COVID 19 infection   - Improving sob, dry cough remains intermittently   - c/w airborne and contact isolation precautions  - c/w IVF x 8 hours monitoring closely given hx HF   - c/w supportive treatment  tylenol prn for fever  - s/p hydroxychloroquine and vitamin c  ended on 3/23  - weaning off of supplemental o2 as tolerated  - pt does not use o2 at home   - In order to get into [facility] pt needed 2 covid pcr negative,  3/25 and 3/26 remain positive will hold off on ordering repeat until maybe sunday/monday to see if it turns negative     Diarrhea 2/2 COVID 19/ improved   - not watery just soft  -monitor   - c/w supportive care for losses     S/p fall 2/2 generalized weakness 2/2 hypovolemia and covid 19  - no injuries noted on exam but with left shoulder pain which persists likely arthritis related   - ct head/ c spine negative  - left shoulder x ray negative  - PT evaluation noted     OMAYRA on CKD 3  - resolved at baseline   - prerenal azotemia resolved   - Creatine back to baseline   - avoid nephrotoxic medications  - lisinopril held     Chronic Afib   -hx of mechanical valve -MVR and PPM   - rate controlled  - c/w dig and metoprolol  - goal is 2.5-3.5  - will dose coumadin 10mg po qhs tonight   -cardio f/u noted and appreciated , D/w Dr. Capone no need to bridge for now unless INR below 2 then would have to bridge     Chronic diastolic CHF  - Appears euvolemic  - echo in 2015 showing ef 65%  - c/w metoprolol po bid  - holding lisinopril in the setting of covid  - c/w holding lasix until GI losses improve   - cardio f/u noted and appreciated     AAA  -Stable 4.7 cm infrarenal abdominal aortic aneurysm. Persistent occlusion of the abdominal aortic aneurysm, infrarenal aorta, and bilateral iliac arteries with distal reconstitution of the common femoral arteries.  - compared to prior ct and stable at this time   - Pt to f/u outpt with vascular sx     HTN  - bp stable   - monitor blood pressure  -c/w metoprolol  - holding lisinopril in the setting of covid     Hypothyroidism   - c/w levothyroxine po qd     Moderate Protein calorie malnutrition  - c/w ensure po tid  - nutrition consult noted and appreciated     DVT prophylaxis  - covered on coumadin   - scd boots b/l   Next of kin: Aida-daughter     dispo :  Ongoing discussions with CM/SW in regards to dispo planning. needs x 2 negative covid tests for rodolfo placement

## 2020-03-29 LAB
ALBUMIN SERPL ELPH-MCNC: 2.8 G/DL — LOW (ref 3.3–5.2)
ALP SERPL-CCNC: 89 U/L — SIGNIFICANT CHANGE UP (ref 40–120)
ALT FLD-CCNC: 73 U/L — HIGH
ANION GAP SERPL CALC-SCNC: 11 MMOL/L — SIGNIFICANT CHANGE UP (ref 5–17)
AST SERPL-CCNC: 68 U/L — HIGH
BILIRUB SERPL-MCNC: 0.7 MG/DL — SIGNIFICANT CHANGE UP (ref 0.4–2)
BUN SERPL-MCNC: 27 MG/DL — HIGH (ref 8–20)
CALCIUM SERPL-MCNC: 9.7 MG/DL — SIGNIFICANT CHANGE UP (ref 8.6–10.2)
CHLORIDE SERPL-SCNC: 94 MMOL/L — LOW (ref 98–107)
CO2 SERPL-SCNC: 28 MMOL/L — SIGNIFICANT CHANGE UP (ref 22–29)
CREAT SERPL-MCNC: 0.92 MG/DL — SIGNIFICANT CHANGE UP (ref 0.5–1.3)
CRP SERPL-MCNC: 7.89 MG/DL — HIGH (ref 0–0.4)
ERYTHROCYTE [SEDIMENTATION RATE] IN BLOOD: 50 MM/HR — HIGH (ref 0–20)
GLUCOSE SERPL-MCNC: 92 MG/DL — SIGNIFICANT CHANGE UP (ref 70–99)
HCT VFR BLD CALC: 35.9 % — LOW (ref 39–50)
HGB BLD-MCNC: 11.4 G/DL — LOW (ref 13–17)
INR BLD: 4.44 RATIO — HIGH (ref 0.88–1.16)
LDH SERPL L TO P-CCNC: 341 U/L — HIGH (ref 98–192)
MCHC RBC-ENTMCNC: 27.5 PG — SIGNIFICANT CHANGE UP (ref 27–34)
MCHC RBC-ENTMCNC: 31.8 GM/DL — LOW (ref 32–36)
MCV RBC AUTO: 86.5 FL — SIGNIFICANT CHANGE UP (ref 80–100)
PLATELET # BLD AUTO: 433 K/UL — HIGH (ref 150–400)
POTASSIUM SERPL-MCNC: 4.6 MMOL/L — SIGNIFICANT CHANGE UP (ref 3.5–5.3)
POTASSIUM SERPL-SCNC: 4.6 MMOL/L — SIGNIFICANT CHANGE UP (ref 3.5–5.3)
PROT SERPL-MCNC: 7.1 G/DL — SIGNIFICANT CHANGE UP (ref 6.6–8.7)
PROTHROM AB SERPL-ACNC: 52.5 SEC — HIGH (ref 10–12.9)
RBC # BLD: 4.15 M/UL — LOW (ref 4.2–5.8)
RBC # FLD: 15.4 % — HIGH (ref 10.3–14.5)
SODIUM SERPL-SCNC: 133 MMOL/L — LOW (ref 135–145)
WBC # BLD: 10.07 K/UL — SIGNIFICANT CHANGE UP (ref 3.8–10.5)
WBC # FLD AUTO: 10.07 K/UL — SIGNIFICANT CHANGE UP (ref 3.8–10.5)

## 2020-03-29 PROCEDURE — 99232 SBSQ HOSP IP/OBS MODERATE 35: CPT

## 2020-03-29 RX ADMIN — LIDOCAINE 1 PATCH: 4 CREAM TOPICAL at 12:01

## 2020-03-29 RX ADMIN — Medication 25 MILLIGRAM(S): at 06:34

## 2020-03-29 RX ADMIN — Medication 100 MILLIGRAM(S): at 12:02

## 2020-03-29 RX ADMIN — Medication 1000 UNIT(S): at 12:04

## 2020-03-29 RX ADMIN — LIDOCAINE 1 PATCH: 4 CREAM TOPICAL at 02:42

## 2020-03-29 RX ADMIN — Medication 50 MICROGRAM(S): at 06:34

## 2020-03-29 RX ADMIN — Medication 0.25 MILLIGRAM(S): at 06:34

## 2020-03-29 RX ADMIN — Medication 25 MILLIGRAM(S): at 18:19

## 2020-03-29 NOTE — PROGRESS NOTE ADULT - SUBJECTIVE AND OBJECTIVE BOX
Patient is a 79y old  Male who presents with covid , fall       INTERVAL HPI/OVERNIGHT EVENTS:  Patient seen and examined at bedside. [email or web address] interpreters phone lien used. comfortable. in no acute distress. no fever or chills. no n/v/abd pain. son inlaw on phone     Vital Signs Last 24 Hrs  T(C): 36.7 (29 Mar 2020 08:00), Max: 36.7 (28 Mar 2020 23:07)  T(F): 98.1 (29 Mar 2020 08:00), Max: 98.1 (29 Mar 2020 08:00)  HR: 80 (29 Mar 2020 08:00) (70 - 84)  BP: 143/56 (29 Mar 2020 08:00) (133/67 - 158/66)  BP(mean): --  RR: 21 (29 Mar 2020 08:00) (20 - 21)  SpO2: 95% (29 Mar 2020 08:00) (95% - 96%)      CONSTITUTIONAL: Well appearing, well nourished, awake, alert and in no apparent distress  CARDIAC: regular rhythm.  Heart sounds S1, S2.  No murmurs,   RESPIRATORY: Fair air entry  bibasilar rhonchi   GASTROENTEROLOGY : Soft nt nd bs+ normoactive   EXTREMITIES: No edema, cyanosis or deformity   SKIN: No rash, skin turgor wnl                            11.4   10.07 )-----------( 433      ( 29 Mar 2020 12:13 )             35.9   03-29    133<L>  |  94<L>  |  27.0<H>  ----------------------------<  92  4.6   |  28.0  |  0.92    Ca    9.7      29 Mar 2020 12:13    TPro  7.1  /  Alb  2.8<L>  /  TBili  0.7  /  DBili  x   /  AST  68<H>  /  ALT  73<H>  /  AlkPhos  89  03-29    PT/INR - ( 29 Mar 2020 12:13 )   PT: 52.5 sec;   INR: 4.44 ratio

## 2020-03-29 NOTE — PROGRESS NOTE ADULT - ASSESSMENT
79 year old male with hx of HTN, Afib on coumadin s/p PPM, Mechanical Mitral valve, chronic diastolic HF, CVA presented to ER generalized weakness, flu like sx x 3 days, continual diarrhea and s/p fall out of bed. Pan ct head/ c spine negative. Noted to be covid positive 3 days prior to hospitalization. Pt admitted with COVID 19. Empirically remained on hydroxychloroquin and vit c which was completed on 3/23. Improving sob/ cough. Hospital course complicated by multiple episodes of diarrhea likely related to covid, started on imodium, noted hyponatremia for which transient IVF started. Also with worsening left shoulder pain, left shoulder x ray negative. Also pt initially presented with supra therapeutic INR on coumadin, which down trended and currently is sub therapeutic, given mechanical valve, dosing coumadin aggressively.  PT consulted and recommended RODOLFO, authorization in process, given covid positive and repeat pcr positive likely difficult dispo, in the interim daily PT until patient either meets goals to go home or obtains auth for rodolfo.     COVID 19 infection   - Improving sob, dry cough remains intermittently   - c/w airborne and contact isolation precautions  - c/w IVF x 8 hours monitoring closely given hx HF   - c/w supportive treatment  tylenol prn for fever  - s/p hydroxychloroquine and vitamin c  ended on 3/23  - weaning off of supplemental o2 as tolerated  - pt does not use o2 at home   - In order to get into [facility] pt needed 2 covid pcr negative,  3/25 and 3/26 remain positive will hold off on ordering repeat until maybe sunday/monday to see if it turns negative     Diarrhea 2/2 COVID 19/ improved   - not watery just soft  -monitor   - c/w supportive care for losses     S/p fall 2/2 generalized weakness 2/2 hypovolemia and covid 19  - no injuries noted on exam but with left shoulder pain which persists likely arthritis related   - ct head/ c spine negative  - left shoulder x ray negative  - PT evaluation noted     OMAYRA on CKD 3  - resolved at baseline   - prerenal azotemia resolved   - Creatine back to baseline   - avoid nephrotoxic medications  - lisinopril held     Chronic Afib   -hx of mechanical valve -MVR and PPM   - rate controlled  - c/w dig and metoprolol  - goal is 2.5-3.5  -will hold comadin tonight as inr 4.4   -cardio f/u noted and appreciated , D/w Dr. Capone no need to bridge for now unless INR below 2 then would have to bridge     Chronic diastolic CHF  - Appears euvolemic  - echo in 2015 showing ef 65%  - c/w metoprolol po bid  - holding lisinopril in the setting of covid  - c/w holding lasix until GI losses improve   - cardio f/u noted and appreciated     AAA  -Stable 4.7 cm infrarenal abdominal aortic aneurysm. Persistent occlusion of the abdominal aortic aneurysm, infrarenal aorta, and bilateral iliac arteries with distal reconstitution of the common femoral arteries.  - compared to prior ct and stable at this time   - Pt to f/u outpt with vascular sx     HTN  - bp stable   - monitor blood pressure  -c/w metoprolol  - holding lisinopril in the setting of covid     Hypothyroidism   - c/w levothyroxine po qd     Moderate Protein calorie malnutrition  - c/w ensure po tid  - nutrition consult noted and appreciated     DVT prophylaxis  - covered on coumadin   - scd boots b/l   Next of kin: Aida-daughter     dispo :  Ongoing discussions with CM/SW in regards to dispo planning. needs x 2 negative covid tests for rodolfo placement . will repeat test today

## 2020-03-30 LAB
ALBUMIN SERPL ELPH-MCNC: 2.9 G/DL — LOW (ref 3.3–5.2)
ALP SERPL-CCNC: 100 U/L — SIGNIFICANT CHANGE UP (ref 40–120)
ALT FLD-CCNC: 68 U/L — HIGH
ANION GAP SERPL CALC-SCNC: 14 MMOL/L — SIGNIFICANT CHANGE UP (ref 5–17)
AST SERPL-CCNC: 60 U/L — HIGH
BILIRUB SERPL-MCNC: 0.9 MG/DL — SIGNIFICANT CHANGE UP (ref 0.4–2)
BUN SERPL-MCNC: 28 MG/DL — HIGH (ref 8–20)
CALCIUM SERPL-MCNC: 9.8 MG/DL — SIGNIFICANT CHANGE UP (ref 8.6–10.2)
CHLORIDE SERPL-SCNC: 93 MMOL/L — LOW (ref 98–107)
CO2 SERPL-SCNC: 27 MMOL/L — SIGNIFICANT CHANGE UP (ref 22–29)
CREAT SERPL-MCNC: 0.89 MG/DL — SIGNIFICANT CHANGE UP (ref 0.5–1.3)
GLUCOSE SERPL-MCNC: 83 MG/DL — SIGNIFICANT CHANGE UP (ref 70–99)
HCT VFR BLD CALC: 38.9 % — LOW (ref 39–50)
HGB BLD-MCNC: 12.4 G/DL — LOW (ref 13–17)
INR BLD: 3.8 RATIO — HIGH (ref 0.88–1.16)
MCHC RBC-ENTMCNC: 27.4 PG — SIGNIFICANT CHANGE UP (ref 27–34)
MCHC RBC-ENTMCNC: 31.9 GM/DL — LOW (ref 32–36)
MCV RBC AUTO: 86.1 FL — SIGNIFICANT CHANGE UP (ref 80–100)
PLATELET # BLD AUTO: 469 K/UL — HIGH (ref 150–400)
POTASSIUM SERPL-MCNC: 4.9 MMOL/L — SIGNIFICANT CHANGE UP (ref 3.5–5.3)
POTASSIUM SERPL-SCNC: 4.9 MMOL/L — SIGNIFICANT CHANGE UP (ref 3.5–5.3)
PROT SERPL-MCNC: 7.6 G/DL — SIGNIFICANT CHANGE UP (ref 6.6–8.7)
PROTHROM AB SERPL-ACNC: 44.7 SEC — HIGH (ref 10–12.9)
RBC # BLD: 4.52 M/UL — SIGNIFICANT CHANGE UP (ref 4.2–5.8)
RBC # FLD: 15.3 % — HIGH (ref 10.3–14.5)
SARS-COV-2 RNA SPEC QL NAA+PROBE: DETECTED
SODIUM SERPL-SCNC: 134 MMOL/L — LOW (ref 135–145)
WBC # BLD: 10.81 K/UL — HIGH (ref 3.8–10.5)
WBC # FLD AUTO: 10.81 K/UL — HIGH (ref 3.8–10.5)

## 2020-03-30 PROCEDURE — 99232 SBSQ HOSP IP/OBS MODERATE 35: CPT

## 2020-03-30 RX ADMIN — Medication 1000 UNIT(S): at 11:33

## 2020-03-30 RX ADMIN — Medication 25 MILLIGRAM(S): at 17:46

## 2020-03-30 RX ADMIN — Medication 25 MILLIGRAM(S): at 06:01

## 2020-03-30 RX ADMIN — LIDOCAINE 1 PATCH: 4 CREAM TOPICAL at 11:33

## 2020-03-30 RX ADMIN — Medication 100 MILLIGRAM(S): at 11:33

## 2020-03-30 RX ADMIN — LIDOCAINE 1 PATCH: 4 CREAM TOPICAL at 00:15

## 2020-03-30 RX ADMIN — ALBUTEROL 2 PUFF(S): 90 AEROSOL, METERED ORAL at 22:43

## 2020-03-30 RX ADMIN — Medication 50 MICROGRAM(S): at 06:01

## 2020-03-30 RX ADMIN — Medication 0.25 MILLIGRAM(S): at 06:01

## 2020-03-30 NOTE — PROGRESS NOTE ADULT - ASSESSMENT
79 year old male with hx of HTN, Afib on coumadin s/p PPM, Mechanical Mitral valve, chronic diastolic HF, CVA presented to ER generalized weakness, flu like sx x 3 days, continual diarrhea and s/p fall out of bed. Pan ct head/ c spine negative. Noted to be covid positive 3 days prior to hospitalization. Pt admitted with COVID 19. Empirically remained on hydroxychloroquin and vit c which was completed on 3/23. Improving sob/ cough. Hospital course complicated by multiple episodes of diarrhea likely related to covid, started on imodium, noted hyponatremia for which transient IVF started. Also with worsening left shoulder pain, left shoulder x ray negative. Also pt initially presented with supra therapeutic INR on coumadin, which down trended and currently is sub therapeutic, given mechanical valve, dosing coumadin aggressively.  PT consulted and recommended RODOLFO, authorization in process, given covid positive and repeat pcr positive likely difficult dispo, in the interim daily PT until patient either meets goals to go home or obtains auth for rodolfo once covid test negative x 2     COVID 19 infection   - Improved   - c/w airborne and contact isolation precautions   - c/w supportive treatment  tylenol prn for fever  - s/p hydroxychloroquine and vitamin c  ended on 3/23  - weaning off of supplemental o2 as tolerated  - pt does not use o2 at home   - In order to get into RODOLFO pt needed 2 covid pcr negative,  3/25 and 3/26 remain positive , repeat test pending     Diarrhea 2/2 COVID 19/resolved   - not watery just soft  -monitor   - c/w supportive care for losses     S/p fall 2/2 generalized weakness 2/2 hypovolemia and covid 19  - no injuries noted on exam but with left shoulder pain which persists likely arthritis related   - ct head/ c spine negative  - left shoulder x ray negative  - PT evaluation noted     OMAYRA on CKD 3/ omayra resolved now   - resolved at baseline   - prerenal azotemia resolved   - Creatine back to baseline   - avoid nephrotoxic medications  - lisinopril held     Chronic Afib   -hx of mechanical valve -MVR and PPM   - rate controlled  - c/w dig and metoprolol  - dose coumadin for goal inr 2.5-3.5      Chronic diastolic CHF  - Appears euvolemic  - echo in 2015 showing ef 65%  - c/w metoprolol po bid  - holding lisinopril in the setting of covid  - c/w holding lasix until GI losses improve   - cardio f/u noted and appreciated     AAA  -Stable 4.7 cm infrarenal abdominal aortic aneurysm. Persistent occlusion of the abdominal aortic aneurysm, infrarenal aorta, and bilateral iliac arteries with distal reconstitution of the common femoral arteries.  - compared to prior ct and stable at this time   - Pt to f/u outpt with vascular sx     HTN  - bp stable   - monitor blood pressure  -c/w metoprolol  - holding lisinopril in the setting of covid     Hypothyroidism   - c/w levothyroxine po qd     Moderate Protein calorie malnutrition  - c/w ensure po tid  - nutrition consult noted and appreciated     DVT prophylaxis  - covered on coumadin   - scd boots b/l   Next of kin: Aida-daughter     dispo :  Ongoing discussions with CM/SW in regards to dispo planning. needs x 2 negative covid tests for rodolfo placement . awaiting repeat test

## 2020-03-30 NOTE — PROGRESS NOTE ADULT - SUBJECTIVE AND OBJECTIVE BOX
Patient is a 79y old  Male who presents with covid , weakness      INTERVAL HPI/OVERNIGHT EVENTS:  Patient seen and examined at bedside. [employer] interpreters phone lien used. comfortable. in no acute distress. no fever or chills. no n/v/abd pain.     Vital Signs Last 24 Hrs  T(C): 36.7 (30 Mar 2020 10:01), Max: 36.9 (29 Mar 2020 23:01)  T(F): 98.1 (30 Mar 2020 10:01), Max: 98.4 (29 Mar 2020 23:01)  HR: 65 (30 Mar 2020 10:01) (65 - 94)  BP: 148/61 (30 Mar 2020 10:01) (132/68 - 163/78)  BP(mean): --  RR: 20 (30 Mar 2020 10:01) (18 - 20)  SpO2: 89% (30 Mar 2020 10:01) (89% - 98%)    CONSTITUTIONAL: Well appearing, well nourished, awake, alert and in no apparent distress  CARDIAC: regular rhythm.  Heart sounds S1, S2.  No murmurs,   RESPIRATORY: Fair air entry  bibasilar rhonchi   GASTROENTEROLOGY : Soft nt nd bs+ normoactive   EXTREMITIES: No edema, cyanosis or deformity   SKIN: No rash, skin turgor wnl                            12.4   10.81 )-----------( 469      ( 30 Mar 2020 10:57 )             38.9   03-30    134<L>  |  93<L>  |  28.0<H>  ----------------------------<  83  4.9   |  27.0  |  0.89    Ca    9.8      30 Mar 2020 10:57    TPro  7.6  /  Alb  2.9<L>  /  TBili  0.9  /  DBili  x   /  AST  60<H>  /  ALT  68<H>  /  AlkPhos  100  03-30

## 2020-03-31 LAB
ALBUMIN SERPL ELPH-MCNC: 3 G/DL — LOW (ref 3.3–5.2)
ALP SERPL-CCNC: 113 U/L — SIGNIFICANT CHANGE UP (ref 40–120)
ALT FLD-CCNC: 72 U/L — HIGH
ANION GAP SERPL CALC-SCNC: 13 MMOL/L — SIGNIFICANT CHANGE UP (ref 5–17)
AST SERPL-CCNC: 61 U/L — HIGH
BILIRUB SERPL-MCNC: 1 MG/DL — SIGNIFICANT CHANGE UP (ref 0.4–2)
BUN SERPL-MCNC: 34 MG/DL — HIGH (ref 8–20)
CALCIUM SERPL-MCNC: 9.8 MG/DL — SIGNIFICANT CHANGE UP (ref 8.6–10.2)
CHLORIDE SERPL-SCNC: 95 MMOL/L — LOW (ref 98–107)
CO2 SERPL-SCNC: 27 MMOL/L — SIGNIFICANT CHANGE UP (ref 22–29)
CREAT SERPL-MCNC: 1.02 MG/DL — SIGNIFICANT CHANGE UP (ref 0.5–1.3)
GLUCOSE BLDC GLUCOMTR-MCNC: 142 MG/DL — HIGH (ref 70–99)
GLUCOSE SERPL-MCNC: 83 MG/DL — SIGNIFICANT CHANGE UP (ref 70–99)
HCT VFR BLD CALC: 38.7 % — LOW (ref 39–50)
HGB BLD-MCNC: 12.2 G/DL — LOW (ref 13–17)
INR BLD: 2.46 RATIO — HIGH (ref 0.88–1.16)
LDH SERPL L TO P-CCNC: 690 U/L — HIGH (ref 98–192)
MCHC RBC-ENTMCNC: 27.4 PG — SIGNIFICANT CHANGE UP (ref 27–34)
MCHC RBC-ENTMCNC: 31.5 GM/DL — LOW (ref 32–36)
MCV RBC AUTO: 87 FL — SIGNIFICANT CHANGE UP (ref 80–100)
PLATELET # BLD AUTO: 470 K/UL — HIGH (ref 150–400)
POTASSIUM SERPL-MCNC: 5.1 MMOL/L — SIGNIFICANT CHANGE UP (ref 3.5–5.3)
POTASSIUM SERPL-SCNC: 5.1 MMOL/L — SIGNIFICANT CHANGE UP (ref 3.5–5.3)
PROLACTIN SERPL-MCNC: 23.6 NG/ML — HIGH (ref 4.1–18.4)
PROT SERPL-MCNC: 7.6 G/DL — SIGNIFICANT CHANGE UP (ref 6.6–8.7)
PROTHROM AB SERPL-ACNC: 28.6 SEC — HIGH (ref 10–12.9)
RBC # BLD: 4.45 M/UL — SIGNIFICANT CHANGE UP (ref 4.2–5.8)
RBC # FLD: 15.4 % — HIGH (ref 10.3–14.5)
SARS-COV-2 RNA SPEC QL NAA+PROBE: DETECTED
SARS-COV-2 RNA SPEC QL NAA+PROBE: SIGNIFICANT CHANGE UP
SODIUM SERPL-SCNC: 135 MMOL/L — SIGNIFICANT CHANGE UP (ref 135–145)
WBC # BLD: 10.97 K/UL — HIGH (ref 3.8–10.5)
WBC # FLD AUTO: 10.97 K/UL — HIGH (ref 3.8–10.5)

## 2020-03-31 PROCEDURE — 99232 SBSQ HOSP IP/OBS MODERATE 35: CPT

## 2020-03-31 RX ORDER — WARFARIN SODIUM 2.5 MG/1
6 TABLET ORAL ONCE
Refills: 0 | Status: COMPLETED | OUTPATIENT
Start: 2020-03-31 | End: 2020-03-31

## 2020-03-31 RX ADMIN — ALBUTEROL 2 PUFF(S): 90 AEROSOL, METERED ORAL at 17:37

## 2020-03-31 RX ADMIN — Medication 25 MILLIGRAM(S): at 05:20

## 2020-03-31 RX ADMIN — LIDOCAINE 1 PATCH: 4 CREAM TOPICAL at 12:32

## 2020-03-31 RX ADMIN — Medication 0.25 MILLIGRAM(S): at 05:20

## 2020-03-31 RX ADMIN — ALBUTEROL 2 PUFF(S): 90 AEROSOL, METERED ORAL at 12:32

## 2020-03-31 RX ADMIN — Medication 50 MICROGRAM(S): at 05:20

## 2020-03-31 RX ADMIN — LIDOCAINE 1 PATCH: 4 CREAM TOPICAL at 21:16

## 2020-03-31 RX ADMIN — Medication 1000 UNIT(S): at 12:34

## 2020-03-31 RX ADMIN — TIOTROPIUM BROMIDE 1 CAPSULE(S): 18 CAPSULE ORAL; RESPIRATORY (INHALATION) at 17:37

## 2020-03-31 RX ADMIN — Medication 25 MILLIGRAM(S): at 17:38

## 2020-03-31 RX ADMIN — ALBUTEROL 2 PUFF(S): 90 AEROSOL, METERED ORAL at 22:00

## 2020-03-31 RX ADMIN — WARFARIN SODIUM 6 MILLIGRAM(S): 2.5 TABLET ORAL at 23:33

## 2020-03-31 RX ADMIN — Medication 100 MILLIGRAM(S): at 12:34

## 2020-03-31 NOTE — PROGRESS NOTE ADULT - NSHPATTENDINGPLANDISCUSS_GEN_ALL_CORE
patient RN cm sw
patient cm sw
patient rn
patient rn CM
patient rn cm daughter
patient rn cm sw
patient RN

## 2020-03-31 NOTE — PROGRESS NOTE ADULT - SUBJECTIVE AND OBJECTIVE BOX
Patient is a 79y old  Male who presents with covid , weakness      INTERVAL HPI/OVERNIGHT EVENTS:  Patient seen and examined at bedside. [employer] used. comfortable. in no acute distress.   no fever or chills. no n/v/abd pain. covid x 1 negative. pending second test.     Vital Signs Last 24 Hrs  T(C): 36.1 (31 Mar 2020 13:00), Max: 37 (30 Mar 2020 16:00)  T(F): 97 (31 Mar 2020 13:00), Max: 98.6 (30 Mar 2020 16:00)  HR: 78 (31 Mar 2020 13:00) (76 - 86)  BP: 148/60 (31 Mar 2020 05:13) (147/68 - 148/60)  BP(mean): --  RR: 18 (31 Mar 2020 13:00) (18 - 20)  SpO2: 94% (31 Mar 2020 13:00) (92% - 94%)    CONSTITUTIONAL: Well appearing, well nourished, awake, alert and in no apparent distress  CARDIAC: regular rhythm.  Heart sounds S1, S2.  No murmurs,   RESPIRATORY: Fair air entry  bibasilar rhonchi   GASTROENTEROLOGY : Soft nt nd bs+ normoactive   EXTREMITIES: No edema, cyanosis or deformity   SKIN: No rash, skin turgor wnl                          12.2   10.97 )-----------( 470      ( 31 Mar 2020 10:42 )             38.7   03-31    135  |  95<L>  |  34.0<H>  ----------------------------<  83  5.1   |  27.0  |  1.02    Ca    9.8      31 Mar 2020 10:42    TPro  7.6  /  Alb  3.0<L>  /  TBili  1.0  /  DBili  x   /  AST  61<H>  /  ALT  72<H>  /  AlkPhos  113  03-31

## 2020-03-31 NOTE — PROGRESS NOTE ADULT - ASSESSMENT
79 year old male with hx of HTN, Afib on coumadin s/p PPM, Mechanical Mitral valve, chronic diastolic HF, CVA presented to ER generalized weakness, flu like sx x 3 days, continual diarrhea and s/p fall out of bed. Pan ct head/ c spine negative. Noted to be covid positive 3 days prior to hospitalization. Pt admitted with COVID 19. Empirically remained on hydroxychloroquin and vit c which was completed on 3/23. Improving sob/ cough. Hospital course complicated by multiple episodes of diarrhea likely related to covid, started on imodium, noted hyponatremia for which transient IVF started. Also with worsening left shoulder pain, left shoulder x ray negative. Also pt initially presented with supra therapeutic INR on coumadin, which down trended and currently is sub therapeutic, given mechanical valve, dosing coumadin aggressively.  PT consulted and recommended RODOLFO, authorization in process, given covid positive and repeat pcr positive likely difficult dispo, in the interim daily PT until patient either meets goals to go home or obtains auth for rodolfo once covid test negative x 2     COVID 19 infection   - Improved  - c/w airborne and contact isolation precautions   - c/w supportive treatment  tylenol prn for fever  - s/p hydroxychloroquine and vitamin c  ended on 3/23  - weaning off of supplemental o2 as tolerated  - pt does not use o2 at home   - In order to get into RODOL[facility] pt needed 2x covid pcr negative,  repeat x 1 neg , pending second test     Diarrhea 2/2 COVID 19/resolved   - not watery just soft  -monitor   - c/w supportive care for losses     S/p fall 2/2 generalized weakness 2/2 hypovolemia and covid 19  - no injuries noted on exam but with left shoulder pain which persists likely arthritis related   - ct head/ c spine negative  - left shoulder x ray negative  - PT evaluation noted     OMAYRA on CKD 3/ omayra resolved now   - resolved at baseline   - prerenal azotemia resolved   - Creatine back to baseline   - avoid nephrotoxic medications  - lisinopril held     Chronic Afib   -hx of mechanical valve -MVR and PPM   - rate controlled  - c/w dig and metoprolol  - dose coumadin for goal inr 2.5-3.5      Chronic diastolic CHF  - Appears euvolemic  - echo in 2015 showing ef 65%  - c/w metoprolol po bid  - holding lisinopril in the setting of covid  - c/w holding lasix until GI losses improve   - cardio f/u noted and appreciated     AAA  -Stable 4.7 cm infrarenal abdominal aortic aneurysm. Persistent occlusion of the abdominal aortic aneurysm, infrarenal aorta, and bilateral iliac arteries with distal reconstitution of the common femoral arteries.  - compared to prior ct and stable at this time   - Pt to f/u outpt with vascular sx     HTN  - bp stable   - monitor blood pressure  -c/w metoprolol  - holding lisinopril in the setting of covid     Hypothyroidism   - c/w levothyroxine po qd     Moderate Protein calorie malnutrition  - c/w ensure po tid  - nutrition consult noted and appreciated     DVT prophylaxis  - covered on coumadin   - scd boots b/l   Next of kin: Aida-daughter     dispo :  Ongoing discussions with CM/SW in regards to dispo planning. needs x 2 negative covid tests for rodolfo placement . awaiting repeat test

## 2020-04-01 ENCOUNTER — TRANSCRIPTION ENCOUNTER (OUTPATIENT)
Age: 80
End: 2020-04-01

## 2020-04-01 VITALS
OXYGEN SATURATION: 94 % | HEART RATE: 83 BPM | DIASTOLIC BLOOD PRESSURE: 67 MMHG | TEMPERATURE: 98 F | RESPIRATION RATE: 19 BRPM | SYSTOLIC BLOOD PRESSURE: 128 MMHG

## 2020-04-01 PROCEDURE — 73030 X-RAY EXAM OF SHOULDER: CPT

## 2020-04-01 PROCEDURE — 85610 PROTHROMBIN TIME: CPT

## 2020-04-01 PROCEDURE — 82728 ASSAY OF FERRITIN: CPT

## 2020-04-01 PROCEDURE — 71045 X-RAY EXAM CHEST 1 VIEW: CPT

## 2020-04-01 PROCEDURE — 97116 GAIT TRAINING THERAPY: CPT

## 2020-04-01 PROCEDURE — 36415 COLL VENOUS BLD VENIPUNCTURE: CPT

## 2020-04-01 PROCEDURE — T1013: CPT

## 2020-04-01 PROCEDURE — 82962 GLUCOSE BLOOD TEST: CPT

## 2020-04-01 PROCEDURE — 83615 LACTATE (LD) (LDH) ENZYME: CPT

## 2020-04-01 PROCEDURE — 85652 RBC SED RATE AUTOMATED: CPT

## 2020-04-01 PROCEDURE — 85027 COMPLETE CBC AUTOMATED: CPT

## 2020-04-01 PROCEDURE — 99285 EMERGENCY DEPT VISIT HI MDM: CPT

## 2020-04-01 PROCEDURE — 87635 SARS-COV-2 COVID-19 AMP PRB: CPT

## 2020-04-01 PROCEDURE — 94640 AIRWAY INHALATION TREATMENT: CPT

## 2020-04-01 PROCEDURE — 99239 HOSP IP/OBS DSCHRG MGMT >30: CPT

## 2020-04-01 PROCEDURE — 86140 C-REACTIVE PROTEIN: CPT

## 2020-04-01 PROCEDURE — 80053 COMPREHEN METABOLIC PANEL: CPT

## 2020-04-01 PROCEDURE — 70450 CT HEAD/BRAIN W/O DYE: CPT

## 2020-04-01 PROCEDURE — 87493 C DIFF AMPLIFIED PROBE: CPT

## 2020-04-01 PROCEDURE — 85730 THROMBOPLASTIN TIME PARTIAL: CPT

## 2020-04-01 PROCEDURE — 72125 CT NECK SPINE W/O DYE: CPT

## 2020-04-01 PROCEDURE — 84146 ASSAY OF PROLACTIN: CPT

## 2020-04-01 PROCEDURE — 80048 BASIC METABOLIC PNL TOTAL CA: CPT

## 2020-04-01 PROCEDURE — 97530 THERAPEUTIC ACTIVITIES: CPT

## 2020-04-01 PROCEDURE — 94760 N-INVAS EAR/PLS OXIMETRY 1: CPT

## 2020-04-01 PROCEDURE — 84484 ASSAY OF TROPONIN QUANT: CPT

## 2020-04-01 PROCEDURE — 97110 THERAPEUTIC EXERCISES: CPT

## 2020-04-01 PROCEDURE — 97163 PT EVAL HIGH COMPLEX 45 MIN: CPT

## 2020-04-01 PROCEDURE — 80162 ASSAY OF DIGOXIN TOTAL: CPT

## 2020-04-01 PROCEDURE — 93005 ELECTROCARDIOGRAM TRACING: CPT

## 2020-04-01 RX ORDER — LEVOTHYROXINE SODIUM 125 MCG
1 TABLET ORAL
Qty: 0 | Refills: 0 | DISCHARGE
Start: 2020-04-01

## 2020-04-01 RX ADMIN — ALBUTEROL 2 PUFF(S): 90 AEROSOL, METERED ORAL at 04:00

## 2020-04-01 RX ADMIN — TIOTROPIUM BROMIDE 1 CAPSULE(S): 18 CAPSULE ORAL; RESPIRATORY (INHALATION) at 09:48

## 2020-04-01 RX ADMIN — Medication 25 MILLIGRAM(S): at 05:32

## 2020-04-01 RX ADMIN — LIDOCAINE 1 PATCH: 4 CREAM TOPICAL at 00:00

## 2020-04-01 RX ADMIN — LIDOCAINE 1 PATCH: 4 CREAM TOPICAL at 12:55

## 2020-04-01 RX ADMIN — Medication 0.25 MILLIGRAM(S): at 05:32

## 2020-04-01 RX ADMIN — Medication 100 MILLIGRAM(S): at 12:55

## 2020-04-01 RX ADMIN — Medication 50 MICROGRAM(S): at 05:32

## 2020-04-01 RX ADMIN — Medication 1000 UNIT(S): at 12:55

## 2020-04-01 RX ADMIN — ALBUTEROL 2 PUFF(S): 90 AEROSOL, METERED ORAL at 09:48

## 2020-04-01 NOTE — DISCHARGE NOTE PROVIDER - HOSPITAL COURSE
79yr old male with PMH of HTN, Afib, Mechanical Mitral valve on warfarin, CHF, CVA presented to ER after he slipped out of bed, felt weak. He was seen in ER 3 days prior for flu-like- subjective fever, myalgias and cough x 3 days, also with diarrhea. He was diagnosed COVID-19 positive, wife also admitted to hospital with the same. He was discharged home with appropriate instructions. In ER, he is HD stable, denies any fever, chills, cough. Has mild diarrhea without any abdominal pain. was found to have OMAYRA and Hyponatremia. Imaging including Ct head, neck - neg for acute findings. He is now being admitted for further management. 79yr old male with PMH of HTN, Afib, Mechanical Mitral valve on warfarin, CHF, CVA presented to ER after he slipped out of bed, felt weak. He was seen in ER 3 days prior for flu-like- subjective fever, myalgias and cough x 3 days, also with diarrhea. He was diagnosed COVID-19 positive, wife also admitted to hospital with the same. He was discharged home with appropriate instructions. In ER, he is HD stable, denies any fever, chills, cough. Has mild diarrhea without any abdominal pain. was found to have OMAYRA and Hyponatremia. Imaging including Ct head, neck - neg for acute findings. He is now being admitted for further management. Pt condition improved with treatment during hospital admission.  He was discharged home in stable condition 79 year old male with hx of HTN, Afib on coumadin s/p PPM, Mechanical Mitral valve, chronic diastolic HF, CVA presented to ER generalized weakness, flu like sx x 3 days, continual diarrhea and s/p fall out of bed. Pan ct head/ c spine negative. Noted to be covid positive 3 days prior to hospitalization. Pt admitted with COVID 19. Empirically remained on hydroxychloroquin and vit c which was completed on 3/23. Improving sob/ cough. Hospital course complicated by multiple episodes of diarrhea likely related to covid, started on imodium, noted hyponatremia for which transient IVF started. Also with worsening left shoulder pain, left shoulder x ray negative. Also pt initially presented with supra therapeutic INR on coumadin, which down trended and currently is sub therapeutic, given mechanical valve, dosing coumadin aggressively.  PT consulted and recommended VAMSI. Pt condition improved with treatment during hospital admission.  He was discharged home in stable condition 79 old male with hx of HTN, Afib on coumadin s/p PPM, Mechanical Mitral valve, chronic diastolic HF, CVA presented to ER generalized weakness, flu like sx x 3 days, continual diarrhea and s/p fall out of bed. Pan ct head/ c spine negative. Noted to be covid positive 3 days prior to hospitalization. Pt admitted with COVID 19. Empirically remained on hydroxychloroquin and vit c which was completed on 3/23. Improving sob/ cough. Hospital course complicated by multiple episodes of diarrhea likely related to covid, started on imodium, noted hyponatremia for which transient IVF started. Also with worsening left shoulder pain, left shoulder x ray negative. Also pt initially presented with supra therapeutic INR on coumadin, which down trended and currently is sub therapeutic, given mechanical valve, dosing coumadin aggressively.  PT consulted and recommended VAMSI. Pt condition improved with treatment during hospital admission.  He was discharged home in stable condition. plan of care discussed daughter Glenis over the phone. patient and daughter agree for discharge to rehab,         Vital Signs Last 24 Hrs    T(C): 36.6 (01 Apr 2020 06:00), Max: 36.6 (01 Apr 2020 06:00)    T(F): 97.8 (01 Apr 2020 06:00), Max: 97.8 (01 Apr 2020 06:00)    HR: 85 (01 Apr 2020 06:00) (82 - 85)    BP: 127/68 (01 Apr 2020 06:00) (127/68 - 135/65)    BP(mean): --    RR: 18 (31 Mar 2020 22:00) (18 - 18)    SpO2: 92% (01 Apr 2020 06:00) (92% - 96%)        CONSTITUTIONAL: Well appearing, well nourished, awake, alert and in no apparent distress    CARDIAC: regular rhythm.  Heart sounds S1, S2.  No murmurs,     RESPIRATORY: Fair air entry  bibasilar rhonchi     GASTROENTEROLOGY : Soft nt nd bs+ normoactive     EXTREMITIES: No edema, cyanosis or deformity     SKIN: No rash, skin turgor wnl        time spent 44 mins.

## 2020-04-01 NOTE — DISCHARGE NOTE PROVIDER - CARE PROVIDER_API CALL
Gera Capone (DO)  Cardiovascular Disease; Internal Medicine  [address] [place] [address]  Phone: [phone]  Fax: [phone]  Follow Up Time: 1 week    primary care,   Phone: (   )    -  Fax: (   )    -  Follow Up Time: 1-3 days

## 2020-04-01 NOTE — DISCHARGE NOTE PROVIDER - PROVIDER TOKENS
PROVIDER:[TOKEN:[[id number]],FOLLOWUP:[1 week]],FREE:[LAST:[primary care],PHONE:[(   )    -],FAX:[(   )    -],FOLLOWUP:[1-3 days]]

## 2020-04-01 NOTE — DISCHARGE NOTE PROVIDER - NSDCFUADDINST_GEN_ALL_CORE_FT
Follow-up with your doctor within 2-3 days. Take Tylenol (Acetaminophen) 650mg every 6 hours as needed for pain. Stay well hydrated. It has been determined that you no longer need hospitalization and can recover while remaining in self-quarantine at home for 14 days. You should follow the prevention steps below until a healthcare provider or [facility] says you can return to normal activities. Please remain in quarantine until  then. You should restrict activities outside your home except for getting medical care. Do not go to work, school, or public areas. Avoid using public transportation. Separate yourself from other people and animals in your home. Cover your cough and sneezes. Clean your hands often. Avoid sharing personal household items. Clean all high touch surfaces. Monitor your symptoms. If you have a medical emergency, call 911. PLEASE FOLLOW ADDITIONAL COVID DISCHARGE PACKET INSTRUCTIONS.

## 2020-04-01 NOTE — DISCHARGE NOTE PROVIDER - NSDCMRMEDTOKEN_GEN_ALL_CORE_FT
acetaminophen 325 mg oral tablet: 2 tab(s) orally every 6 hours, As needed,  Pain  ascorbic acid 500 mg oral tablet: 1 tab(s) orally once a day  Aspir 81 oral delayed release tablet: 1 tab(s) orally once a day  bacitracin 500 units/g topical ointment: 1 application topically 2 times a day  digoxin 250 mcg (0.25 mg) oral tablet: 1 tab(s) orally once a day  docusate sodium 100 mg oral capsule: 1 cap(s) orally 3 times a day  gabapentin 300 mg oral capsule: 1 cap(s) orally once a day  Lasix 20 mg oral tablet: 1 tab(s) orally once a day  lisinopril 5 mg oral tablet: 1 tab(s) orally once a day  metoprolol tartrate 25 mg oral tablet: 1 tab(s) orally 2 times a day  Multiple Vitamins oral tablet: 1 tab(s) orally once a day  NexIUM 40 mg oral delayed release capsule: 1 cap(s) orally once a day  warfarin 6 mg oral tablet: 1 tab(s) orally once a day (at bedtime) acetaminophen 325 mg oral tablet: 2 tab(s) orally every 6 hours, As needed,  Pain  ascorbic acid 500 mg oral tablet: 1 tab(s) orally once a day  Aspir 81 oral delayed release tablet: 1 tab(s) orally once a day  bacitracin 500 units/g topical ointment: 1 application topically 2 times a day  digoxin 250 mcg (0.25 mg) oral tablet: 1 tab(s) orally once a day  docusate sodium 100 mg oral capsule: 1 cap(s) orally 3 times a day  gabapentin 300 mg oral capsule: 1 cap(s) orally once a day  Lasix 20 mg oral tablet: 1 tab(s) orally once a day  levothyroxine 50 mcg (0.05 mg) oral tablet: 1 tab(s) orally once a day  lisinopril 5 mg oral tablet: 1 tab(s) orally once a day  metoprolol tartrate 25 mg oral tablet: 1 tab(s) orally 2 times a day  Multiple Vitamins oral tablet: 1 tab(s) orally once a day  NexIUM 40 mg oral delayed release capsule: 1 cap(s) orally once a day  warfarin 6 mg oral tablet: 1 tab(s) orally once a day (at bedtime)

## 2020-04-01 NOTE — DISCHARGE NOTE NURSING/CASE MANAGEMENT/SOCIAL WORK - PATIENT PORTAL LINK FT
You can access the FollowMyHealth Patient Portal offered by [facility] by registering at the following website: [email or web address]. By joining [facility]’s FollowMyHealth portal, you will also be able to view your health information using other applications (apps) compatible with our system.

## 2020-04-01 NOTE — DISCHARGE NOTE PROVIDER - NSDCCPCAREPLAN_GEN_ALL_CORE_FT
PRINCIPAL DISCHARGE DIAGNOSIS  Diagnosis: Coronavirus infection  Assessment and Plan of Treatment:       SECONDARY DISCHARGE DIAGNOSES  Diagnosis: Hyponatremia  Assessment and Plan of Treatment: PRINCIPAL DISCHARGE DIAGNOSIS  Diagnosis: Coronavirus infection  Assessment and Plan of Treatment:       SECONDARY DISCHARGE DIAGNOSES  Diagnosis: Moderate protein malnutrition  Assessment and Plan of Treatment:     Diagnosis: Chronic atrial fibrillation  Assessment and Plan of Treatment: dose couamdin for INR goal 2.5-3.5. Monitor INR

## 2020-06-16 NOTE — PROGRESS NOTE ADULT - ASSESSMENT
[FreeTextEntry1] : Mammogram and sonogram were reviewed on  the disc along with the reports and discussed with the patient and family 79 year old male with hx of HTN, Afib on coumadin, Mechanical Mitral valve, chronic diastolic HF, CVA presented to ER generalized weakness, flu like sx x 3 days and s/p fall out of bed. Pan ct head/ c spine negative. Noted to be covid positive. Pt admitted with COVID 19. Empirically remains on hydroxychloroquin and vit c end date 3/23. Improving sob. PT consulted and recommended RODOLFO, authorization in process.     # COVID positive   - Improving sob, dry cough remains   - c/w airborne and contact isolation precautions  - c/w supportive treatment  -c/w  tylenol prn for fever  - c/w robitussin prn   - c/w tessalone pearls prn   - c/w hydroxychloroquine and vitamin c to end on 3/23  - no longer on supplemental o2   - ID f/u noted and appreciated    # S/p fall 2/2 generalized weakness 2/2 hypovolemia and covid 19  - no injuries noted on exam   - imaging studies negative   - PT evaluation noted     # OMAYRA on CKD 3  - resolved at baseline   - prerenal azotemia resolved   - Creatine back to baseline   - avoid nephrotoxic medications  - lisinopril held     # Hyponatremia  2/2 hypovolemia - resolved   - sodium improving and stable    # Chronic Afib   -hx of mechanical valve -MVR   - rate controlled  - c/w dig and metoprolol  - INR- today 3.6  - goal is 2.5-3.5  will re -dose coumadin 3 mg po qhs   - f/u INR in the am  -cardio f/u noted and appreciated      # Chronic diastolic CHF  - appears euvolemic  - echo in 2015 showing ef 65%  - c/w metoprolol po bid  - holding lisinopril in the setting of covid  - cardio f/u noted and appreciated     #AAA  -Stable 4.7 cm infrarenal abdominal aortic aneurysm. Persistent occlusion of the abdominal aortic aneurysm, infrarenal aorta, and bilateral iliac arteries with distal reconstitution of the common femoral arteries.  - Pt to f/u outpt with vascular sx     #HTN  = bp stable   - monitor blood pressure  -c/w metoprolol  - holding lisinopril in the setting of covid     #Hypothyroidism   - levothyroxine     # DVT prophylaxis  - covered on coumadin   - scd boots b/l     Dispo: PT consulted and recommended RODOLFO, pending rodolfo placement. D/w CM the plan of care.     Updated Aida-daughter in regards to the plan of care. She has been distraught bc her mother is intubated at [facility] ICU, her uncle is intubated in the [place] ICU, her aunt just passed away yest and her other aunt is at [facility] doing poorly. She has a six year old son and reports it is a very difficult time for her. She also states pt at baseline ambulates with cane and has two steps to get into his home. Emotional support provided to her. 79 year old male with hx of HTN, Afib on coumadin, Mechanical Mitral valve, chronic diastolic HF, CVA presented to ER generalized weakness, flu like sx x 3 days and s/p fall out of bed. Pan ct head/ c spine negative. Noted to be covid positive. Pt admitted with COVID 19. Empirically remains on hydroxychloroquin and vit c end date 3/23. Improving sob. Hospital course complicated by multiple episodes of diarrhea unclear if related to covid or possibly c diff. C. Diff testing pending.  PT consulted and recommended RODOLFO, authorization in process.     # COVID positive   - Improving sob, dry cough remains   - c/w airborne and contact isolation precautions  - c/w supportive treatment  -c/w  tylenol prn for fever  - c/w robitussin prn   - c/w tessalone pearls prn   - c/w hydroxychloroquine and vitamin c to end on 3/23  - no longer on supplemental o2   - ID f/u noted and appreciated    Diarrhea  -could be related to covid, will rule out C diff   - multiple episodes of diarrhea 5 yest and 3 today   - f/u C/ diff   - c/w monitoring stool count     # S/p fall 2/2 generalized weakness 2/2 hypovolemia and covid 19  - no injuries noted on exam   - imaging studies negative   - PT evaluation noted     # OMAYRA on CKD 3  - resolved at baseline   - prerenal azotemia resolved   - Creatine back to baseline   - avoid nephrotoxic medications  - lisinopril held     # Hyponatremia  2/2 hypovolemia - resolved   - sodium improving and stable    # Chronic Afib   -hx of mechanical valve -MVR   - rate controlled  - c/w dig and metoprolol  - INR- today 3.6  - goal is 2.5-3.5  will re -dose coumadin 3 mg po qhs   - f/u INR in the am  -cardio f/u noted and appreciated      # Chronic diastolic CHF  - appears euvolemic  - echo in 2015 showing ef 65%  - c/w metoprolol po bid  - holding lisinopril in the setting of covid  - cardio f/u noted and appreciated     #AAA  -Stable 4.7 cm infrarenal abdominal aortic aneurysm. Persistent occlusion of the abdominal aortic aneurysm, infrarenal aorta, and bilateral iliac arteries with distal reconstitution of the common femoral arteries.  - Pt to f/u outpt with vascular sx     #HTN  = bp stable   - monitor blood pressure  -c/w metoprolol  - holding lisinopril in the setting of covid     #Hypothyroidism   - levothyroxine     # DVT prophylaxis  - covered on coumadin   - scd boots b/l     Dispo: PT consulted and recommended RODOLFO, pending rodolfo placement. D/w CM the plan of care.     Updated Aida-daughter in regards to the plan of care. She has been distraught bc her mother is intubated at [facility] ICU, her uncle is intubated in the [place] ICU, her aunt just passed away yest and her other aunt is at [facility] doing poorly. She has a six year old son and reports it is a very difficult time for her. She also states pt at baseline ambulates with cane and has two steps to get into his home. Emotional support provided to her.

## 2020-07-08 ENCOUNTER — EMERGENCY (EMERGENCY)
Facility: HOSPITAL | Age: 80
LOS: 1 days | Discharge: HOME | End: 2020-07-08
Attending: EMERGENCY MEDICINE
Payer: MEDICARE

## 2020-07-08 VITALS
OXYGEN SATURATION: 98 % | HEART RATE: 87 BPM | SYSTOLIC BLOOD PRESSURE: 186 MMHG | DIASTOLIC BLOOD PRESSURE: 73 MMHG | WEIGHT: 186.07 LBS | TEMPERATURE: 99 F | HEIGHT: 65 IN | RESPIRATION RATE: 20 BRPM

## 2020-07-08 VITALS
OXYGEN SATURATION: 97 % | RESPIRATION RATE: 18 BRPM | HEART RATE: 59 BPM | SYSTOLIC BLOOD PRESSURE: 171 MMHG | DIASTOLIC BLOOD PRESSURE: 65 MMHG | TEMPERATURE: 98 F

## 2020-07-08 DIAGNOSIS — Z87.19 PERSONAL HISTORY OF OTHER DISEASES OF THE DIGESTIVE SYSTEM: Chronic | ICD-10-CM

## 2020-07-08 DIAGNOSIS — I05.9 RHEUMATIC MITRAL VALVE DISEASE, UNSPECIFIED: Chronic | ICD-10-CM

## 2020-07-08 LAB
ALBUMIN SERPL ELPH-MCNC: 3.7 G/DL — SIGNIFICANT CHANGE UP (ref 3.3–5.2)
ALP SERPL-CCNC: 116 U/L — SIGNIFICANT CHANGE UP (ref 40–120)
ALT FLD-CCNC: 15 U/L — SIGNIFICANT CHANGE UP
ANION GAP SERPL CALC-SCNC: 14 MMOL/L — SIGNIFICANT CHANGE UP (ref 5–17)
APTT BLD: 45.4 SEC — HIGH (ref 27.5–35.5)
AST SERPL-CCNC: 25 U/L — SIGNIFICANT CHANGE UP
BASOPHILS # BLD AUTO: 0.03 K/UL — SIGNIFICANT CHANGE UP (ref 0–0.2)
BASOPHILS NFR BLD AUTO: 0.4 % — SIGNIFICANT CHANGE UP (ref 0–2)
BILIRUB SERPL-MCNC: 0.8 MG/DL — SIGNIFICANT CHANGE UP (ref 0.4–2)
BUN SERPL-MCNC: 16 MG/DL — SIGNIFICANT CHANGE UP (ref 8–20)
CALCIUM SERPL-MCNC: 9.1 MG/DL — SIGNIFICANT CHANGE UP (ref 8.6–10.2)
CHLORIDE SERPL-SCNC: 98 MMOL/L — SIGNIFICANT CHANGE UP (ref 98–107)
CO2 SERPL-SCNC: 24 MMOL/L — SIGNIFICANT CHANGE UP (ref 22–29)
CREAT SERPL-MCNC: 1.04 MG/DL — SIGNIFICANT CHANGE UP (ref 0.5–1.3)
EOSINOPHIL # BLD AUTO: 0.03 K/UL — SIGNIFICANT CHANGE UP (ref 0–0.5)
EOSINOPHIL NFR BLD AUTO: 0.4 % — SIGNIFICANT CHANGE UP (ref 0–6)
GLUCOSE SERPL-MCNC: 88 MG/DL — SIGNIFICANT CHANGE UP (ref 70–99)
HCT VFR BLD CALC: 39.2 % — SIGNIFICANT CHANGE UP (ref 39–50)
HGB BLD-MCNC: 12.1 G/DL — LOW (ref 13–17)
IMM GRANULOCYTES NFR BLD AUTO: 0.5 % — SIGNIFICANT CHANGE UP (ref 0–1.5)
INR BLD: 2.38 RATIO — HIGH (ref 0.88–1.16)
LIDOCAIN IGE QN: 39 U/L — SIGNIFICANT CHANGE UP (ref 22–51)
LYMPHOCYTES # BLD AUTO: 1.08 K/UL — SIGNIFICANT CHANGE UP (ref 1–3.3)
LYMPHOCYTES # BLD AUTO: 13.8 % — SIGNIFICANT CHANGE UP (ref 13–44)
MCHC RBC-ENTMCNC: 27.4 PG — SIGNIFICANT CHANGE UP (ref 27–34)
MCHC RBC-ENTMCNC: 30.9 GM/DL — LOW (ref 32–36)
MCV RBC AUTO: 88.7 FL — SIGNIFICANT CHANGE UP (ref 80–100)
MONOCYTES # BLD AUTO: 0.61 K/UL — SIGNIFICANT CHANGE UP (ref 0–0.9)
MONOCYTES NFR BLD AUTO: 7.8 % — SIGNIFICANT CHANGE UP (ref 2–14)
NEUTROPHILS # BLD AUTO: 6.01 K/UL — SIGNIFICANT CHANGE UP (ref 1.8–7.4)
NEUTROPHILS NFR BLD AUTO: 77.1 % — HIGH (ref 43–77)
NT-PROBNP SERPL-SCNC: 2052 PG/ML — HIGH (ref 0–300)
PLATELET # BLD AUTO: 193 K/UL — SIGNIFICANT CHANGE UP (ref 150–400)
POTASSIUM SERPL-MCNC: 4.2 MMOL/L — SIGNIFICANT CHANGE UP (ref 3.5–5.3)
POTASSIUM SERPL-SCNC: 4.2 MMOL/L — SIGNIFICANT CHANGE UP (ref 3.5–5.3)
PROT SERPL-MCNC: 7.5 G/DL — SIGNIFICANT CHANGE UP (ref 6.6–8.7)
PROTHROM AB SERPL-ACNC: 26.5 SEC — HIGH (ref 10.6–13.6)
RBC # BLD: 4.42 M/UL — SIGNIFICANT CHANGE UP (ref 4.2–5.8)
RBC # FLD: 14.7 % — HIGH (ref 10.3–14.5)
SODIUM SERPL-SCNC: 136 MMOL/L — SIGNIFICANT CHANGE UP (ref 135–145)
TROPONIN T SERPL-MCNC: <0.01 NG/ML — SIGNIFICANT CHANGE UP (ref 0–0.06)
WBC # BLD: 7.8 K/UL — SIGNIFICANT CHANGE UP (ref 3.8–10.5)
WBC # FLD AUTO: 7.8 K/UL — SIGNIFICANT CHANGE UP (ref 3.8–10.5)

## 2020-07-08 PROCEDURE — 80053 COMPREHEN METABOLIC PANEL: CPT

## 2020-07-08 PROCEDURE — 83690 ASSAY OF LIPASE: CPT

## 2020-07-08 PROCEDURE — 85027 COMPLETE CBC AUTOMATED: CPT

## 2020-07-08 PROCEDURE — 71045 X-RAY EXAM CHEST 1 VIEW: CPT | Mod: 26

## 2020-07-08 PROCEDURE — G0378: CPT

## 2020-07-08 PROCEDURE — 96374 THER/PROPH/DIAG INJ IV PUSH: CPT

## 2020-07-08 PROCEDURE — 83880 ASSAY OF NATRIURETIC PEPTIDE: CPT

## 2020-07-08 PROCEDURE — 93306 TTE W/DOPPLER COMPLETE: CPT

## 2020-07-08 PROCEDURE — 85610 PROTHROMBIN TIME: CPT

## 2020-07-08 PROCEDURE — 93971 EXTREMITY STUDY: CPT

## 2020-07-08 PROCEDURE — 84484 ASSAY OF TROPONIN QUANT: CPT

## 2020-07-08 PROCEDURE — 93005 ELECTROCARDIOGRAM TRACING: CPT

## 2020-07-08 PROCEDURE — 85730 THROMBOPLASTIN TIME PARTIAL: CPT

## 2020-07-08 PROCEDURE — 99234 HOSP IP/OBS SM DT SF/LOW 45: CPT

## 2020-07-08 PROCEDURE — 93971 EXTREMITY STUDY: CPT | Mod: 26,LT

## 2020-07-08 PROCEDURE — 36415 COLL VENOUS BLD VENIPUNCTURE: CPT

## 2020-07-08 PROCEDURE — T1013: CPT

## 2020-07-08 PROCEDURE — 93010 ELECTROCARDIOGRAM REPORT: CPT

## 2020-07-08 PROCEDURE — 71045 X-RAY EXAM CHEST 1 VIEW: CPT

## 2020-07-08 PROCEDURE — C8929: CPT

## 2020-07-08 PROCEDURE — 99284 EMERGENCY DEPT VISIT MOD MDM: CPT | Mod: 25

## 2020-07-08 RX ORDER — FUROSEMIDE 40 MG
40 TABLET ORAL ONCE
Refills: 0 | Status: COMPLETED | OUTPATIENT
Start: 2020-07-08 | End: 2020-07-08

## 2020-07-08 RX ADMIN — Medication 40 MILLIGRAM(S): at 04:41

## 2020-07-08 NOTE — ED ADULT NURSE NOTE - OBJECTIVE STATEMENT
pt A&Ox3 in NAD. respirations even and unlabored. moving all extremities. ambulates with assistance and cane. pt presents from home reporting L sided chest pain with SOB that had some relief after taking tums. CM in place HR 66. o2 sat 98% on RA. PIV placed. labs sent. ekg preformed.

## 2020-07-08 NOTE — ED ADULT TRIAGE NOTE - CHIEF COMPLAINT QUOTE
pt with left abd pain. pt states it was difficult to breath when he had pain but now it went away. pt denies any pain at this time. pt took tums and the pain went away pt started 2 to 3 ago.

## 2020-07-08 NOTE — ED PROVIDER NOTE - ATTENDING CONTRIBUTION TO CARE
78 yo M with hx of A-fib, s/p MVR, CHF, PVD s/p PPM, s/p COVID presents to ED c/o L sided chest pain with assoc SOB. On exam pt appears uncomfortable and mildly tachypneic. mm moist, Neck supple, Cor Irreg, Irreg with Gr 3/6 PAULETTE, Lungs + bibasilar crackles, Ext + LLE > RLE selling with skin changes c/w PVD, Neuro non-focal.  EKG intermittently paced with underlying A-fib. Will check CXR, labs, BnP, check LLE doppler and re-eval

## 2020-07-08 NOTE — ED CDU PROVIDER DISPOSITION NOTE - PATIENT PORTAL LINK FT
You can access the FollowMyHealth Patient Portal offered by [facility] by registering at the following website: [email or web address]. By joining [employer]’s FollowMyHealth portal, you will also be able to view your health information using other applications (apps) compatible with our system.

## 2020-07-08 NOTE — ED ADULT NURSE REASSESSMENT NOTE - COMFORT CARE
Empty pt Urinal.
Incont. care given, partial bath, linen and gown changed/repositioned/warm blanket provided
incontinence care provided/plan of care explained/po fluids offered
plan of care explained/repositioned/po fluids offered/side rails up

## 2020-07-08 NOTE — ED PROVIDER NOTE - OBJECTIVE STATEMENT
Pt is a 80 y/o male with a hx of A fib, s/p MVR, CHF, PVD, s/p PPM, s/p Covid presenting to the ed with left sided chest pain and associated shortness of breath that started tonight. pt returned from rehab from [facility] at the end of may. pt cardiologist is dr ken. pt denies abd pain, nausea, vomiting, palpitations, fevers, back pain, dysuria

## 2020-07-08 NOTE — ED ADULT NURSE NOTE - NSIMPLEMENTINTERV_GEN_ALL_ED
Implemented All Fall Risk Interventions:  [place] to call system. Call bell, personal items and telephone within reach. Instruct patient to call for assistance. Room bathroom lighting operational. Non-slip footwear when patient is off stretcher. Physically safe environment: no spills, clutter or unnecessary equipment. Stretcher in lowest position, wheels locked, appropriate side rails in place. Provide visual cue, wrist band, yellow gown, etc. Monitor gait and stability. Monitor for mental status changes and reorient to person, place, and time. Review medications for side effects contributing to fall risk. Reinforce activity limits and safety measures with patient and family.

## 2020-07-08 NOTE — ED PROVIDER NOTE - PHYSICAL EXAMINATION
Const: Awake, alert and oriented. In no acute distress.   HEENT: NC/AT. Moist mucous membranes.  Eyes: No scleral icterus. EOMI.  Neck:. Soft and supple. Full ROM without pain.  Cardiac: +S1/S2. No murmurs. Peripheral pulses 2+ and symmetric. Pedal edema 2+ LLE > RLE chronic skin changes consistent with PVD   Resp: mild tachypneic, bibasilar crackles noted on auscultation   Abd: Soft, non-tender, non-distended. Normal bowel sounds in all 4 quadrants. No guarding or rebound.  Back: Spine midline and non-tender. No CVAT.  Skin: No rashes, abrasions or lacerations.  Lymph: No cervical lymphadenopathy.  Neuro: Awake, alert & oriented x 3. CN II-XII intact, Moves all extremities symmetrically.

## 2020-07-08 NOTE — ED CDU PROVIDER DISPOSITION NOTE - NSFOLLOWUPINSTRUCTIONS_ED_ALL_ED_FT
Dolores un seguimiento con [facility]: [place] - [facility], [place] [address]                                   [address] [facility] [address]                                                      TELÉFONO: [phone]                                                         FAX: [phone]    Dolor en el pecho  El dolor en el pecho puede ser causado por muchas condiciones diferentes que pueden o no ser peligrosas. Las causas incluyen acidez estomacal, infecciones pulmonares, ataque cardíaco, coágulo de amador en los pulmones, infecciones de la piel, tensión o daño en los músculos, cartílagos o huesos, etc. Además de sonam historia clínica y un examen físico, un electrocardiograma (ECG) u otras pruebas de laboratorio pueden se borrego realizado para determinar la causa de mann dolor en el pecho. Dolores un seguimiento con mann proveedor de atención primaria o con un cardiólogo según las instrucciones.    BUSQUE ATENCIÓN MÉDICA INMEDIATA SI TIENE ALGUNO DE LOS SÍNTOMAS SIGUIENTES: empeoramiento del dolor en el pecho, tos con amador, dolor inexplicable de espalda / jay / mandíbula, dolor abdominal intenso, mareos o aturdimiento, desmayos, falta de aliento, piel sudorosa o húmeda, vómitos, o latidos acelerados del corazón. Estos síntomas pueden representar un problema grave que es sonam emergencia. No espere para papo si los síntomas desaparecerán. Obtenga ayuda médica de inmediato. Llame al 911 y no conduzca hasta el hospital.

## 2020-07-08 NOTE — CONSULT NOTE ADULT - SUBJECTIVE AND OBJECTIVE BOX
[facility], THE HEART CENTER                                   [address] [place] [address]                                                      PHONE: [phone]                                                         FAX: [phone]  [email or web address]  ---------------------------------------------------------------------------------------------------------------------------------    Reason for Consult: Dyspnea     HPI:  ZANE VEGA is an 79y Male with history of SSS s/p PPM HFpEF severe MS s/p Mechanical MV CVA JACOB not on CPAP COVID 19 march 2020 anxiety who presents with dyspnea.  The patient had a large meal for dinner and couple of hour later, he developed mild dyspnea without orthopnea chest pain or PND.  Patient feeling much better currently.      PAST MEDICAL & SURGICAL HISTORY:  Afib  Respiratory insufficiency  CHF (congestive heart failure)  Mitral valve disease  HTN (hypertension)  H/O inguinal hernia: left  Chronic mitral valve disease: replaced      No Known Allergies      MEDICATIONS  (STANDING):  aspirin  chewable 81 milliGRAM(s) Oral daily  digoxin     Tablet 0.25 milliGRAM(s) Oral daily  gabapentin 300 milliGRAM(s) Oral daily  levothyroxine 50 MICROGram(s) Oral daily  lisinopril 5 milliGRAM(s) Oral daily  metoprolol tartrate 25 milliGRAM(s) Oral two times a day  warfarin 6 milliGRAM(s) Oral at bedtime    MEDICATIONS  (PRN):      Social History:  Cigarettes:       none              Alchohol:          none        Illicit Drug Abuse:  none     ROS: Negative other than as mentioned in HPI.    Vital Signs Last 24 Hrs  T(C): 37.1 (08 Jul 2020 01:36), Max: 37.1 (08 Jul 2020 01:36)  T(F): 98.8 (08 Jul 2020 01:36), Max: 98.8 (08 Jul 2020 01:36)  HR: 62 (08 Jul 2020 05:18) (60 - 87)  BP: 168/70 (08 Jul 2020 05:18) (168/70 - 186/73)  BP(mean): --  RR: 20 (08 Jul 2020 01:36) (20 - 20)  SpO2: 97% (08 Jul 2020 05:18) (97% - 98%)  ICU Vital Signs Last 24 Hrs  ZANE VEGA  I&O's Detail    07 Jul 2020 07:01  -  08 Jul 2020 07:00  --------------------------------------------------------  IN:  Total IN: 0 mL    OUT:    Voided: 800 mL  Total OUT: 800 mL    Total NET: -800 mL        I&O's Summary    07 Jul 2020 07:01  -  08 Jul 2020 07:00  --------------------------------------------------------  IN: 0 mL / OUT: 800 mL / NET: -800 mL      Drug Dosing Weight  ZANE VEGA      PHYSICAL EXAM:  General: Appears well developed, well nourished alert and cooperative.  HEENT: Head; normocephalic, atraumatic.  Eyes: Pupils reactive, cornea wnl.  Neck: Supple, no nodes adenopathy, no NVD or carotid bruit or thyromegaly.  CARDIOVASCULAR: Normal S1 and S2, 2/6 murmur, rub, gallop or lift.   LUNGS: No rales, rhonchi or wheeze. Normal breath sounds bilaterally.  ABDOMEN: Soft, nontender without mass or organomegaly. bowel sounds normoactive.  EXTREMITIES: No clubbing, cyanosis or edema. Distal pulses wnl.   SKIN: warm and dry with normal turgor.  NEURO: Alert/oriented x 3/normal motor exam. No pathologic reflexes.    PSYCH: normal affect.        LABS:                        12.1   7.80  )-----------( 193      ( 08 Jul 2020 02:45 )             39.2     07-08    136  |  98  |  16.0  ----------------------------<  88  4.2   |  24.0  |  1.04    Ca    9.1      08 Jul 2020 02:45    TPro  7.5  /  Alb  3.7  /  TBili  0.8  /  DBili  x   /  AST  25  /  ALT  15  /  AlkPhos  116  07-08    ZANE VEGA  CARDIAC MARKERS ( 08 Jul 2020 05:24 )  x     / <0.01 ng/mL / x     / x     / x      CARDIAC MARKERS ( 08 Jul 2020 02:45 )  x     / <0.01 ng/mL / x     / x     / x          PT/INR - ( 08 Jul 2020 02:45 )   PT: 26.5 sec;   INR: 2.38 ratio         PTT - ( 08 Jul 2020 02:45 )  PTT:45.4 sec      RADIOLOGY & ADDITIONAL STUDIES:    INTERPRETATION OF TELEMETRY (personally reviewed):    ECG: NSR without ischemic changes     ECHO: 2017 normal EF mechanical MV DDD lVH     STRESS TEST: none     CARDIAC CATHETERIZATION: none     Assessment and Plan:  In summary, ZANE VEGA is an 79y Male with past medical history significant for SSS s/p PPM HFpEF severe MS s/p Mechanical MV CVA JACOB not on CPAP COVID 19 march 2020 anxiety who presents with dyspnea.  The patient had a large meal for dinner and couple of hour later, he developed mild dyspnea without orthopnea chest pain or PND.  Patient feeling much better currently. CXR clear and trop negative x 2.  No evidence of heart failure and negative for AMI; patient sating very well with INR 2.38    Plan  1.  Check TTE to evaluate LV EF and mechanical MVR   2.  INR goal 2.5 to 3.5 with ASA therapy   3.  Continue current CV medication     Out patient ischemic work up such as cardiac PET CT at [facility] if the above work up negative

## 2020-07-08 NOTE — ED CDU PROVIDER INITIAL DAY NOTE - OBJECTIVE STATEMENT
Pt is a 80 y/o male with a hx of A fib, s/p MVR, CHF, PVD, s/p PPM, s/p Covid presenting to the ed with left sided chest pain and associated shortness of breath that started tonight. pt returned from rehab from [facility] at the end of may. pt cardiologist is dr ken. pt denies abd pain, nausea, vomiting, palpitations, fevers, back pain, dysuria

## 2020-07-08 NOTE — ED CDU PROVIDER DISPOSITION NOTE - CLINICAL COURSE
9y Male with history of SSS s/p PPM HFpEF severe MS s/p Mechanical MV CVA JACOB not on CPAP COVID 19 march 2020 anxiety who presents with dyspnea. Pt seen by [facility]; Echo ordered. Echo read, discussed with cardiology- states pt can follow up in office for NST. VSS at this time. SOB has improved. Return precautions provided. 9y Male with history of SSS s/p PPM HFpEF severe MS s/p Mechanical MV CVA JACOB not on CPAP COVID 19 march 2020 anxiety who presents with dyspnea. Pt seen by [facility]; Echo ordered. Echo read, discussed with cardiology- states pt can follow up in office for NST. VSS at this time. SOB has improved. Denies any CP, N/V/D, abd pain, fever, chills, HEREDIA. Feeling well at this time. Return precautions provided.

## 2020-07-08 NOTE — ED ADULT NURSE REASSESSMENT NOTE - NS ED NURSE REASSESS COMMENT FT1
Assumed care of pt at 0730.  At this time pt has no complaints of SOB or chest pain.  At this time pt is calm laying in stretcher.  VSS, A&Ox4, Hungarian speaking only.  Safety maintained. Seen by Cardiology, no new orders at this time.  pt educated on plan of care, pt able to successfully teach back plan of care to RN, RN will continue to reeducate pt during hospital stay.
Pt awake and alert, no respiratory distress VSS afebrile. Pt without c/o pain or discomfort at this time. Cardiac monitor in place. Resting comfortably at this time. Safety maintained.
Report given to Serene SALES.  Pt transferred to U 6 via stretcher. Pt at this time in stable condition,
Assumed care of the patient @1220 from nurse Meneses. Pt A&Ox4, Rwandan speaking. Cardiac monitor in place, VSS afebrile, Incontinent of urine, diaper changed, skin intact.   Patient in understanding of plan of care. Patient with no further questions for the RN. Resting in comfort. Call bell within reach and encouraged to use when assistance needed. Will continue to monitor.

## 2020-07-08 NOTE — ED CDU PROVIDER DISPOSITION NOTE - ATTENDING CONTRIBUTION TO CARE
Marco: I performed a face to face bedside interview with patient regarding history of present illness, review of symptoms and past medical history. I completed an independent physical exam.  I have discussed patient's plan of care with advanced care provider.   I agree with note as stated above including HISTORY OF PRESENT ILLNESS, HIV, PAST MEDICAL/SURGICAL/FAMILY/SOCIAL HISTORY, ALLERGIES AND HOME MEDICATIONS, REVIEW OF SYSTEMS, PHYSICAL EXAM, MEDICAL DECISION MAKING and any PROGRESS NOTES during the time I functioned as the attending physician for this patient  unless otherwise noted. My brief assessment is as follows: pt with SOB, seen by Sergo lucero, received lasix. TTE results reviewed with sergo lucero, pt to follow with outpt cards.

## 2020-07-21 ENCOUNTER — APPOINTMENT (OUTPATIENT)
Dept: VASCULAR SURGERY | Facility: CLINIC | Age: 80
End: 2020-07-21
Payer: MEDICARE

## 2020-07-21 PROCEDURE — 99213 OFFICE O/P EST LOW 20 MIN: CPT

## 2020-07-21 PROCEDURE — 93978 VASCULAR STUDY: CPT

## 2020-07-21 PROCEDURE — 93970 EXTREMITY STUDY: CPT

## 2020-08-24 ENCOUNTER — FORM ENCOUNTER (OUTPATIENT)
Age: 80
End: 2020-08-24

## 2020-08-27 DIAGNOSIS — Z01.818 ENCOUNTER FOR OTHER PREPROCEDURAL EXAMINATION: ICD-10-CM

## 2020-08-30 ENCOUNTER — APPOINTMENT (OUTPATIENT)
Dept: DISASTER EMERGENCY | Facility: CLINIC | Age: 80
End: 2020-08-30

## 2020-09-05 ENCOUNTER — APPOINTMENT (OUTPATIENT)
Dept: DISASTER EMERGENCY | Facility: CLINIC | Age: 80
End: 2020-09-05

## 2020-09-06 LAB — SARS-COV-2 N GENE NPH QL NAA+PROBE: NOT DETECTED

## 2020-09-08 DIAGNOSIS — Z98.890 OTHER SPECIFIED POSTPROCEDURAL STATES: ICD-10-CM

## 2020-09-09 ENCOUNTER — APPOINTMENT (OUTPATIENT)
Dept: VASCULAR SURGERY | Facility: CLINIC | Age: 80
End: 2020-09-09
Payer: MEDICARE

## 2020-09-09 VITALS
SYSTOLIC BLOOD PRESSURE: 162 MMHG | OXYGEN SATURATION: 95 % | DIASTOLIC BLOOD PRESSURE: 77 MMHG | HEART RATE: 66 BPM | HEIGHT: 64 IN | TEMPERATURE: 98.6 F

## 2020-09-09 PROCEDURE — 36475 ENDOVENOUS RF 1ST VEIN: CPT

## 2020-09-11 ENCOUNTER — APPOINTMENT (OUTPATIENT)
Dept: VASCULAR SURGERY | Facility: CLINIC | Age: 80
End: 2020-09-11
Payer: MEDICARE

## 2020-09-11 PROCEDURE — 93971 EXTREMITY STUDY: CPT

## 2020-10-12 ENCOUNTER — APPOINTMENT (OUTPATIENT)
Dept: VASCULAR SURGERY | Facility: CLINIC | Age: 80
End: 2020-10-12
Payer: MEDICARE

## 2020-10-12 VITALS
HEIGHT: 64 IN | HEART RATE: 63 BPM | TEMPERATURE: 98.6 F | SYSTOLIC BLOOD PRESSURE: 182 MMHG | OXYGEN SATURATION: 97 % | DIASTOLIC BLOOD PRESSURE: 73 MMHG

## 2020-10-12 DIAGNOSIS — I72.2 ANEURYSM OF RENAL ARTERY: ICD-10-CM

## 2020-10-12 PROCEDURE — 93978 VASCULAR STUDY: CPT

## 2020-10-12 PROCEDURE — 99214 OFFICE O/P EST MOD 30 MIN: CPT

## 2020-10-30 ENCOUNTER — EMERGENCY (EMERGENCY)
Facility: HOSPITAL | Age: 80
LOS: 1 days | Discharge: HOME | End: 2020-10-30
Attending: EMERGENCY MEDICINE
Payer: MEDICARE

## 2020-10-30 VITALS
SYSTOLIC BLOOD PRESSURE: 190 MMHG | DIASTOLIC BLOOD PRESSURE: 85 MMHG | RESPIRATION RATE: 16 BRPM | HEIGHT: 65 IN | OXYGEN SATURATION: 99 % | TEMPERATURE: 98 F | HEART RATE: 79 BPM

## 2020-10-30 VITALS
DIASTOLIC BLOOD PRESSURE: 65 MMHG | RESPIRATION RATE: 19 BRPM | OXYGEN SATURATION: 98 % | SYSTOLIC BLOOD PRESSURE: 165 MMHG | TEMPERATURE: 98 F | HEART RATE: 85 BPM

## 2020-10-30 DIAGNOSIS — I05.9 RHEUMATIC MITRAL VALVE DISEASE, UNSPECIFIED: Chronic | ICD-10-CM

## 2020-10-30 DIAGNOSIS — Z87.19 PERSONAL HISTORY OF OTHER DISEASES OF THE DIGESTIVE SYSTEM: Chronic | ICD-10-CM

## 2020-10-30 LAB
ALBUMIN SERPL ELPH-MCNC: 4.1 G/DL — SIGNIFICANT CHANGE UP (ref 3.3–5.2)
ALP SERPL-CCNC: 137 U/L — HIGH (ref 40–120)
ALT FLD-CCNC: 17 U/L — SIGNIFICANT CHANGE UP
ANION GAP SERPL CALC-SCNC: 11 MMOL/L — SIGNIFICANT CHANGE UP (ref 5–17)
APPEARANCE UR: CLEAR — SIGNIFICANT CHANGE UP
APTT BLD: 51.9 SEC — HIGH (ref 27.5–35.5)
AST SERPL-CCNC: 32 U/L — SIGNIFICANT CHANGE UP
BASOPHILS # BLD AUTO: 0.05 K/UL — SIGNIFICANT CHANGE UP (ref 0–0.2)
BASOPHILS NFR BLD AUTO: 0.8 % — SIGNIFICANT CHANGE UP (ref 0–2)
BILIRUB SERPL-MCNC: 1.3 MG/DL — SIGNIFICANT CHANGE UP (ref 0.4–2)
BILIRUB UR-MCNC: NEGATIVE — SIGNIFICANT CHANGE UP
BLD GP AB SCN SERPL QL: SIGNIFICANT CHANGE UP
BUN SERPL-MCNC: 19 MG/DL — SIGNIFICANT CHANGE UP (ref 8–20)
CALCIUM SERPL-MCNC: 9.8 MG/DL — SIGNIFICANT CHANGE UP (ref 8.6–10.2)
CHLORIDE SERPL-SCNC: 101 MMOL/L — SIGNIFICANT CHANGE UP (ref 98–107)
CO2 SERPL-SCNC: 27 MMOL/L — SIGNIFICANT CHANGE UP (ref 22–29)
COLOR SPEC: YELLOW — SIGNIFICANT CHANGE UP
CREAT SERPL-MCNC: 0.88 MG/DL — SIGNIFICANT CHANGE UP (ref 0.5–1.3)
DIFF PNL FLD: ABNORMAL
DIGOXIN SERPL-MCNC: <0.4 NG/ML — LOW (ref 0.8–2)
EOSINOPHIL # BLD AUTO: 0.07 K/UL — SIGNIFICANT CHANGE UP (ref 0–0.5)
EOSINOPHIL NFR BLD AUTO: 1.1 % — SIGNIFICANT CHANGE UP (ref 0–6)
GLUCOSE SERPL-MCNC: 78 MG/DL — SIGNIFICANT CHANGE UP (ref 70–99)
GLUCOSE UR QL: NEGATIVE MG/DL — SIGNIFICANT CHANGE UP
HCT VFR BLD CALC: 43 % — SIGNIFICANT CHANGE UP (ref 39–50)
HGB BLD-MCNC: 13.4 G/DL — SIGNIFICANT CHANGE UP (ref 13–17)
IMM GRANULOCYTES NFR BLD AUTO: 0.3 % — SIGNIFICANT CHANGE UP (ref 0–1.5)
INR BLD: 3.35 RATIO — HIGH (ref 0.88–1.16)
KETONES UR-MCNC: NEGATIVE — SIGNIFICANT CHANGE UP
LEUKOCYTE ESTERASE UR-ACNC: NEGATIVE — SIGNIFICANT CHANGE UP
LIDOCAIN IGE QN: 32 U/L — SIGNIFICANT CHANGE UP (ref 22–51)
LYMPHOCYTES # BLD AUTO: 0.96 K/UL — LOW (ref 1–3.3)
LYMPHOCYTES # BLD AUTO: 15.2 % — SIGNIFICANT CHANGE UP (ref 13–44)
MCHC RBC-ENTMCNC: 26 PG — LOW (ref 27–34)
MCHC RBC-ENTMCNC: 31.2 GM/DL — LOW (ref 32–36)
MCV RBC AUTO: 83.5 FL — SIGNIFICANT CHANGE UP (ref 80–100)
MONOCYTES # BLD AUTO: 0.6 K/UL — SIGNIFICANT CHANGE UP (ref 0–0.9)
MONOCYTES NFR BLD AUTO: 9.5 % — SIGNIFICANT CHANGE UP (ref 2–14)
NEUTROPHILS # BLD AUTO: 4.6 K/UL — SIGNIFICANT CHANGE UP (ref 1.8–7.4)
NEUTROPHILS NFR BLD AUTO: 73.1 % — SIGNIFICANT CHANGE UP (ref 43–77)
NITRITE UR-MCNC: NEGATIVE — SIGNIFICANT CHANGE UP
PH UR: 7 — SIGNIFICANT CHANGE UP (ref 5–8)
PLATELET # BLD AUTO: 162 K/UL — SIGNIFICANT CHANGE UP (ref 150–400)
POTASSIUM SERPL-MCNC: 4.6 MMOL/L — SIGNIFICANT CHANGE UP (ref 3.5–5.3)
POTASSIUM SERPL-SCNC: 4.6 MMOL/L — SIGNIFICANT CHANGE UP (ref 3.5–5.3)
PROT SERPL-MCNC: 7.8 G/DL — SIGNIFICANT CHANGE UP (ref 6.6–8.7)
PROT UR-MCNC: 100 MG/DL
PROTHROM AB SERPL-ACNC: 36.7 SEC — HIGH (ref 10.6–13.6)
RBC # BLD: 5.15 M/UL — SIGNIFICANT CHANGE UP (ref 4.2–5.8)
RBC # FLD: 17.7 % — HIGH (ref 10.3–14.5)
RBC CASTS # UR COMP ASSIST: ABNORMAL /HPF (ref 0–4)
SODIUM SERPL-SCNC: 139 MMOL/L — SIGNIFICANT CHANGE UP (ref 135–145)
SP GR SPEC: 1.01 — SIGNIFICANT CHANGE UP (ref 1.01–1.02)
UROBILINOGEN FLD QL: NEGATIVE MG/DL — SIGNIFICANT CHANGE UP
WBC # BLD: 6.3 K/UL — SIGNIFICANT CHANGE UP (ref 3.8–10.5)
WBC # FLD AUTO: 6.3 K/UL — SIGNIFICANT CHANGE UP (ref 3.8–10.5)
WBC UR QL: SIGNIFICANT CHANGE UP

## 2020-10-30 PROCEDURE — 80162 ASSAY OF DIGOXIN TOTAL: CPT

## 2020-10-30 PROCEDURE — 71045 X-RAY EXAM CHEST 1 VIEW: CPT | Mod: 26

## 2020-10-30 PROCEDURE — 99285 EMERGENCY DEPT VISIT HI MDM: CPT

## 2020-10-30 PROCEDURE — 87086 URINE CULTURE/COLONY COUNT: CPT

## 2020-10-30 PROCEDURE — 71045 X-RAY EXAM CHEST 1 VIEW: CPT

## 2020-10-30 PROCEDURE — 85730 THROMBOPLASTIN TIME PARTIAL: CPT

## 2020-10-30 PROCEDURE — 86901 BLOOD TYPING SEROLOGIC RH(D): CPT

## 2020-10-30 PROCEDURE — 83690 ASSAY OF LIPASE: CPT

## 2020-10-30 PROCEDURE — 71275 CT ANGIOGRAPHY CHEST: CPT

## 2020-10-30 PROCEDURE — 96374 THER/PROPH/DIAG INJ IV PUSH: CPT | Mod: XU

## 2020-10-30 PROCEDURE — 80053 COMPREHEN METABOLIC PANEL: CPT

## 2020-10-30 PROCEDURE — 74174 CTA ABD&PLVS W/CONTRAST: CPT

## 2020-10-30 PROCEDURE — 86900 BLOOD TYPING SEROLOGIC ABO: CPT

## 2020-10-30 PROCEDURE — 85025 COMPLETE CBC W/AUTO DIFF WBC: CPT

## 2020-10-30 PROCEDURE — 86850 RBC ANTIBODY SCREEN: CPT

## 2020-10-30 PROCEDURE — 36415 COLL VENOUS BLD VENIPUNCTURE: CPT

## 2020-10-30 PROCEDURE — 81001 URINALYSIS AUTO W/SCOPE: CPT

## 2020-10-30 PROCEDURE — 74174 CTA ABD&PLVS W/CONTRAST: CPT | Mod: 26

## 2020-10-30 PROCEDURE — 85610 PROTHROMBIN TIME: CPT

## 2020-10-30 PROCEDURE — 99284 EMERGENCY DEPT VISIT MOD MDM: CPT | Mod: 25

## 2020-10-30 PROCEDURE — 71275 CT ANGIOGRAPHY CHEST: CPT | Mod: 26

## 2020-10-30 RX ORDER — KETOROLAC TROMETHAMINE 30 MG/ML
15 SYRINGE (ML) INJECTION ONCE
Refills: 0 | Status: DISCONTINUED | OUTPATIENT
Start: 2020-10-30 | End: 2020-10-30

## 2020-10-30 RX ADMIN — Medication 15 MILLIGRAM(S): at 18:54

## 2020-10-30 NOTE — ED PROVIDER NOTE - ATTENDING CONTRIBUTION TO CARE
I performed a face to face history and physical exam of the patient and discussed their management with the student/resident/ACP. I reviewed the student/resident/ACP's note and agree with the documented findings and plan of care.    pt states that since last night he has had worsening R flank and R-sided abdominal pain associated with sob as well as pain exac by deep breathing. Pt also feels like his abdomen is distended today. no n/v. no fever/chills. no cp. no other complaints.    physical - rrr. ctab. abd soft, R-sided ttp no rebound. no guarding. +ecchymosis to R-side of abdomen. no rash. no edema.    plan - labs and imaging.  priority ct ordered for evaluation of aorta given ecchymosis and significant R-sided abd ttp. Pt signed out to Dr. De Luna pending labs and reassessment.

## 2020-10-30 NOTE — ED PROVIDER NOTE - NS ED ROS FT
Constitutional: no fever, no chills  Eyes: no vision changes  ENT: no nasal congestion, no sore throat  CV: no chest pain  Resp: no cough, no shortness of breath  GI: +flank pain, no vomiting, no diarrhea  : no dysuria  MSK: no joint pain  Skin: no rash  Neuro: no headache, no weakness, no paresthesias

## 2020-10-30 NOTE — ED PROVIDER NOTE - CLINICAL SUMMARY MEDICAL DECISION MAKING FREE TEXT BOX
80y M w/ hx HTN, Afib, MVR, CHF, presenting for 1 day of intermittent R flank pain.  Pt appears well, in no acute distress.  Will check labs, urine, EKG, CXR, CT abd/pelvis.  Treat with toradol, reassess. 80y M w/ hx HTN, Afib, MVR, CHF, presenting for 1 day of intermittent R flank pain.  Pt appears well, in no acute distress.  Will check labs, urine, EKG, CXR, CTA chest/abd/pelvis.  Treat with toradol, reassess. 80y M w/ hx HTN, Afib, MVR, CHF, presenting for 1 day of intermittent R flank pain.  Pt appears well, in no acute distress.  Will check labs, urine, EKG, CTA chest/abd/pelvis.  Treat with toradol, reassess.

## 2020-10-30 NOTE — ED PROVIDER NOTE - OBJECTIVE STATEMENT
80y M w/ hx HTN, Afib on coumadin s/p PPM, Mechanical Mitral valve, chronic diastolic HF, CVA, hernia repair, presenting for R flank pain since yesterday.  Pain is described as intermittent, worse with deep breath and movement.  Took tylenol yesterday with some improvement.  Denies chest pain, n/v/d, black/bloody stools, urinary complaints, fever, chills.  Had a hard bowel movement earlier today and then a normal bowel movement later.

## 2020-10-30 NOTE — ED PROVIDER NOTE - PHYSICAL EXAMINATION
Constitutional: Awake, alert, in no acute distress  Eyes: no scleral icterus  HENT: normocephalic, atraumatic, moist oral mucosa  CV: RRR, no murmur  Pulm: non-labored respirations, CTAB  Abdomen: soft, obese, +tenderness of R flank area, no CVAT  Extremities: +chronic lower extremity venous stasis changes and edema, no deformity  Skin: no rash, no jaundice  Neuro: AAOx3, moving all extremities equally

## 2020-10-30 NOTE — ED ADULT TRIAGE NOTE - CHIEF COMPLAINT QUOTE
Pt arrives with R flank pain and distended abdomen which he states started yesterday. Pt is noted to be hypertensive but states has not taken his BP meds in 3 days. Pt denies urinary/bowel dysfunction.

## 2020-10-30 NOTE — ED ADULT NURSE NOTE - CHPI ED NUR SYMPTOMS NEG
no nausea/no abdominal distension/no blood in stool/no hematuria/no fever/no dysuria/no diarrhea/no chills

## 2020-10-30 NOTE — ED PROVIDER NOTE - PATIENT PORTAL LINK FT
You can access the FollowMyHealth Patient Portal offered by [facility] by registering at the following website: [email or web address]. By joining [employer]’s FollowMyHealth portal, you will also be able to view your health information using other applications (apps) compatible with our system.

## 2020-10-30 NOTE — ED PROVIDER NOTE - PROGRESS NOTE DETAILS
Amanda: Given the significant and immediate threats to this patient based on initial presentation, the benefits of emergency contrast-enhanced CT imaging without obtaining GFR/creatinine serum level results greatly outweight the potential risk of harm due to contrast-induced nephropathy. Received signout regarding patient. Patient feels well, pain improved with toradol. Workup unremarkable for acute pathology. Conversation had with patient regarding results of labwork and imaging. Patient agrees with plan for discharge at this time. Patient agrees to comply with follow up with PCP. Return to ED precautions and discharge instructions given to patient.

## 2020-10-30 NOTE — ED PROVIDER NOTE - CARE PLAN
Principal Discharge DX:	Flank pain  Secondary Diagnosis:	Afib  Secondary Diagnosis:	Abdominal pain

## 2020-10-30 NOTE — ED ADULT NURSE NOTE - OBJECTIVE STATEMENT
left flank pain that started yesterday, worsening since beginning, non radiating, denies any other symptoms.

## 2020-10-30 NOTE — ED PROVIDER NOTE - PMH
Afib    CHF (congestive heart failure)    CVA (cerebral vascular accident)    HTN (hypertension)    Mitral valve disease    Respiratory insufficiency

## 2020-11-01 LAB
CULTURE RESULTS: SIGNIFICANT CHANGE UP
SPECIMEN SOURCE: SIGNIFICANT CHANGE UP

## 2021-01-01 ENCOUNTER — TRANSCRIPTION ENCOUNTER (OUTPATIENT)
Age: 81
End: 2021-01-01

## 2021-01-01 ENCOUNTER — EMERGENCY (EMERGENCY)
Facility: HOSPITAL | Age: 81
LOS: 1 days | Discharge: HOME | End: 2021-01-01
Attending: EMERGENCY MEDICINE
Payer: MEDICARE

## 2021-01-01 ENCOUNTER — APPOINTMENT (OUTPATIENT)
Dept: VASCULAR SURGERY | Facility: CLINIC | Age: 81
End: 2021-01-01
Payer: MEDICARE

## 2021-01-01 ENCOUNTER — APPOINTMENT (OUTPATIENT)
Dept: VASCULAR SURGERY | Facility: CLINIC | Age: 81
End: 2021-01-01

## 2021-01-01 ENCOUNTER — APPOINTMENT (OUTPATIENT)
Dept: CT IMAGING | Facility: CLINIC | Age: 81
End: 2021-01-01

## 2021-01-01 ENCOUNTER — APPOINTMENT (OUTPATIENT)
Dept: CT IMAGING | Facility: CLINIC | Age: 81
End: 2021-01-01
Payer: MEDICARE

## 2021-01-01 ENCOUNTER — OUTPATIENT (OUTPATIENT)
Dept: OUTPATIENT SERVICES | Facility: HOSPITAL | Age: 81
LOS: 1 days | End: 2021-01-01
Payer: MEDICARE

## 2021-01-01 ENCOUNTER — EMERGENCY (EMERGENCY)
Facility: HOSPITAL | Age: 81
LOS: 1 days | Discharge: HOME | End: 2021-01-01
Attending: STUDENT IN AN ORGANIZED HEALTH CARE EDUCATION/TRAINING PROGRAM
Payer: MEDICARE

## 2021-01-01 ENCOUNTER — RESULT REVIEW (OUTPATIENT)
Age: 81
End: 2021-01-01

## 2021-01-01 ENCOUNTER — APPOINTMENT (OUTPATIENT)
Dept: NEUROSURGERY | Facility: CLINIC | Age: 81
End: 2021-01-01
Payer: MEDICARE

## 2021-01-01 ENCOUNTER — OUTPATIENT (OUTPATIENT)
Dept: INPATIENT UNIT | Facility: HOSPITAL | Age: 81
LOS: 1 days | End: 2021-01-01
Payer: MEDICARE

## 2021-01-01 ENCOUNTER — APPOINTMENT (OUTPATIENT)
Dept: NEUROSURGERY | Facility: CLINIC | Age: 81
End: 2021-01-01

## 2021-01-01 ENCOUNTER — APPOINTMENT (OUTPATIENT)
Dept: DISASTER EMERGENCY | Facility: HOSPITAL | Age: 81
End: 2021-01-01

## 2021-01-01 VITALS
HEART RATE: 68 BPM | OXYGEN SATURATION: 99 % | SYSTOLIC BLOOD PRESSURE: 154 MMHG | RESPIRATION RATE: 16 BRPM | HEIGHT: 65 IN | TEMPERATURE: 98 F | WEIGHT: 184.97 LBS | DIASTOLIC BLOOD PRESSURE: 77 MMHG

## 2021-01-01 VITALS
DIASTOLIC BLOOD PRESSURE: 66 MMHG | TEMPERATURE: 97.6 F | HEART RATE: 58 BPM | OXYGEN SATURATION: 98 % | SYSTOLIC BLOOD PRESSURE: 145 MMHG

## 2021-01-01 VITALS
RESPIRATION RATE: 18 BRPM | OXYGEN SATURATION: 96 % | TEMPERATURE: 98 F | WEIGHT: 179.9 LBS | HEIGHT: 65 IN | DIASTOLIC BLOOD PRESSURE: 70 MMHG | SYSTOLIC BLOOD PRESSURE: 189 MMHG | HEART RATE: 81 BPM

## 2021-01-01 VITALS
HEART RATE: 66 BPM | TEMPERATURE: 98 F | DIASTOLIC BLOOD PRESSURE: 69 MMHG | SYSTOLIC BLOOD PRESSURE: 171 MMHG | WEIGHT: 189.6 LBS | OXYGEN SATURATION: 96 % | HEIGHT: 66.14 IN | RESPIRATION RATE: 18 BRPM

## 2021-01-01 VITALS
HEART RATE: 80 BPM | TEMPERATURE: 97 F | WEIGHT: 179.9 LBS | SYSTOLIC BLOOD PRESSURE: 159 MMHG | OXYGEN SATURATION: 99 % | HEIGHT: 65 IN | DIASTOLIC BLOOD PRESSURE: 56 MMHG | RESPIRATION RATE: 16 BRPM

## 2021-01-01 VITALS
RESPIRATION RATE: 18 BRPM | DIASTOLIC BLOOD PRESSURE: 66 MMHG | OXYGEN SATURATION: 96 % | HEART RATE: 66 BPM | SYSTOLIC BLOOD PRESSURE: 158 MMHG | TEMPERATURE: 98 F

## 2021-01-01 VITALS
TEMPERATURE: 98.7 F | OXYGEN SATURATION: 95 % | DIASTOLIC BLOOD PRESSURE: 68 MMHG | SYSTOLIC BLOOD PRESSURE: 135 MMHG | HEIGHT: 64 IN | HEART RATE: 65 BPM

## 2021-01-01 VITALS
SYSTOLIC BLOOD PRESSURE: 148 MMHG | DIASTOLIC BLOOD PRESSURE: 62 MMHG | HEART RATE: 63 BPM | RESPIRATION RATE: 16 BRPM | TEMPERATURE: 98 F | OXYGEN SATURATION: 97 %

## 2021-01-01 VITALS
OXYGEN SATURATION: 97 % | DIASTOLIC BLOOD PRESSURE: 72 MMHG | HEART RATE: 62 BPM | SYSTOLIC BLOOD PRESSURE: 176 MMHG | RESPIRATION RATE: 18 BRPM | TEMPERATURE: 98 F

## 2021-01-01 DIAGNOSIS — Z87.19 PERSONAL HISTORY OF OTHER DISEASES OF THE DIGESTIVE SYSTEM: Chronic | ICD-10-CM

## 2021-01-01 DIAGNOSIS — D35.2 BENIGN NEOPLASM OF PITUITARY GLAND: ICD-10-CM

## 2021-01-01 DIAGNOSIS — I05.9 RHEUMATIC MITRAL VALVE DISEASE, UNSPECIFIED: Chronic | ICD-10-CM

## 2021-01-01 DIAGNOSIS — U07.1 COVID-19: ICD-10-CM

## 2021-01-01 DIAGNOSIS — I63.9 CEREBRAL INFARCTION, UNSPECIFIED: ICD-10-CM

## 2021-01-01 DIAGNOSIS — I73.9 PERIPHERAL VASCULAR DISEASE, UNSPECIFIED: ICD-10-CM

## 2021-01-01 DIAGNOSIS — D49.7 NEOPLASM OF UNSPECIFIED BEHAVIOR OF ENDOCRINE GLANDS AND OTHER PARTS OF NERVOUS SYSTEM: ICD-10-CM

## 2021-01-01 DIAGNOSIS — Z00.8 ENCOUNTER FOR OTHER GENERAL EXAMINATION: ICD-10-CM

## 2021-01-01 DIAGNOSIS — I71.4 ABDOMINAL AORTIC ANEURYSM, WITHOUT RUPTURE: ICD-10-CM

## 2021-01-01 LAB
ALBUMIN SERPL ELPH-MCNC: 4 G/DL — SIGNIFICANT CHANGE UP (ref 3.3–5.2)
ALBUMIN SERPL ELPH-MCNC: 4 G/DL — SIGNIFICANT CHANGE UP (ref 3.3–5.2)
ALBUMIN SERPL ELPH-MCNC: 4.4 G/DL — SIGNIFICANT CHANGE UP (ref 3.3–5.2)
ALP SERPL-CCNC: 104 U/L — SIGNIFICANT CHANGE UP (ref 40–120)
ALP SERPL-CCNC: 112 U/L — SIGNIFICANT CHANGE UP (ref 40–120)
ALP SERPL-CCNC: 168 U/L — HIGH (ref 40–120)
ALT FLD-CCNC: 17 U/L — SIGNIFICANT CHANGE UP
ALT FLD-CCNC: 24 U/L — SIGNIFICANT CHANGE UP
ALT FLD-CCNC: 25 U/L — SIGNIFICANT CHANGE UP
ANION GAP SERPL CALC-SCNC: 13 MMOL/L — SIGNIFICANT CHANGE UP (ref 5–17)
ANION GAP SERPL CALC-SCNC: 13 MMOL/L — SIGNIFICANT CHANGE UP (ref 5–17)
ANION GAP SERPL CALC-SCNC: 8 MMOL/L — SIGNIFICANT CHANGE UP (ref 5–17)
APTT BLD: 52.5 SEC — HIGH (ref 27.5–35.5)
APTT BLD: 56.1 SEC — HIGH (ref 27.5–35.5)
AST SERPL-CCNC: 25 U/L — SIGNIFICANT CHANGE UP
AST SERPL-CCNC: 37 U/L — SIGNIFICANT CHANGE UP
AST SERPL-CCNC: 41 U/L — HIGH
BASOPHILS # BLD AUTO: 0.04 K/UL — SIGNIFICANT CHANGE UP (ref 0–0.2)
BASOPHILS # BLD AUTO: 0.05 K/UL — SIGNIFICANT CHANGE UP (ref 0–0.2)
BASOPHILS # BLD AUTO: 0.05 K/UL — SIGNIFICANT CHANGE UP (ref 0–0.2)
BASOPHILS NFR BLD AUTO: 0.7 % — SIGNIFICANT CHANGE UP (ref 0–2)
BASOPHILS NFR BLD AUTO: 0.7 % — SIGNIFICANT CHANGE UP (ref 0–2)
BASOPHILS NFR BLD AUTO: 0.8 % — SIGNIFICANT CHANGE UP (ref 0–2)
BILIRUB SERPL-MCNC: 0.8 MG/DL — SIGNIFICANT CHANGE UP (ref 0.4–2)
BILIRUB SERPL-MCNC: 0.9 MG/DL — SIGNIFICANT CHANGE UP (ref 0.4–2)
BILIRUB SERPL-MCNC: 1.3 MG/DL — SIGNIFICANT CHANGE UP (ref 0.4–2)
BUN SERPL-MCNC: 18 MG/DL — SIGNIFICANT CHANGE UP (ref 8–20)
BUN SERPL-MCNC: 23.5 MG/DL — HIGH (ref 8–20)
BUN SERPL-MCNC: 36.6 MG/DL — HIGH (ref 8–20)
CALCIUM SERPL-MCNC: 8.7 MG/DL — SIGNIFICANT CHANGE UP (ref 8.6–10.2)
CALCIUM SERPL-MCNC: 9 MG/DL — SIGNIFICANT CHANGE UP (ref 8.6–10.2)
CALCIUM SERPL-MCNC: 9 MG/DL — SIGNIFICANT CHANGE UP (ref 8.6–10.2)
CHLORIDE SERPL-SCNC: 100 MMOL/L — SIGNIFICANT CHANGE UP (ref 98–107)
CHLORIDE SERPL-SCNC: 103 MMOL/L — SIGNIFICANT CHANGE UP (ref 98–107)
CHLORIDE SERPL-SCNC: 97 MMOL/L — LOW (ref 98–107)
CO2 SERPL-SCNC: 20 MMOL/L — LOW (ref 22–29)
CO2 SERPL-SCNC: 22 MMOL/L — SIGNIFICANT CHANGE UP (ref 22–29)
CO2 SERPL-SCNC: 26 MMOL/L — SIGNIFICANT CHANGE UP (ref 22–29)
CREAT SERPL-MCNC: 1.04 MG/DL — SIGNIFICANT CHANGE UP (ref 0.5–1.3)
CREAT SERPL-MCNC: 1.17 MG/DL — SIGNIFICANT CHANGE UP (ref 0.5–1.3)
CREAT SERPL-MCNC: 1.17 MG/DL — SIGNIFICANT CHANGE UP (ref 0.5–1.3)
EOSINOPHIL # BLD AUTO: 0.02 K/UL — SIGNIFICANT CHANGE UP (ref 0–0.5)
EOSINOPHIL # BLD AUTO: 0.06 K/UL — SIGNIFICANT CHANGE UP (ref 0–0.5)
EOSINOPHIL # BLD AUTO: 0.1 K/UL — SIGNIFICANT CHANGE UP (ref 0–0.5)
EOSINOPHIL NFR BLD AUTO: 0.3 % — SIGNIFICANT CHANGE UP (ref 0–6)
EOSINOPHIL NFR BLD AUTO: 1 % — SIGNIFICANT CHANGE UP (ref 0–6)
EOSINOPHIL NFR BLD AUTO: 1.4 % — SIGNIFICANT CHANGE UP (ref 0–6)
GLUCOSE SERPL-MCNC: 105 MG/DL — HIGH (ref 70–99)
GLUCOSE SERPL-MCNC: 92 MG/DL — SIGNIFICANT CHANGE UP (ref 70–99)
GLUCOSE SERPL-MCNC: 93 MG/DL — SIGNIFICANT CHANGE UP (ref 70–99)
HCT VFR BLD CALC: 38.6 % — LOW (ref 39–50)
HCT VFR BLD CALC: 39 % — SIGNIFICANT CHANGE UP (ref 39–50)
HCT VFR BLD CALC: 41.7 % — SIGNIFICANT CHANGE UP (ref 39–50)
HGB BLD-MCNC: 12 G/DL — LOW (ref 13–17)
HGB BLD-MCNC: 12.1 G/DL — LOW (ref 13–17)
HGB BLD-MCNC: 13.3 G/DL — SIGNIFICANT CHANGE UP (ref 13–17)
IMM GRANULOCYTES NFR BLD AUTO: 0.3 % — SIGNIFICANT CHANGE UP (ref 0–1.5)
INR BLD: 3.21 RATIO — HIGH (ref 0.88–1.16)
INR BLD: 3.76 RATIO — HIGH (ref 0.88–1.16)
LYMPHOCYTES # BLD AUTO: 0.99 K/UL — LOW (ref 1–3.3)
LYMPHOCYTES # BLD AUTO: 1.12 K/UL — SIGNIFICANT CHANGE UP (ref 1–3.3)
LYMPHOCYTES # BLD AUTO: 1.23 K/UL — SIGNIFICANT CHANGE UP (ref 1–3.3)
LYMPHOCYTES # BLD AUTO: 15.8 % — SIGNIFICANT CHANGE UP (ref 13–44)
LYMPHOCYTES # BLD AUTO: 16 % — SIGNIFICANT CHANGE UP (ref 13–44)
LYMPHOCYTES # BLD AUTO: 20.3 % — SIGNIFICANT CHANGE UP (ref 13–44)
MAGNESIUM SERPL-MCNC: 2.2 MG/DL — SIGNIFICANT CHANGE UP (ref 1.6–2.6)
MCHC RBC-ENTMCNC: 26.1 PG — LOW (ref 27–34)
MCHC RBC-ENTMCNC: 26.7 PG — LOW (ref 27–34)
MCHC RBC-ENTMCNC: 27.7 PG — SIGNIFICANT CHANGE UP (ref 27–34)
MCHC RBC-ENTMCNC: 30.8 GM/DL — LOW (ref 32–36)
MCHC RBC-ENTMCNC: 31.3 GM/DL — LOW (ref 32–36)
MCHC RBC-ENTMCNC: 31.9 GM/DL — LOW (ref 32–36)
MCV RBC AUTO: 83.2 FL — SIGNIFICANT CHANGE UP (ref 80–100)
MCV RBC AUTO: 86.9 FL — SIGNIFICANT CHANGE UP (ref 80–100)
MCV RBC AUTO: 86.9 FL — SIGNIFICANT CHANGE UP (ref 80–100)
MONOCYTES # BLD AUTO: 0.43 K/UL — SIGNIFICANT CHANGE UP (ref 0–0.9)
MONOCYTES # BLD AUTO: 0.59 K/UL — SIGNIFICANT CHANGE UP (ref 0–0.9)
MONOCYTES # BLD AUTO: 0.66 K/UL — SIGNIFICANT CHANGE UP (ref 0–0.9)
MONOCYTES NFR BLD AUTO: 6.8 % — SIGNIFICANT CHANGE UP (ref 2–14)
MONOCYTES NFR BLD AUTO: 9.4 % — SIGNIFICANT CHANGE UP (ref 2–14)
MONOCYTES NFR BLD AUTO: 9.8 % — SIGNIFICANT CHANGE UP (ref 2–14)
NEUTROPHILS # BLD AUTO: 4.11 K/UL — SIGNIFICANT CHANGE UP (ref 1.8–7.4)
NEUTROPHILS # BLD AUTO: 4.77 K/UL — SIGNIFICANT CHANGE UP (ref 1.8–7.4)
NEUTROPHILS # BLD AUTO: 5.07 K/UL — SIGNIFICANT CHANGE UP (ref 1.8–7.4)
NEUTROPHILS NFR BLD AUTO: 67.9 % — SIGNIFICANT CHANGE UP (ref 43–77)
NEUTROPHILS NFR BLD AUTO: 72.2 % — SIGNIFICANT CHANGE UP (ref 43–77)
NEUTROPHILS NFR BLD AUTO: 76 % — SIGNIFICANT CHANGE UP (ref 43–77)
PLATELET # BLD AUTO: 150 K/UL — SIGNIFICANT CHANGE UP (ref 150–400)
PLATELET # BLD AUTO: 159 K/UL — SIGNIFICANT CHANGE UP (ref 150–400)
PLATELET # BLD AUTO: 206 K/UL — SIGNIFICANT CHANGE UP (ref 150–400)
POTASSIUM SERPL-MCNC: 4.5 MMOL/L — SIGNIFICANT CHANGE UP (ref 3.5–5.3)
POTASSIUM SERPL-MCNC: 4.5 MMOL/L — SIGNIFICANT CHANGE UP (ref 3.5–5.3)
POTASSIUM SERPL-MCNC: 4.7 MMOL/L — SIGNIFICANT CHANGE UP (ref 3.5–5.3)
POTASSIUM SERPL-SCNC: 4.5 MMOL/L — SIGNIFICANT CHANGE UP (ref 3.5–5.3)
POTASSIUM SERPL-SCNC: 4.5 MMOL/L — SIGNIFICANT CHANGE UP (ref 3.5–5.3)
POTASSIUM SERPL-SCNC: 4.7 MMOL/L — SIGNIFICANT CHANGE UP (ref 3.5–5.3)
PROT SERPL-MCNC: 7.6 G/DL — SIGNIFICANT CHANGE UP (ref 6.6–8.7)
PROT SERPL-MCNC: 7.9 G/DL — SIGNIFICANT CHANGE UP (ref 6.6–8.7)
PROT SERPL-MCNC: 8 G/DL — SIGNIFICANT CHANGE UP (ref 6.6–8.7)
PROTHROM AB SERPL-ACNC: 35.2 SEC — HIGH (ref 10.6–13.6)
PROTHROM AB SERPL-ACNC: 40.9 SEC — HIGH (ref 10.6–13.6)
RBC # BLD: 4.49 M/UL — SIGNIFICANT CHANGE UP (ref 4.2–5.8)
RBC # BLD: 4.64 M/UL — SIGNIFICANT CHANGE UP (ref 4.2–5.8)
RBC # BLD: 4.8 M/UL — SIGNIFICANT CHANGE UP (ref 4.2–5.8)
RBC # FLD: 15.4 % — HIGH (ref 10.3–14.5)
RBC # FLD: 16.2 % — HIGH (ref 10.3–14.5)
RBC # FLD: 17.7 % — HIGH (ref 10.3–14.5)
SARS-COV-2 RNA SPEC QL NAA+PROBE: SIGNIFICANT CHANGE UP
SARS-COV-2 RNA SPEC QL NAA+PROBE: SIGNIFICANT CHANGE UP
SODIUM SERPL-SCNC: 130 MMOL/L — LOW (ref 135–145)
SODIUM SERPL-SCNC: 134 MMOL/L — LOW (ref 135–145)
SODIUM SERPL-SCNC: 138 MMOL/L — SIGNIFICANT CHANGE UP (ref 135–145)
TROPONIN T SERPL-MCNC: <0.01 NG/ML — SIGNIFICANT CHANGE UP (ref 0–0.06)
TROPONIN T SERPL-MCNC: <0.01 NG/ML — SIGNIFICANT CHANGE UP (ref 0–0.06)
WBC # BLD: 6.05 K/UL — SIGNIFICANT CHANGE UP (ref 3.8–10.5)
WBC # BLD: 6.28 K/UL — SIGNIFICANT CHANGE UP (ref 3.8–10.5)
WBC # BLD: 7.02 K/UL — SIGNIFICANT CHANGE UP (ref 3.8–10.5)
WBC # FLD AUTO: 6.05 K/UL — SIGNIFICANT CHANGE UP (ref 3.8–10.5)
WBC # FLD AUTO: 6.28 K/UL — SIGNIFICANT CHANGE UP (ref 3.8–10.5)
WBC # FLD AUTO: 7.02 K/UL — SIGNIFICANT CHANGE UP (ref 3.8–10.5)

## 2021-01-01 PROCEDURE — 84484 ASSAY OF TROPONIN QUANT: CPT

## 2021-01-01 PROCEDURE — U0005: CPT

## 2021-01-01 PROCEDURE — M0243: CPT

## 2021-01-01 PROCEDURE — 99284 EMERGENCY DEPT VISIT MOD MDM: CPT

## 2021-01-01 PROCEDURE — 70470 CT HEAD/BRAIN W/O & W/DYE: CPT

## 2021-01-01 PROCEDURE — 74174 CTA ABD&PLVS W/CONTRAST: CPT | Mod: 26

## 2021-01-01 PROCEDURE — 73080 X-RAY EXAM OF ELBOW: CPT | Mod: 26,RT

## 2021-01-01 PROCEDURE — 80162 ASSAY OF DIGOXIN TOTAL: CPT

## 2021-01-01 PROCEDURE — U0003: CPT

## 2021-01-01 PROCEDURE — 71045 X-RAY EXAM CHEST 1 VIEW: CPT

## 2021-01-01 PROCEDURE — 85730 THROMBOPLASTIN TIME PARTIAL: CPT

## 2021-01-01 PROCEDURE — 85610 PROTHROMBIN TIME: CPT

## 2021-01-01 PROCEDURE — T1013: CPT

## 2021-01-01 PROCEDURE — 70491 CT SOFT TISSUE NECK W/DYE: CPT

## 2021-01-01 PROCEDURE — 70491 CT SOFT TISSUE NECK W/DYE: CPT | Mod: 26,MA

## 2021-01-01 PROCEDURE — 93880 EXTRACRANIAL BILAT STUDY: CPT

## 2021-01-01 PROCEDURE — 93010 ELECTROCARDIOGRAM REPORT: CPT

## 2021-01-01 PROCEDURE — 71045 X-RAY EXAM CHEST 1 VIEW: CPT | Mod: 26

## 2021-01-01 PROCEDURE — 36415 COLL VENOUS BLD VENIPUNCTURE: CPT

## 2021-01-01 PROCEDURE — 99446 NTRPROF PH1/NTRNET/EHR 5-10: CPT

## 2021-01-01 PROCEDURE — 74174 CTA ABD&PLVS W/CONTRAST: CPT

## 2021-01-01 PROCEDURE — 80053 COMPREHEN METABOLIC PANEL: CPT

## 2021-01-01 PROCEDURE — G9005: CPT

## 2021-01-01 PROCEDURE — 93923 UPR/LXTR ART STDY 3+ LVLS: CPT

## 2021-01-01 PROCEDURE — 83735 ASSAY OF MAGNESIUM: CPT

## 2021-01-01 PROCEDURE — 99284 EMERGENCY DEPT VISIT MOD MDM: CPT | Mod: 25

## 2021-01-01 PROCEDURE — G9001: CPT

## 2021-01-01 PROCEDURE — 99285 EMERGENCY DEPT VISIT HI MDM: CPT

## 2021-01-01 PROCEDURE — 73080 X-RAY EXAM OF ELBOW: CPT

## 2021-01-01 PROCEDURE — 99283 EMERGENCY DEPT VISIT LOW MDM: CPT | Mod: 25

## 2021-01-01 PROCEDURE — 93005 ELECTROCARDIOGRAM TRACING: CPT

## 2021-01-01 PROCEDURE — 85025 COMPLETE CBC W/AUTO DIFF WBC: CPT

## 2021-01-01 PROCEDURE — 70470 CT HEAD/BRAIN W/O & W/DYE: CPT | Mod: 26

## 2021-01-01 PROCEDURE — 99212 OFFICE O/P EST SF 10 MIN: CPT | Mod: 95

## 2021-01-01 PROCEDURE — 99213 OFFICE O/P EST LOW 20 MIN: CPT

## 2021-01-01 RX ORDER — SODIUM CHLORIDE 9 MG/ML
250 INJECTION INTRAMUSCULAR; INTRAVENOUS; SUBCUTANEOUS
Refills: 0 | Status: COMPLETED | OUTPATIENT
Start: 2021-01-01 | End: 2021-01-01

## 2021-01-01 RX ADMIN — Medication 1 TABLET(S): at 17:37

## 2021-01-01 RX ADMIN — SODIUM CHLORIDE 310 MILLILITER(S): 9 INJECTION INTRAMUSCULAR; INTRAVENOUS; SUBCUTANEOUS at 09:18

## 2021-03-12 NOTE — ED STATDOCS - CHPI ED LOCATION
Clinic Care Coordination Contact  Care Coordination Transition Communication    Clinical Data: Patient was hospitalized at at [facility]  from 3/2/2021 to 3/11/2021 with diagnosis of iron deficiency.     Transition to Facility:              [facility] Name: [facility] (Phone: [phone] Fax: [phone])              Contact name and phone number/fax: Carmen ZACARIAS    Plan: RN/SW Care Coordinator will await notification from facility staff informing RN/SW Care Coordinator of patient's discharge plans/needs. RN/SW Care Coordinator will review chart and outreach to facility staff every 4 weeks and as needed.     Lucy ODEN, RN, PHN, CCM  Primary Clinic Care Coordination    [facility]  Primary Care Clinics  [email or web address]   Office: [phone]  Employed by [facility]           neck, back, head

## 2021-03-17 PROBLEM — I63.9 CEREBRAL INFARCTION, UNSPECIFIED: Chronic | Status: ACTIVE | Noted: 2020-10-30

## 2021-03-23 ENCOUNTER — EMERGENCY (EMERGENCY)
Facility: HOSPITAL | Age: 81
LOS: 1 days | Discharge: HOME | End: 2021-03-23
Attending: EMERGENCY MEDICINE
Payer: MEDICARE

## 2021-03-23 VITALS
DIASTOLIC BLOOD PRESSURE: 77 MMHG | RESPIRATION RATE: 16 BRPM | HEART RATE: 86 BPM | TEMPERATURE: 99 F | WEIGHT: 195.99 LBS | SYSTOLIC BLOOD PRESSURE: 209 MMHG | HEIGHT: 65 IN | OXYGEN SATURATION: 97 %

## 2021-03-23 DIAGNOSIS — Z87.19 PERSONAL HISTORY OF OTHER DISEASES OF THE DIGESTIVE SYSTEM: Chronic | ICD-10-CM

## 2021-03-23 DIAGNOSIS — I05.9 RHEUMATIC MITRAL VALVE DISEASE, UNSPECIFIED: Chronic | ICD-10-CM

## 2021-03-23 LAB
ALBUMIN SERPL ELPH-MCNC: 4 G/DL — SIGNIFICANT CHANGE UP (ref 3.3–5.2)
ALP SERPL-CCNC: 101 U/L — SIGNIFICANT CHANGE UP (ref 40–120)
ALT FLD-CCNC: 26 U/L — SIGNIFICANT CHANGE UP
ANION GAP SERPL CALC-SCNC: 10 MMOL/L — SIGNIFICANT CHANGE UP (ref 5–17)
APTT BLD: 50.6 SEC — HIGH (ref 27.5–35.5)
AST SERPL-CCNC: 35 U/L — SIGNIFICANT CHANGE UP
BASOPHILS # BLD AUTO: 0.05 K/UL — SIGNIFICANT CHANGE UP (ref 0–0.2)
BASOPHILS NFR BLD AUTO: 0.7 % — SIGNIFICANT CHANGE UP (ref 0–2)
BILIRUB SERPL-MCNC: 0.6 MG/DL — SIGNIFICANT CHANGE UP (ref 0.4–2)
BUN SERPL-MCNC: 22 MG/DL — HIGH (ref 8–20)
CALCIUM SERPL-MCNC: 9.4 MG/DL — SIGNIFICANT CHANGE UP (ref 8.6–10.2)
CHLORIDE SERPL-SCNC: 96 MMOL/L — LOW (ref 98–107)
CO2 SERPL-SCNC: 28 MMOL/L — SIGNIFICANT CHANGE UP (ref 22–29)
CREAT SERPL-MCNC: 1.1 MG/DL — SIGNIFICANT CHANGE UP (ref 0.5–1.3)
D DIMER BLD IA.RAPID-MCNC: 255 NG/ML DDU — HIGH
EOSINOPHIL # BLD AUTO: 0.17 K/UL — SIGNIFICANT CHANGE UP (ref 0–0.5)
EOSINOPHIL NFR BLD AUTO: 2.4 % — SIGNIFICANT CHANGE UP (ref 0–6)
GLUCOSE SERPL-MCNC: 98 MG/DL — SIGNIFICANT CHANGE UP (ref 70–99)
HCT VFR BLD CALC: 44.2 % — SIGNIFICANT CHANGE UP (ref 39–50)
HGB BLD-MCNC: 13.9 G/DL — SIGNIFICANT CHANGE UP (ref 13–17)
IMM GRANULOCYTES NFR BLD AUTO: 0.4 % — SIGNIFICANT CHANGE UP (ref 0–1.5)
INR BLD: 3.04 RATIO — HIGH (ref 0.88–1.16)
LYMPHOCYTES # BLD AUTO: 2.46 K/UL — SIGNIFICANT CHANGE UP (ref 1–3.3)
LYMPHOCYTES # BLD AUTO: 34.9 % — SIGNIFICANT CHANGE UP (ref 13–44)
MCHC RBC-ENTMCNC: 27.7 PG — SIGNIFICANT CHANGE UP (ref 27–34)
MCHC RBC-ENTMCNC: 31.4 GM/DL — LOW (ref 32–36)
MCV RBC AUTO: 88 FL — SIGNIFICANT CHANGE UP (ref 80–100)
MONOCYTES # BLD AUTO: 0.6 K/UL — SIGNIFICANT CHANGE UP (ref 0–0.9)
MONOCYTES NFR BLD AUTO: 8.5 % — SIGNIFICANT CHANGE UP (ref 2–14)
NEUTROPHILS # BLD AUTO: 3.73 K/UL — SIGNIFICANT CHANGE UP (ref 1.8–7.4)
NEUTROPHILS NFR BLD AUTO: 53.1 % — SIGNIFICANT CHANGE UP (ref 43–77)
NT-PROBNP SERPL-SCNC: 839 PG/ML — HIGH (ref 0–300)
PLATELET # BLD AUTO: 190 K/UL — SIGNIFICANT CHANGE UP (ref 150–400)
POTASSIUM SERPL-MCNC: 4.5 MMOL/L — SIGNIFICANT CHANGE UP (ref 3.5–5.3)
POTASSIUM SERPL-SCNC: 4.5 MMOL/L — SIGNIFICANT CHANGE UP (ref 3.5–5.3)
PROT SERPL-MCNC: 7.8 G/DL — SIGNIFICANT CHANGE UP (ref 6.6–8.7)
PROTHROM AB SERPL-ACNC: 33.4 SEC — HIGH (ref 10.6–13.6)
RBC # BLD: 5.02 M/UL — SIGNIFICANT CHANGE UP (ref 4.2–5.8)
RBC # FLD: 15.3 % — HIGH (ref 10.3–14.5)
SODIUM SERPL-SCNC: 134 MMOL/L — LOW (ref 135–145)
TROPONIN T SERPL-MCNC: <0.01 NG/ML — SIGNIFICANT CHANGE UP (ref 0–0.06)
WBC # BLD: 7.04 K/UL — SIGNIFICANT CHANGE UP (ref 3.8–10.5)
WBC # FLD AUTO: 7.04 K/UL — SIGNIFICANT CHANGE UP (ref 3.8–10.5)

## 2021-03-23 PROCEDURE — 71045 X-RAY EXAM CHEST 1 VIEW: CPT | Mod: 26

## 2021-03-23 PROCEDURE — 93010 ELECTROCARDIOGRAM REPORT: CPT

## 2021-03-23 PROCEDURE — 99218: CPT

## 2021-03-23 RX ORDER — LEVOTHYROXINE SODIUM 125 MCG
50 TABLET ORAL DAILY
Refills: 0 | Status: DISCONTINUED | OUTPATIENT
Start: 2021-03-23 | End: 2021-03-28

## 2021-03-23 RX ORDER — GABAPENTIN 400 MG/1
300 CAPSULE ORAL DAILY
Refills: 0 | Status: DISCONTINUED | OUTPATIENT
Start: 2021-03-23 | End: 2021-03-28

## 2021-03-23 RX ORDER — LISINOPRIL 2.5 MG/1
5 TABLET ORAL DAILY
Refills: 0 | Status: DISCONTINUED | OUTPATIENT
Start: 2021-03-23 | End: 2021-03-28

## 2021-03-23 RX ORDER — DIGOXIN 250 MCG
250 TABLET ORAL DAILY
Refills: 0 | Status: DISCONTINUED | OUTPATIENT
Start: 2021-03-23 | End: 2021-03-28

## 2021-03-23 RX ORDER — FUROSEMIDE 40 MG
20 TABLET ORAL DAILY
Refills: 0 | Status: DISCONTINUED | OUTPATIENT
Start: 2021-03-23 | End: 2021-03-28

## 2021-03-23 RX ORDER — METOPROLOL TARTRATE 50 MG
25 TABLET ORAL
Refills: 0 | Status: DISCONTINUED | OUTPATIENT
Start: 2021-03-23 | End: 2021-03-28

## 2021-03-23 RX ORDER — ASPIRIN/CALCIUM CARB/MAGNESIUM 324 MG
81 TABLET ORAL DAILY
Refills: 0 | Status: DISCONTINUED | OUTPATIENT
Start: 2021-03-23 | End: 2021-03-28

## 2021-03-23 RX ADMIN — Medication 25 MILLIGRAM(S): at 23:44

## 2021-03-23 NOTE — ED ADULT NURSE NOTE - NSIMPLEMENTINTERV_GEN_ALL_ED
Implemented All Universal Safety Interventions:  [place] to call system. Call bell, personal items and telephone within reach. Instruct patient to call for assistance. Room bathroom lighting operational. Non-slip footwear when patient is off stretcher. Physically safe environment: no spills, clutter or unnecessary equipment. Stretcher in lowest position, wheels locked, appropriate side rails in place. Implemented All Fall Risk Interventions:  [place] to call system. Call bell, personal items and telephone within reach. Instruct patient to call for assistance. Room bathroom lighting operational. Non-slip footwear when patient is off stretcher. Physically safe environment: no spills, clutter or unnecessary equipment. Stretcher in lowest position, wheels locked, appropriate side rails in place. Provide visual cue, wrist band, yellow gown, etc. Monitor gait and stability. Monitor for mental status changes and reorient to person, place, and time. Review medications for side effects contributing to fall risk. Reinforce activity limits and safety measures with patient and family.

## 2021-03-23 NOTE — ED PROVIDER NOTE - CLINICAL SUMMARY MEDICAL DECISION MAKING FREE TEXT BOX
labs and diagnostic imaging results reviewed with patient and daughter; patient pending cardiology assessment; will admit to obs

## 2021-03-23 NOTE — ED ADULT NURSE NOTE - SKIN TEMPERATURE MOISTURE
Chest xray and strep were negative.   Given improvement and now worsening again we will cover you with antibiotics for probably bacterial infection.   Zofran as needed for nausea. Cheratussin will make you drowsy so do not take before driving or working. Normal Robitussin during the day is fine.  See PCP in the next week for recheck. If worsening, come back.  Rest and push fluids.    Patient Education     Bronchitis, Antibiotic Treatment (Adult)    Bronchitis is an infection of the air passages (bronchial tubes) in your lungs. It often occurs when you have a cold. This illness is contagious during the first few days and is spread through the air by coughing and sneezing, or by direct contact (touching the sick person and then touching your own eyes, nose, or mouth).  Symptoms of bronchitis include cough with mucus (phlegm) and low-grade fever. Bronchitis usually lasts 7 to 14 days. Mild cases can be treated with simple home remedies. More severe infection is treated with an antibiotic.  Home care  Follow these guidelines when caring for yourself at home:  · If your symptoms are severe, rest at home for the first 2 to 3 days. When you go back to your usual activities, don't let yourself get too tired.  · Do not smoke. Also avoid being exposed to secondhand smoke.  · You may use over-the-counter medicines to control fever or pain, unless another medicine was prescribed. (Note: If you have chronic liver or kidney disease or have ever had a stomach ulcer or gastrointestinal bleeding, talk with your healthcare provider before using these medicines. Also talk to your provider if you are taking medicine to prevent blood clots.) Aspirin should never be given to anyone younger than 18 years of age who is ill with a viral infection or fever. It may cause severe liver or brain damage.  · Your appetite may be poor, so a light diet is fine. Avoid dehydration by drinking 6 to 8 glasses of fluids per day (such as water, soft  drinks, sports drinks, juices, tea, or soup). Extra fluids will help loosen secretions in the nose and lungs.  · Over-the-counter cough, cold, and sore-throat medicines will not shorten the length of the illness, but they may be helpful to reduce symptoms. (Note: Do not use decongestants if you have high blood pressure.)  · Finish all antibiotic medicine. Do this even if you are feeling better after only a few days.  Follow-up care  Follow up with your healthcare provider, or as advised. If you had an X-ray or ECG (electrocardiogram), a specialist will review it. You will be notified of any new findings that may affect your care.  Note: If you are age 65 or older, or if you have a chronic lung disease or condition that affects your immune system, or you smoke, talk to your healthcare provider about having pneumococcal vaccinations and a yearly influenza vaccination (flu shot).  When to seek medical advice  Call your healthcare provider right away if any of these occur:  · Fever of 100.4°F (38°C) or higher  · Coughing up increased amounts of colored sputum  · Weakness, drowsiness, headache, facial pain, ear pain, or a stiff neck   Call 911, or get immediate medical care  Contact emergency services right away if any of these occur.  · Coughing up blood  · Worsening weakness, drowsiness, headache, or stiff neck  · Trouble breathing, wheezing, or pain with breathing  © 6125-0952 [employer]. [address] [place] [address], [place] [address]. All rights reserved. This information is not intended as a substitute for professional medical care. Always follow your healthcare professional's instructions.            warm

## 2021-03-23 NOTE — ED CDU PROVIDER INITIAL DAY NOTE - ATTENDING CONTRIBUTION TO CARE
I, Jax Freed, performed a face to face bedside interview with this patient regarding history of present illness, review of symptoms and relevant past medical, social and family history.  I completed an independent physical examination. I have communicated the patient’s plan of care and disposition with the ACP.  80 year old male presents with SOB, placed on obs for tele, serial enzymes and cardio consult, no chets pain  Gen: NAD, well appearing  CV: RRR  Pul: CTA b/l  Abd: Soft, non-distended, non-tender  Neuro: no focal deficits

## 2021-03-23 NOTE — ED ADULT TRIAGE NOTE - CHIEF COMPLAINT QUOTE
Pt with cardiac history states 2 hrs ago had sudden onset SOB. Denies any pain. Pt noted to be hypertensive despite taking his medications. Pt denies chest pain, back pain. Pt had COVID 1 year ago.

## 2021-03-23 NOTE — ED ADULT NURSE NOTE - OBJECTIVE STATEMENT
pt A&Ox4 in NAD. respirations even and unlabored. CAROL. pt reports sudden onset SOB x 2 hrs PTA. pt comfortable at this time. denies any chest pain. cm in place HR 64 paced. hx of HTN, CHF and afib. pt A&Ox4 in NAD. respirations even and unlabored. pt reports sudden onset SOB x 2 hrs PTA. pt comfortable at this time. denies any chest pain. cm in place HR 64 paced. hx of HTN, CHF and afib.

## 2021-03-24 ENCOUNTER — APPOINTMENT (OUTPATIENT)
Dept: NEUROSURGERY | Facility: CLINIC | Age: 81
End: 2021-03-24

## 2021-03-24 VITALS
DIASTOLIC BLOOD PRESSURE: 77 MMHG | RESPIRATION RATE: 18 BRPM | TEMPERATURE: 98 F | HEART RATE: 68 BPM | OXYGEN SATURATION: 95 % | SYSTOLIC BLOOD PRESSURE: 174 MMHG

## 2021-03-24 LAB
APTT BLD: 52.4 SEC — HIGH (ref 27.5–35.5)
INR BLD: 2.88 RATIO — HIGH (ref 0.88–1.16)
PROTHROM AB SERPL-ACNC: 31.8 SEC — HIGH (ref 10.6–13.6)
RAPID RVP RESULT: SIGNIFICANT CHANGE UP
SARS-COV-2 RNA SPEC QL NAA+PROBE: SIGNIFICANT CHANGE UP
TROPONIN T SERPL-MCNC: <0.01 NG/ML — SIGNIFICANT CHANGE UP (ref 0–0.06)
TROPONIN T SERPL-MCNC: <0.01 NG/ML — SIGNIFICANT CHANGE UP (ref 0–0.06)

## 2021-03-24 PROCEDURE — 83880 ASSAY OF NATRIURETIC PEPTIDE: CPT

## 2021-03-24 PROCEDURE — 99217: CPT

## 2021-03-24 PROCEDURE — 80053 COMPREHEN METABOLIC PANEL: CPT

## 2021-03-24 PROCEDURE — 85379 FIBRIN DEGRADATION QUANT: CPT

## 2021-03-24 PROCEDURE — 99284 EMERGENCY DEPT VISIT MOD MDM: CPT | Mod: 25

## 2021-03-24 PROCEDURE — 93005 ELECTROCARDIOGRAM TRACING: CPT

## 2021-03-24 PROCEDURE — G0378: CPT

## 2021-03-24 PROCEDURE — 0225U NFCT DS DNA&RNA 21 SARSCOV2: CPT

## 2021-03-24 PROCEDURE — 85730 THROMBOPLASTIN TIME PARTIAL: CPT

## 2021-03-24 PROCEDURE — 71045 X-RAY EXAM CHEST 1 VIEW: CPT

## 2021-03-24 PROCEDURE — 84484 ASSAY OF TROPONIN QUANT: CPT

## 2021-03-24 PROCEDURE — 93306 TTE W/DOPPLER COMPLETE: CPT

## 2021-03-24 PROCEDURE — 93010 ELECTROCARDIOGRAM REPORT: CPT

## 2021-03-24 PROCEDURE — 36415 COLL VENOUS BLD VENIPUNCTURE: CPT

## 2021-03-24 PROCEDURE — 85025 COMPLETE CBC W/AUTO DIFF WBC: CPT

## 2021-03-24 PROCEDURE — 36000 PLACE NEEDLE IN VEIN: CPT

## 2021-03-24 PROCEDURE — 85610 PROTHROMBIN TIME: CPT

## 2021-03-24 RX ADMIN — GABAPENTIN 300 MILLIGRAM(S): 400 CAPSULE ORAL at 11:58

## 2021-03-24 RX ADMIN — Medication 25 MILLIGRAM(S): at 05:32

## 2021-03-24 RX ADMIN — Medication 81 MILLIGRAM(S): at 11:58

## 2021-03-24 RX ADMIN — Medication 25 MILLIGRAM(S): at 17:39

## 2021-03-24 NOTE — ED CDU PROVIDER DISPOSITION NOTE - CARE PROVIDER_API CALL
Alex Gaines)  Internal Medicine  [address] [place] [address]  Phone: [phone]  Fax: [phone]  Follow Up Time:

## 2021-03-24 NOTE — CONSULT NOTE ADULT - ASSESSMENT
Patient is a 81 y/o man with persistent atrial fibrillation, SSS s/p PPM, chronic HFpEF, rheumatic heart disease with severe MS s/p Mechanical MV 1994 on chronic systemic anticoagulation, prior CVA with residual left sided weakness, hypertension, JACOB not on CPAP, prior COVID 19 in March of 2020 complicated by transient O2 dependence, and anxiety disorder who presents with dyspnea    Dyspnea: suspect multifactorial   1.  Check TTE to evaluate LV EF and mechanical MVR   2.  INR goal 2.5 to 3.5 with continue ASA therapy   3.  Continue current CV medication   4. Doubt related to acute ischemia, serial troponin negative and recent PET MPI with normal myocardial perfusion

## 2021-03-24 NOTE — ED ADULT NURSE REASSESSMENT NOTE - GASTROINTESTINAL ASSESSMENT
- - - Island Pedicle Flap With Canthal Suspension Text: The defect edges were debeveled with a #15 scalpel blade.  Given the location of the defect, shape of the defect and the proximity to free margins an island pedicle advancement flap was deemed most appropriate.  Using a sterile surgical marker, an appropriate advancement flap was drawn incorporating the defect, outlining the appropriate donor tissue and placing the expected incisions within the relaxed skin tension lines where possible. The area thus outlined was incised deep to adipose tissue with a #15 scalpel blade.  The skin margins were undermined to an appropriate distance in all directions around the primary defect and laterally outward around the island pedicle utilizing iris scissors.  There was minimal undermining beneath the pedicle flap. A suspension suture was placed in the canthal tendon to prevent tension and prevent ectropion.

## 2021-03-24 NOTE — ED CDU PROVIDER DISPOSITION NOTE - PATIENT PORTAL LINK FT
You can access the FollowMyHealth Patient Portal offered by [facility] by registering at the following website: [email or web address]. By joining [employer]’s FollowMyHealth portal, you will also be able to view your health information using other applications (apps) compatible with our system.

## 2021-03-24 NOTE — ED ADULT NURSE REASSESSMENT NOTE - NS ED NURSE REASSESS COMMENT FT1
Patient in no apparent acute distress. Denies any pain or discomfort at this time. Patient remains on cardiac monitor. Respirations even, spontaneous, and unlabored. Vital signs stable. Awaiting cardiology consult. Plan of care explained. Verbalized understanding. Call bell within reach. Safety maintained.
Patient resting in comfort. Remains asymptomatic. Updated patient and daughter Lico of plan of care. Patient pending TTE read. Patient incontinent of BM, jp care provided. No further questions for the RN. Will continue to monitor.
Assumed care of the patient at 0730. Verbal report received from Paula. Patient A&Ox4. Icelandic speaking only,  at the bedside. Patient in NAD. Denies CP/SOB or dizziness. V-paced rhythm on CM. Patient pending TTE testing. b/l discoloration to LE noted, per patient chronic. Patient in understanding of plan of care. Patient with no further questions for the RN. Resting in comfort. Call bell within reach and encouraged to use when assistance needed. Will continue to monitor.

## 2021-03-24 NOTE — ED CDU PROVIDER SUBSEQUENT DAY NOTE - ATTENDING CONTRIBUTION TO CARE
Patient pending cards evaluation.  VSS.  Non toxic.  Well appearing. feeling better.  will continue to monitor.

## 2021-03-24 NOTE — ED CDU PROVIDER DISPOSITION NOTE - CLINICAL COURSE
79yo man with persistent atrial fibrillation, SSS s/p PPM, chronic HFpEF, rheumatic heart disease with severe MS s/p Mechanical MV on chronic systemic anticoagulation, prior CVA with residual left sided weakness, hypertension, JACOB not on CPAP presented to ED c/o dyspnea. Placed in obs for SB cardiology evaluation, serial enzymes, tte. Trop neg x 3. D-dimer 255, NEGATIVE age-adjusted. TTE with LVEF 55-60%, bioprosthetic mitral valve with normal function, stable compared to TTE from 7/8/20. Pt stable for dc from cardiology perspective. Discussed results with pt with . Given copy of all results. Return precautions discussed, stable for dc.

## 2021-03-24 NOTE — ED CDU PROVIDER DISPOSITION NOTE - NSFOLLOWUPINSTRUCTIONS_ED_ALL_ED_FT
- Follow up with your doctor within 2-3 days.   - Return to the ED for any new or worsening symptoms.   - Follow-up with your Cardiologist within 1-2 weeks for further evaluation  - Continue taking all of your prescribed medications    Shortness of breath    Shortness of breath (dyspnea) means you have trouble breathing and could indicate a medical problem. Causes include lung disease, heart disease, low amount of red blood cells (anemia), poor physical fitness, being overweight, smoking, etc. Your health care provider today may not be able to find a cause for your shortness of breath after your exam. In this case, it is important to have a follow-up exam with your primary care physician as instructed. If medicines were prescribed, take them as directed for the full length of time directed. Refrain from tobacco products.    SEEK IMMEDIATE MEDICAL CARE IF YOU HAVE ANY OF THE FOLLOWING SYMPTOMS: worsening shortness of breath, chest pain, back pain, abdominal pain, fever, coughing up blood, lightheadedness/dizziness.      - Dolores un seguimiento con mann médico dentro de 2-3 días.  - Regrese al servicio de urgencias por cualquier síntoma nuevo o que empeore.  - Dolores un seguimiento con mann cardiólogo dentro de 1-2 semanas para sonam evaluación adicional  - Continúe tomando todos alexa medicamentos recetados    Dificultad para respirar    Falta de aire (disnea) significa que tiene problemas para respirar y podría indicar un problema médico. Las causas incluyen enfermedad pulmonar, enfermedad cardíaca, baja cantidad de glóbulos rojos (anemia), chino condición física, sobrepeso, tabaquismo, etc. Es posible que mann proveedor de atención médica no pueda encontrar la causa de mann dificultad para respirar después de mann examen. . En ele debi, es importante realizarse un examen de seguimiento con mann médico de atención primaria según las instrucciones. Si le recetaron medicamentos, tómelos según las indicaciones kaitlynn todo el tiempo indicado. Abstenerse de productos de tabaco.    BUSQUE ATENCIÓN MÉDICA INMEDIATA SI TIENE ALGUNO DE LOS SIGUIENTES SÍNTOMAS: empeoramiento de la falta de aire, dolor de pecho, dolor de espalda, dolor abdominal, fiebre, tos con amador, aturdimiento / mareo.

## 2021-03-24 NOTE — ED CDU PROVIDER DISPOSITION NOTE - ATTENDING CONTRIBUTION TO CARE
I, Irina Oro, performed a face to face bedside interview with this patient regarding history of present illness, review of symptoms and relevant past medical, social and family history.  I completed an independent physical examination. Medical decision making, follow-up on ordered tests (ie labs, radiologic studies) and re-evaluation of the patient's status has been communicated to the ACP.  Disposition of the patient will be based on test outcome and response to ED interventions.     patient cleared by cardiology, TTE unchanged from previous. will d/c

## 2021-03-26 NOTE — ED ADULT NURSE REASSESSMENT NOTE - URINE CHARACTERISTICS
Called patient to schedule a screening mammogram PATIENTPHONEMESSAGE: left message.-     Additional information     per pt, in urinal/clear

## 2021-03-31 NOTE — ED CDU PROVIDER INITIAL DAY NOTE - TOBACCO USE
Detail Level: Generalized Detail Level: Zone Detail Level: Detailed Quality 137: Melanoma: Continuity Of Care - Recall System: Patient information entered into a recall system that includes: target date for the next exam specified AND a process to follow up with patients regarding missed or unscheduled appointments When Should The Patient Follow-Up For Their Next Full-Body Skin Exam?: 6 Months Never smoker

## 2021-04-26 ENCOUNTER — APPOINTMENT (OUTPATIENT)
Dept: VASCULAR SURGERY | Facility: CLINIC | Age: 81
End: 2021-04-26
Payer: MEDICARE

## 2021-04-26 VITALS
RESPIRATION RATE: 15 BRPM | BODY MASS INDEX: 32.27 KG/M2 | DIASTOLIC BLOOD PRESSURE: 77 MMHG | TEMPERATURE: 97.8 F | SYSTOLIC BLOOD PRESSURE: 166 MMHG | HEART RATE: 66 BPM | HEIGHT: 64 IN | OXYGEN SATURATION: 97 % | WEIGHT: 189 LBS

## 2021-04-26 PROCEDURE — 99072 ADDL SUPL MATRL&STAF TM PHE: CPT

## 2021-04-26 PROCEDURE — 93978 VASCULAR STUDY: CPT

## 2021-04-26 PROCEDURE — 99213 OFFICE O/P EST LOW 20 MIN: CPT

## 2021-05-17 ENCOUNTER — APPOINTMENT (OUTPATIENT)
Dept: VASCULAR SURGERY | Facility: CLINIC | Age: 81
End: 2021-05-17
Payer: MEDICARE

## 2021-05-17 VITALS
OXYGEN SATURATION: 96 % | HEIGHT: 64 IN | SYSTOLIC BLOOD PRESSURE: 169 MMHG | TEMPERATURE: 98 F | DIASTOLIC BLOOD PRESSURE: 69 MMHG | RESPIRATION RATE: 15 BRPM | HEART RATE: 68 BPM

## 2021-05-17 DIAGNOSIS — I89.0 LYMPHEDEMA, NOT ELSEWHERE CLASSIFIED: ICD-10-CM

## 2021-05-17 DIAGNOSIS — M79.89 OTHER SPECIFIED SOFT TISSUE DISORDERS: ICD-10-CM

## 2021-05-17 PROCEDURE — 99072 ADDL SUPL MATRL&STAF TM PHE: CPT

## 2021-05-17 PROCEDURE — 93971 EXTREMITY STUDY: CPT

## 2021-05-17 PROCEDURE — 99213 OFFICE O/P EST LOW 20 MIN: CPT

## 2021-05-20 NOTE — ED PROVIDER NOTE - OBJECTIVE STATEMENT
Pt is a 81yo M presenting with a neck mass. Pt states that he told his PMD who put him on an antibiotic. He reports he was on the antibiotic for 2 weeks and the swelling resolved. He states that he stopped the antibiotics about 3 days ago and since then the swelling came back. He reports chills and pain in the R neck. Reports he has dentures, no recent infections, no recent illnesses, had covid last year. Denies dysphagia, odynophagia, chest pain, sob, nausea, vomiting.

## 2021-05-20 NOTE — ED PROVIDER NOTE - NS ED ROS FT
General: Endorses chills  HEENT: Denies visual changes, sore throat  Neck: Endorses neck swelling and pain  Resp: Denies coughing, SOB  Cardiovascular: Denies CP, palpitations, LE edema  GI: Denies abdominal pain, nausea, vomiting  : Denies dysuria, hematuria, incontinence  MSK: Denies back pain  Neuro: Denies HA, dizziness, numbness  Skin: Denies rashes

## 2021-05-20 NOTE — ED PROVIDER NOTE - CLINICAL SUMMARY MEDICAL DECISION MAKING FREE TEXT BOX
Patient presenting with R neck swelling. Labwork showing patient has elevated INR, no acute signs of infection. CT imaging revealing sialoadenitis. Patient to be discharged to home with antibiotics.

## 2021-05-20 NOTE — ED PROVIDER NOTE - PATIENT PORTAL LINK FT
You can access the FollowMyHealth Patient Portal offered by [facility] by registering at the following website: [email or web address]. By joining [employer]’s FollowMyHealth portal, you will also be able to view your health information using other applications (apps) compatible with our system.

## 2021-05-20 NOTE — ED ADULT TRIAGE NOTE - CHIEF COMPLAINT QUOTE
pt comes to ed from home for swelling to right neck x 3 weeks. saw pmd 3 weeks ago and completed 1 week of keflex before pmd said he could stop due to upset stomach. patient swelling returned as soon as he completed the meds 2 weeks ago. + chills.

## 2021-05-20 NOTE — ED PROVIDER NOTE - PHYSICAL EXAMINATION
General: Well appearing male in no acute distress  HEENT: R anterior neck with palpable firm and tender mass. Oropharynx clear, no signs of infection or decay.   Eyes: No scleral icterus. No conjunctival pallor. EOMI. ESME.  Neck: Soft and supple. Full ROM without pain.  Cardiac: Pacemaker palpable. Regular rate and regular rhythm. No murmurs.   Resp: Lungs CTAB. No wheezes, rales or rhonchi.  Abd: Soft, non-tender, non-distended. No guarding or rebound.   Back: Spine midline and non-tender. No CVA tenderness.    Skin: No rashes, abrasions, or lacerations.  Neuro: AO x 3. Motor strength and sensation grossly intact.

## 2021-05-20 NOTE — ED ADULT NURSE REASSESSMENT NOTE - NS ED NURSE REASSESS COMMENT FT1
RN spoke w/ daughter Aida ([phone]) regarding care of pt. Daughter reports that HHA observed growth on back that daughter is requesting to be looked at. MD Hernandez informed.

## 2021-05-20 NOTE — ED ADULT NURSE NOTE - SUICIDE SCREENING QUESTION 2
ADELAIDE HOFFMAN is a 33y  s/p repeat  section and bilateral salpingectomy POD #1. C/B by PEC wSF on MgSO4.  - Pain controlled on current medications  - Tolerating po   - (-) flatus  - (-) void  - hgb  11.3  --> am cbc  - Lovenox for DVT prophylaxis   - Rh+  - Pt with male infant; declines circumcision  - No signs of mg toxicity   - Dispo: Continue post op care. Continue MgSO4 until 24 hours PP.    No

## 2021-05-20 NOTE — ED PROVIDER NOTE - NSFOLLOWUPINSTRUCTIONS_ED_ALL_ED_FT
La sialoadenitis es sonam inflamación o infección de sonam o más de alexa glándulas salivales. Sonam pequeña isabella puede bloquear la glándula salival y causar inflamación. La infección puede ser causada por un virus o sonam bacteria. Puede desarrollar sialoadenitis en josephine o ambos lados de la donal.  Los síntomas comunes incluyen los siguientes: • Dolor e hinchazón de sonam glándula salival, especialmente kaitlynn o inmediatamente después de comer   • Mal aliento o dolor de dientes   • pus en la boca   •Fiebre   Busque atención médica de inmediato si: • Tiene dificultad para respirar o tragar debido a la hinchazón.     Llame a mann médico si: • Tiene problemas para abrir la boca debido a la hinchazón.   • Mann glándula salival se pone más umesh y caliente o drena más pus.   • El dolor y la hinchazón no desaparecen en 2 días o empeoran.   • Tiene la boca muy seca.   • Pierde movimiento en un lado de la donal.   • Tiene preguntas sobre mann afección o atención.   El tratamiento puede incluir cualquiera de los siguientes: • Medicamentos: ? Se pueden administrar antibióticos para tratar sonam infección bacteriana.   ? El acetaminofén reduce el dolor y la fiebre. Está disponible sin receta médica. Pregunte cuánto debe darle a mann hijo y con qué frecuencia. Seguir direcciones. Pancho las etiquetas de todos los demás medicamentos que usa mann hijo para papo si también contienen acetaminofén, o pregunte al médico o farmacéutico de mann hijo. El acetaminofén puede causar daño hepático si no se marian correctamente.   ? Los DREW, miguel el ibuprofeno, ayudan a disminuir la hinchazón, el dolor y la fiebre. Akanksha medicamento está disponible con o sin receta médica. Los DREW pueden causar hemorragia estomacal o problemas renales en determinadas personas. Si marian medicamentos anticoagulantes, pregúntele siempre a mann proveedor de atención médica si los DREW son seguros para usted. Siempre pancho la etiqueta del medicamento y siga las instrucciones.   ? [place] mann medicamento según las indicaciones. Comuníquese con mann proveedor de atención médica si jacques que mann medicamento no le está ayudando o si tiene efectos secundarios. Dígale si es alérgico a algún medicamento. Mantenga sonam lista de los medicamentos, vitaminas y hierbas que marian. Incluya las cantidades, cuándo y por qué las marian. Lleve la lista o los frascos de pastillas a las visitas de seguimiento. Lleve consigo mann lista de medicamentos en debi de sonam emergencia.   • Procedimientos: ? Es posible que sea necesario retirar josephine o más cálculos si otros tratamientos no funcionan.   ? Es posible que se necesite sonam incisión y drenaje si hay un absceso (bolsa de pus) que no responde a otros tratamientos.    Manejar o prevenir la sialoadenitis: • Luz líquidos según las indicaciones. Puede que necesite beber más líquidos de lo habitual. Pregunte cuánto líquido debe beber cada día y qué líquidos son mejores para usted. Las buenas opciones de líquidos para la mayoría de las personas incluyen agua, té, sopa, jugo o leche.   • Practique un buen cuidado bucal. [place] alexa dientes 2 veces al día, 1 vez por la mañana y 1 vez por la noche. Utilice sonam pasta de dientes con flúor. Use hilo dental 1 vez al día, generalmente por la noche. Use enjuague bucal después de usar hilo dental. Agítelo en la boca kaitlynn 30 segundos y escúpelo.   • Mantenga mann boca húmeda. Chupe caramelos duros o mastique chicle sin azúcar para que la saliva fluya. Los sabores agrios y agrios miguel el rene y la naranja ayudarán a que la saliva fluya. Brayton ayudará a mantener la boca húmeda y ayudará a expulsar sonam isabella que bloquea el conducto salival.   • Aplique un paño húmedo y tibio y masajee el área hinchada según las indicaciones. Brayton puede ayudar a aliviar la hinchazón y el dolor al expulsar el pus de la glándula.

## 2021-05-20 NOTE — ED ADULT NURSE NOTE - NSIMPLEMENTINTERV_GEN_ALL_ED
Implemented All Fall Risk Interventions:  [place] to call system. Call bell, personal items and telephone within reach. Instruct patient to call for assistance. Room bathroom lighting operational. Non-slip footwear when patient is off stretcher. Physically safe environment: no spills, clutter or unnecessary equipment. Stretcher in lowest position, wheels locked, appropriate side rails in place. Provide visual cue, wrist band, yellow gown, etc. Monitor gait and stability. Monitor for mental status changes and reorient to person, place, and time. Review medications for side effects contributing to fall risk. Reinforce activity limits and safety measures with patient and family.
DISPLAY PLAN FREE TEXT

## 2021-05-20 NOTE — ED ADULT NURSE NOTE - OBJECTIVE STATEMENT
Pt A&Ox3 c/o swelling in neck. Upon palpation, pt states "feeling a mass" on the right side of neck. Pt states he saw his PCP, which prescribed "an antibiotic" "but stopped taking it because it was upsetting my stomach". HHA at bedside states "no fevers but experiencing chills". Pt states this swelling began 2 weeks ago. Pt denies pain in neck region, CP, or SOB. Airway clear and unobstructed. Respirations even and unlabored. PMH- hypothyroidism, CVA.

## 2021-05-24 NOTE — REASON FOR VISIT
[Home] : at home, [unfilled] , at the time of the visit. [Medical Office: ([facility])___] : at the medical office located in  [Verbal consent obtained from patient] : the patient, [unfilled] [Follow-Up: _____] : a [unfilled] follow-up visit [FreeTextEntry1] : Mr. Rodriguez presents today for follow up visit. At today's visit, he denies any headaches complaints, n/v. Visual disturbance include history of cataract surgery and wears glasses for reading. He has never had a seizure. He denies any numbness/tingling or weakness of the extremities. No difficulty with ambulation or balance disturbances. Visual field testing performed 2019. VF;s were stable at that time. He has not been evaluated by endocrine for several years. Patient has a pacemaker. He is currently managed on Coumadin for anticoagulation. As per patient, he is doing well. His daughter Aida was on speaker phone during visit and agrees from her perspective there are no new concerns. 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

## 2021-06-21 NOTE — ASSESSMENT
[FreeTextEntry1] : Mr. Rodriguez presents with above history. His symptoms remain stable.  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 \par \par \par Plan:\par Recommend to follow up with Dr. Reaves to discuss surgical intervention vs. surveillance.\par Patient will present with new VF testing to assess for any progression. \par He and his wife know to call the office if there are any new or worsening symptoms. \par \par \par I have performed telehealth visit along with my nurse practitioner, Farrah Sarah .  I have personally determined the assessment and plan of care based on today's encounter.\par

## 2021-06-21 NOTE — REASON FOR VISIT
[Home] : at home, [unfilled] , at the time of the visit. [Medical Office: ([facility])___] : at the medical office located in  [Follow-Up: _____] : a [unfilled] follow-up visit [FreeTextEntry3] : Aida Padgett  [FreeTextEntry1] : Aida Padgett (daughter) of Anival Padgett presents via teleheath to discuss CT head w/wo contrast results for surveillance of pituitary macroadenoma. 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  At today's visit, he denies any headaches complaints, n/v per daughter.   Visual disturbance include history of cataract surgery and wears glasses for reading. He has never had a seizure. He denies any numbness/tingling or weakness of the extremities. No difficulty with ambulation or balance disturbances. Visual field testing performed 2019. VF;s were stable at that time. Daughter states he had a new VF exam 6/2021.  Will obtain results.  He has not been evaluated by endocrine for several years. Patient has a pacemaker. He is currently managed on Coumadin for anticoagulation. As per patient, he is doing well. His daughter Aida was on speaker phone during visit and agrees from her perspective there are no new concerns. \par \par

## 2021-06-21 NOTE — DATA REVIEWED
[de-identified] : \par EXAM: CT BRAIN WAW IC\par \par \par PROCEDURE DATE: 05/31/2021\par \par \par \par INTERPRETATION: Clinical indications: Follow-up pituitary macroadenoma.\par \par Multiple axial sections were performed base skull to vertex before and after administration of approximately 90 cc of Omnipaque 350 IV. Coronal and sagittal constructions were performed as well.\par \par This exam is compared with prior noncontrast head CT performed on March 18, 2020.\par \par Parenchymal volume loss and chronic microvascular ischemic changes are again seen.\par \par Prominence of bifrontal extra space is again seen. There appears to be crossing vessels suggesting that this is due to increase atrophy rather than chronic subdural hematoma/hygroma.\par \par Abnormal low-attenuation involving the right perisylvian basal ganglia and corona radiata region. This compatible with an old infarct.\par \par Again seen is an enhancing lesion involving the sella/suprasellar region. This is consistent with a pituitary macroadenoma. This lesion measures approximately 1.8 x 2.4 x 2.3 cm (previously measuring approximately 1.6 x 2.8 x 2.1 cm). This appears to be abutting the optic chiasm. Both adjacent internal carotid arteries demonstrate normal enhancement.\par \par Evaluation of the osseous structures with the appropriate window appears normal.\par \par The visualized paranasal sinuses mastoid regions appear clear.\par \par IMPRESSION: Old infarct as described above.\par \par Pituitary macroadenoma again identified as described above.\par

## 2021-09-16 NOTE — CHART NOTE - NSCHARTNOTEFT_GEN_A_CORE
CC: Monoclonal Antibody Infusion/COVID 19 HIGH RISK EXPOSURE PROPHYLAXIS      History: Patient presents for infusion of monoclonal antibody infusion. Patient has been screened and was deemed to be a candidate.    Symptoms/ Criteria: None    Risk Profile includes: EXPOSED 9/13/21 to Home Health Aide 9/13/21-Age >65    casirivimab/imdevimab in sodium chloride 0.9% (EUA) IVPB 100 milliLiter(s) IV Intermittent once  sodium chloride 0.9%. 250 milliLiter(s) IV Continuous <Continuous>      PMHx:  Infection due to severe acute respiratory syndrome coronavirus 2 (SARS-CoV-2)    H/o or current diagnosis of HF- ACEI/ARB contraindication unknown    H/o or current diagnosis of HF- Contraindication to ACEI/ARBs    No pertinent family history in first degree relatives    HTN (hypertension)    Mitral valve disease    CHF (congestive heart failure)    Respiratory insufficiency    Afib    CVA (cerebral vascular accident)    Chronic mitral valve disease    H/O inguinal hernia          T(C): 36.9 (09-16-21 @ 08:31), Max: 36.9 (09-16-21 @ 08:31)  HR: 66 (09-16-21 @ 08:31) (66 - 66)  BP: 171/69 (09-16-21 @ 08:31) (171/69 - 171/69)  RR: 18 (09-16-21 @ 08:31) (18 - 18)  SpO2: 96% (09-16-21 @ 08:31) (96% - 96%)      PE:   Appearance: NAD	  HEENT:   Normal oral mucosa.   Lymphatic: No lymphadenopathy  Cardiovascular: Normal S1 S2, No JVD, No murmurs, No edema  Respiratory: Lungs clear to auscultation	  Gastrointestinal:  Soft, Non-tender. No guarding   Skin: warm and dry  Neurologic: Non-focal  Extremities: Normal range of motion.     ASSESSMENT:  Pt is a 81y year old Male Covid +  referred to the infusion center for Monoclonal antibody infusion (Regeneron).  Pt was vaccinated with Moderna 8/11/21.      PLAN:  - infusion procedure explained to patient   - Consent for monoclonal antibody infusion obtained   - Risk & benefits discussed/all questions answered  - infuse Regeneron over 21 minutes  - will observe patient for one hour post infusion  and then if stable discharge home with outpt follow up as planned by PMD.    POST INFUSION ASSESSMENT:   DISCHARGE at approximately  1  hour post infusion    - Patient tolerated infusion well denies complaints of chest pain/SOB/dizziness/ palps  - VSS for discharge home  - D/C instructions given/ fact sheet included.  - Patient to follow-up with PCP as needed.

## 2021-09-30 NOTE — ED PROVIDER NOTE - NSICDXPASTMEDICALHX_GEN_ALL_CORE_FT
PAST MEDICAL HISTORY:  Afib     CHF (congestive heart failure)     CVA (cerebral vascular accident)     HTN (hypertension)     Mitral valve disease     Respiratory insufficiency

## 2021-09-30 NOTE — ED PROVIDER NOTE - NSFOLLOWUPINSTRUCTIONS_ED_ALL_ED_FT
1) Dolores un seguimiento con mann cardiólogo dentro de sonam semana  2) Regrese a la americo de emergencias si los síntomas empeoran o le preocupan  1) Follow up with your cardiologist within one week  2) Return to the ER for worsening or concerning symptoms      Palpitaciones cardíacas    LO QUE NECESITA SABER:    Las palpitaciones cardíacas son sensaciones que se perciben miguel aceleraciones, chico, latidos o aleteos del corazón. También podría sentir latidos adicionales, que mann corazón no late por un corto periodo de tiempo o que se saltean los latidos. Puede percibir estas sensaciones en el pecho, la garganta o el jay. Pueden presentarse cuando está sentado, de pie o acostado. Las palpitaciones cardíacas pueden causar temor; sin embargo, generalmente no se deben a un problema importante.    INSTRUCCIONES SOBRE EL PRERNA HOSPITALARIA:    Llame al 911 o pídale a alguien más que llame en cualquiera de los siguientes casos:  •Tiene alguno de los siguientes signos de un ataque cardíaco: ?Estrujamiento, presión o tensión en mann pecho      ?Usted también podría presentar alguno de los siguientes: ?Malestar o dolor en mann espalda, jay, mandíbula, abdomen, o brazo      ?Falta de aliento      ?Náuseas o vómitos      ?Desvanecimiento o sudor frío repentino        •Usted tiene alguno de los siguientes signos de derrame cerebral: ?Adormecimiento o caída de un lado de mann donal      ?Debilidad en un brazo o sonam pierna      ?Confusión o debilidad para hablar      ?Mareos o dolor de connie intenso, o pérdida de la visión.      •Usted se desmaya o pierde el conocimiento.      Regrese a la americo de emergencias si:  •Alexa palpitaciones ocurren con más frecuencia o son más intensas.          Comuníquese con mann médico si:  •Usted tiene nueva inflamación en alexa pies o tobillos o esta ha empeorado.      •Usted tiene preguntas o inquietudes acerca de mann condición o cuidado.      Acuda a alexa consultas de control con mann médico según le indicaron.Es posible que necesite un seguimiento con un cardiólogo. Saroj vez deba hacerse exámenes para determinar si sufre problemas cardíacos que le causan las palpitaciones. Anote alexa preguntas para que se acuerde de hacerlas kaitlynn alexa visitas.    Mantenga un registro:Escriba cuándo alexa palpitaciones comienzan y terminan, qué estaba usted haciendo cuando comenzaron y alexa síntomas. Mantenga un registro de lo que usted comió o tomó kaitlynn las horas antes de laexa palpitaciones. Incluya todo lo que aparentemente le ayudó a que alexa síntomas mejoraran miguel acostarse o contener mann respiración. Akanksha registro le ayudará a usted y a mann médico para saber qué provoca alexa palpitaciones. Lleve el registro con usted a alexa citas de seguimiento.    Cómo ayudar a prevenir las palpitaciones cardíacas:  •Controlar el estrés y la ansiedad.Encuentre formas de relajarse, miguel escuchar música, meditar o hacer yoga. El ejercicio también puede ayudar a disminuir el estrés y la ansiedad. Hablar con alguien de confianza acerca de mann estrés o ansiedad. También puede hablar con un psicoterapeuta.      •Duerma lo suficiente cada la noche.Pregunte a mann médico cuánto debería dormir usted cada noche.      •No tome bebidas con cafeína o alcohol.La cafeína y el alcohol pueden hacer que alexa palpitaciones empeoren. La cafeína se encuentra en refrescos, café, té, chocolate y bebidas que aumentan mann energía.      •No fume.La nicotina y otros químicos en los cigarrillos y cigarros podrían provocar daño a mann corazón y a alexa vasos sanguíneos. Pida información a mann médico si usted actualmente fuma y necesita ayuda para dejar de fumar. Los cigarrillos electrónicos o el tabaco sin humo igualmente contienen nicotina. Consulte con mann médico antes de utilizar estos productos.      Heart Palpitations    WHAT YOU NEED TO KNOW:    Heart palpitations are feelings that your heart races, jumps, throbs, or flutters. You may feel extra beats, no beats for a short time, or skipped beats. You may have these feelings in your chest, throat, or neck. They may happen when you are sitting, standing, or lying. Heart palpitations may be frightening, but are usually not caused by a serious problem.     DISCHARGE INSTRUCTIONS:    Call 911 or have someone else call for any of the following:   •You have any of the following signs of a heart attack: ?Squeezing, pressure, or pain in your chest      ?You may also have any of the following: ?Discomfort or pain in your back, neck, jaw, stomach, or arm      ?Shortness of breath      ?Nausea or vomiting      ?Lightheadedness or a sudden cold sweat        •You have any of the following signs of a stroke: ?Numbness or drooping on one side of your face       ?Weakness in an arm or leg      ?Confusion or difficulty speaking      ?Dizziness, a severe headache, or vision loss      •You faint or lose consciousness.       Return to the emergency department if:   •Your palpitations happen more often or get more intense.           Contact your healthcare provider if:   •You have new or worsening swelling in your feet or ankles.      •You have questions or concerns about your condition or care.      Follow up with your healthcare provider as directed: You may need to follow up with a cardiologist. You may need tests to check for heart problems that cause palpitations. Write down your questions so you remember to ask them during your visits.     Keep a record: Write down when your palpitations start and stop, what you were doing when they started, and your symptoms. Keep track of what you ate or drank within a few hours of your palpitations. Include anything that seemed to help your symptoms, such as lying down or holding your breath. This record will help you and your healthcare provider learn what triggers your palpitations. Bring this record with you to your follow up visits.    Help prevent heart palpitations:   •Manage stress and anxiety. Find ways to relax such as listening to music, meditating, or doing yoga. Exercise can also help decrease stress and anxiety. Talk to someone you trust about your stress or anxiety. You can also talk to a therapist.       •Get plenty of sleep every night. Ask your healthcare provider how much sleep you need each night.       •Do not drink caffeine or alcohol. Caffeine and alcohol can make your palpitations worse. Caffeine is found in soda, coffee, tea, chocolate, and drinks that increase your energy.       •Do not smoke. Nicotine and other chemicals in cigarettes and cigars may damage your heart and blood vessels. Ask your healthcare provider for information if you currently smoke and need help to quit. E-cigarettes or smokeless tobacco still contain nicotine. Talk to your healthcare provider before you use these products.       •Do not use illegal drugs. Talk to your healthcare provider if you use illegal drugs and want help to quit.     •No use drogas ilegales.Hable con mann médico si consume drogas ilegales y quiere dejarlas.

## 2021-09-30 NOTE — ED PROVIDER NOTE - CLINICAL SUMMARY MEDICAL DECISION MAKING FREE TEXT BOX
82 yo male with SOB, chest pain, palpitations that began earlier today and then resolved. will pursue cardiac workup, monitor and reassess the patient.

## 2021-09-30 NOTE — ED PROVIDER NOTE - PROGRESS NOTE DETAILS
Patient resting comfortably. Labs are as noted. Reviewed recently cardiology consultation from march of this year. Patient has had a negative stress test. Patient pending repeat troponin. Patient continues to rest comfortably. June: Patient resting comfortably. Asymptomatic at this time. Second Troponin negative.

## 2021-09-30 NOTE — ED PROVIDER NOTE - ATTENDING CONTRIBUTION TO CARE
89 yo male presents for evaluation of episode of chest discomfort associated with palpitations and burning diffuse chest pain during episode of excitement this evening. Patient states episode lasted several minutes then resolved. Patient has been comfortable since. He currently has a normal examination. I personally saw the patient with the resident, and completed the key components of the history and physical exam. I then discussed the management plan with the resident.

## 2021-09-30 NOTE — ED PROVIDER NOTE - CARE PROVIDER_API CALL
Jad Carey (MD)  Cardiovascular Disease; Internal Medicine  [address] [place] [address]  Phone: [phone]  Fax: [phone]  Follow Up Time: 1-3 Days

## 2021-09-30 NOTE — ED PROVIDER NOTE - OBJECTIVE STATEMENT
82 yo male history of CHF, AFIB, CVA, HTN presents with sudden onset palpitations that began while watching television earlier this evening. Per the patient, he was watching a soccer game when he experienced sudden onset palpitations, SOB. He states that his symptoms lasted for a few minutes before resolving. The patient states that he currently is asymptomatic. He is a patient of Dr. Carey. Denies fever/chills, cough, sore throat, abd pain, n/v/d, urinary complaints.

## 2021-09-30 NOTE — ED ADULT TRIAGE NOTE - CHIEF COMPLAINT QUOTE
Pt A&Ox4 states "I have palpitations and feel shortness of breath."  BIBA c/o chest pressure and palpitations with SOB. Patient was watching a soccer game got upset and started having palpitations.

## 2021-09-30 NOTE — ED ADULT NURSE NOTE - OBJECTIVE STATEMENT
Assumed care of pt at 03:30. Pt A&Ox3 c/o chest discomfort x1 day. As per pt, he was watching soccer and began getting sharp chest pain. Pt denies ETOH intake. Pt denies SOB/dyspnea. Pt states significant cardiac hx including pacemaker. Pt placed on CM (v-paced), no signs of acute distress noted. [employer] utilized for translation.

## 2021-09-30 NOTE — ED PROVIDER NOTE - PATIENT PORTAL LINK FT
You can access the FollowMyHealth Patient Portal offered by [facility] by registering at the following website: [email or web address]. By joining [employer]’s FollowMyHealth portal, you will also be able to view your health information using other applications (apps) compatible with our system.

## 2021-10-19 PROBLEM — I73.9 PVD (PERIPHERAL VASCULAR DISEASE) WITH CLAUDICATION: Status: ACTIVE | Noted: 2021-01-01

## 2021-10-19 PROBLEM — I63.9 CEREBROVASCULAR ACCIDENT (CVA), UNSPECIFIED MECHANISM: Status: ACTIVE | Noted: 2020-01-21

## 2021-12-31 NOTE — ED PROVIDER NOTE - ATTENDING CONTRIBUTION TO CARE
I, Jax Freed, performed a face to face bedside interview with this patient regarding history of present illness, review of symptoms and relevant past medical, social and family history.  I completed an independent physical examination. I have communicated the patient’s plan of care and disposition with the ACP.  81 year old male with PMH CVA with residual L sided weakness, HTN, CHF presents with R arm swelling and pain ever since fall on 12/23. No numbness, tigonin, weakness, did not hit head, no headache  Gen: NAD, well appearing  CV: RRR  Pul: CTA b/l  Abd: Soft, non-distended, non-tender  Neuro: no focal deficits  msk: ecchymosis, tenderness, swelling to the R elbow  Pt consistent with hematoma, compartments soft, distal arm is warm and well perfused

## 2021-12-31 NOTE — ED PROVIDER NOTE - CLINICAL SUMMARY MEDICAL DECISION MAKING FREE TEXT BOX
80 y/o male with hx of HTN, CHF, CVA with left sided residual weakness, afib, hx of mitral valve replacement currently on coumadin 6 mg presents to the ED c/o right arm pain and bruising. extensive ecchymosis with area of palpated ecchymosis of the right lateral elbow just below the lateral epicondyle 82 y/o male with hx of HTN, CHF, CVA with left sided residual weakness, afib, hx of mitral valve replacement currently on coumadin 6 mg presents to the ED c/o right arm pain and bruising. extensive ecchymosis with area of palpated heamtoma of the right lateral elbow just below the lateral epicondyle, inr within therapeutic levels, will apply ace wrap

## 2021-12-31 NOTE — ED PROVIDER NOTE - LANGUAGE ASSISTANCE NEEDED
Yes-Patient/Caregiver accepts free interpretation services...
Yes - the patient is able to be screened

## 2021-12-31 NOTE — ED PROVIDER NOTE - NS ED MD DISPO DISCHARGE CCDA
Alert-The patient is alert, awake and responds to voice. The patient is oriented to time, place, and person. The triage nurse is able to obtain subjective information. Patient/Caregiver provided printed discharge information.

## 2021-12-31 NOTE — ED PROVIDER NOTE - PHYSICAL EXAMINATION
General-alert and oriented to person place and time, nontoxic appearing, pleasant cooperative, NAD  HEENT-normocephalic, atraumatic, NT to palp, EOMI, PERRLA,   Neck- supple, trach midline, No JVD, no LAD  Chest- Nt to palp, no reproducible pain  Cardio-s1,s2 present, regular rate and rhythm  Resp- talks in full sentences, symmetrical chest rise, CTA bilat, no evidence of wheezes, rhonchi noted  MSK- FROM of the right arm and elbow, tender to palp of skin with ecchymosis extending from the mid medial humerus extending down to the wrist, localized hematoma noted to just distal to lateral epicondyle  Back- nt to palp of cervical, thoracic, lumbar spine, nt to palp of paraspinal m., No CVA tenderness  Neuro- no focal deficits, sensation intact   Pulses- 2+ pulses at RP General-alert and oriented to person place and time, nontoxic appearing, pleasant cooperative, NAD  HEENT-normocephalic, atraumatic, NT to palp, EOMI, PERRLA,   Neck- supple, trach midline, No JVD, no LAD  Chest- Nt to palp, no reproducible pain  Cardio-s1,s2 present, regular rate and rhythm  Resp- talks in full sentences, symmetrical chest rise, CTA bilat, no evidence of wheezes, rhonchi noted  MSK- FROM of the right arm and elbow, tender to palp of skin with ecchymosis extending from the mid medial humerus extending down to the wrist, localized hematoma noted to just distal to lateral epicondyle, left sided residual weakness of left upper and lower extremity from prior stroke  Back- nt to palp of cervical, thoracic, lumbar spine, nt to palp of paraspinal m., No CVA tenderness  Neuro- no focal deficits, sensation intact   Pulses- 2+ pulses at RP

## 2021-12-31 NOTE — ED PROVIDER NOTE - PATIENT PORTAL LINK FT
You can access the FollowMyHealth Patient Portal offered by [facility] by registering at the following website: [email or web address]. By joining [employer]’s FollowMyHealth portal, you will also be able to view your health information using other applications (apps) compatible with our system.

## 2021-12-31 NOTE — ED PROVIDER NOTE - OBJECTIVE STATEMENT
80 y/o male with hx of HTN, CHF, CVA with left sided residual weakness, afib, hx of mitral valve replacement currently on coumadin 6 mg presents to the ED c/o right arm pain and bruising. Daughter over the phone explained to me that patient was getting up from his cough on 12/22 and fell on his right arm. Daughter explains he did not tell her that he fell but today she saw that he had bruising to his arm and he complained of right elbow pain. Was seen at urgent care who did an xray and r/o fractures but sent to ED for r/o dvt. Patient with full range of motion of his right arm. Denies hitting his head from the injury, denies numbness tingling, coldness    - Chriss along with daughter who discussed everything over the phone in [facility]

## 2021-12-31 NOTE — ED PROVIDER NOTE - NSFOLLOWUPINSTRUCTIONS_ED_ALL_ED_FT
1) Dolores un seguimiento con mann médico de atención primaria en los próximos 5-7 días. Llame mañana para concertar sonam prosper. Si no puede hacer un seguimiento con mann médico de atención primaria, regrese al servicio de urgencias por cualquier problema urgente.  2) Se le entregó sonam copia de las pruebas realizadas hoy. Traiga los resultados y revíselos con mann médico de atención primaria.  3) Si tiene algún empeoramiento de los síntomas o cualquier otra inquietud, regrese al servicio de urgencias de inmediato.  4) Continúe tomando alexa medicamentos caseros según las indicaciones.

## 2022-01-01 ENCOUNTER — APPOINTMENT (OUTPATIENT)
Dept: VASCULAR SURGERY | Facility: CLINIC | Age: 82
End: 2022-01-01
Payer: MEDICARE

## 2022-01-01 ENCOUNTER — APPOINTMENT (OUTPATIENT)
Dept: NEUROLOGY | Facility: CLINIC | Age: 82
End: 2022-01-01
Payer: MEDICARE

## 2022-01-01 ENCOUNTER — TRANSCRIPTION ENCOUNTER (OUTPATIENT)
Age: 82
End: 2022-01-01

## 2022-01-01 ENCOUNTER — RX RENEWAL (OUTPATIENT)
Age: 82
End: 2022-01-01

## 2022-01-01 ENCOUNTER — APPOINTMENT (OUTPATIENT)
Dept: GASTROENTEROLOGY | Facility: CLINIC | Age: 82
End: 2022-01-01
Payer: MEDICARE

## 2022-01-01 ENCOUNTER — INPATIENT (INPATIENT)
Facility: HOSPITAL | Age: 82
LOS: 6 days | DRG: 291 | End: 2022-05-18
Attending: INTERNAL MEDICINE | Admitting: INTERNAL MEDICINE
Payer: MEDICARE

## 2022-01-01 VITALS
SYSTOLIC BLOOD PRESSURE: 131 MMHG | HEIGHT: 64 IN | OXYGEN SATURATION: 98 % | HEART RATE: 66 BPM | DIASTOLIC BLOOD PRESSURE: 62 MMHG | WEIGHT: 185 LBS | TEMPERATURE: 97.3 F | RESPIRATION RATE: 16 BRPM | BODY MASS INDEX: 31.58 KG/M2

## 2022-01-01 VITALS
HEART RATE: 62 BPM | OXYGEN SATURATION: 99 % | RESPIRATION RATE: 18 BRPM | HEIGHT: 65 IN | TEMPERATURE: 98 F | DIASTOLIC BLOOD PRESSURE: 53 MMHG | WEIGHT: 214.07 LBS | SYSTOLIC BLOOD PRESSURE: 151 MMHG

## 2022-01-01 VITALS
BODY MASS INDEX: 31.76 KG/M2 | WEIGHT: 185 LBS | OXYGEN SATURATION: 97 % | DIASTOLIC BLOOD PRESSURE: 69 MMHG | SYSTOLIC BLOOD PRESSURE: 167 MMHG | TEMPERATURE: 98.1 F | HEART RATE: 68 BPM

## 2022-01-01 VITALS
OXYGEN SATURATION: 98 % | DIASTOLIC BLOOD PRESSURE: 60 MMHG | SYSTOLIC BLOOD PRESSURE: 146 MMHG | TEMPERATURE: 97.2 F | HEART RATE: 75 BPM | RESPIRATION RATE: 14 BRPM | WEIGHT: 188 LBS | HEIGHT: 64 IN | BODY MASS INDEX: 32.1 KG/M2

## 2022-01-01 VITALS — HEART RATE: 135 BPM | RESPIRATION RATE: 17 BRPM | OXYGEN SATURATION: 99 %

## 2022-01-01 DIAGNOSIS — I71.4 ABDOMINAL AORTIC ANEURYSM, W/OUT RUPTURE: ICD-10-CM

## 2022-01-01 DIAGNOSIS — K86.81 EXOCRINE PANCREATIC INSUFFICIENCY: ICD-10-CM

## 2022-01-01 DIAGNOSIS — I73.9 PERIPHERAL VASCULAR DISEASE, UNSPECIFIED: ICD-10-CM

## 2022-01-01 DIAGNOSIS — I50.33 ACUTE ON CHRONIC DIASTOLIC (CONGESTIVE) HEART FAILURE: ICD-10-CM

## 2022-01-01 DIAGNOSIS — Z87.19 PERSONAL HISTORY OF OTHER DISEASES OF THE DIGESTIVE SYSTEM: Chronic | ICD-10-CM

## 2022-01-01 DIAGNOSIS — G31.84 MILD COGNITIVE IMPAIRMENT, SO STATED: ICD-10-CM

## 2022-01-01 DIAGNOSIS — R14.3 FLATULENCE: ICD-10-CM

## 2022-01-01 DIAGNOSIS — I05.9 RHEUMATIC MITRAL VALVE DISEASE, UNSPECIFIED: Chronic | ICD-10-CM

## 2022-01-01 DIAGNOSIS — R79.1 ABNORMAL COAGULATION PROFILE: ICD-10-CM

## 2022-01-01 DIAGNOSIS — I50.9 HEART FAILURE, UNSPECIFIED: ICD-10-CM

## 2022-01-01 DIAGNOSIS — I48.20 CHRONIC ATRIAL FIBRILLATION, UNSPECIFIED: ICD-10-CM

## 2022-01-01 DIAGNOSIS — Z13.9 ENCOUNTER FOR SCREENING, UNSPECIFIED: ICD-10-CM

## 2022-01-01 DIAGNOSIS — N17.9 ACUTE KIDNEY FAILURE, UNSPECIFIED: ICD-10-CM

## 2022-01-01 DIAGNOSIS — Z95.2 PRESENCE OF PROSTHETIC HEART VALVE: ICD-10-CM

## 2022-01-01 DIAGNOSIS — R14.0 ABDOMINAL DISTENSION (GASEOUS): ICD-10-CM

## 2022-01-01 DIAGNOSIS — R19.7 DIARRHEA, UNSPECIFIED: ICD-10-CM

## 2022-01-01 DIAGNOSIS — Z95.0 PRESENCE OF CARDIAC PACEMAKER: Chronic | ICD-10-CM

## 2022-01-01 DIAGNOSIS — I10 ESSENTIAL (PRIMARY) HYPERTENSION: ICD-10-CM

## 2022-01-01 LAB
ALBUMIN SERPL ELPH-MCNC: 3.1 G/DL — LOW (ref 3.3–5.2)
ALBUMIN SERPL ELPH-MCNC: 4 G/DL — SIGNIFICANT CHANGE UP (ref 3.3–5.2)
ALBUMIN SERPL ELPH-MCNC: 4.3 G/DL
ALP BLD-CCNC: 214 U/L
ALP SERPL-CCNC: 154 U/L — HIGH (ref 40–120)
ALP SERPL-CCNC: 175 U/L — HIGH (ref 40–120)
ALT FLD-CCNC: 24 U/L — SIGNIFICANT CHANGE UP
ALT FLD-CCNC: 25 U/L — SIGNIFICANT CHANGE UP
ALT SERPL-CCNC: 31 U/L
ANION GAP SERPL CALC-SCNC: 13 MMOL/L — SIGNIFICANT CHANGE UP (ref 5–17)
ANION GAP SERPL CALC-SCNC: 13 MMOL/L — SIGNIFICANT CHANGE UP (ref 5–17)
ANION GAP SERPL CALC-SCNC: 14 MMOL/L — SIGNIFICANT CHANGE UP (ref 5–17)
ANION GAP SERPL CALC-SCNC: 15 MMOL/L — SIGNIFICANT CHANGE UP (ref 5–17)
ANION GAP SERPL CALC-SCNC: 17 MMOL/L — SIGNIFICANT CHANGE UP (ref 5–17)
ANION GAP SERPL CALC-SCNC: 17 MMOL/L — SIGNIFICANT CHANGE UP (ref 5–17)
ANION GAP SERPL CALC-SCNC: 19 MMOL/L
APTT BLD: 38.4 SEC — HIGH (ref 27.5–35.5)
APTT BLD: 47.4 SEC — HIGH (ref 27.5–35.5)
APTT BLD: 59.7 SEC — HIGH (ref 27.5–35.5)
APTT BLD: 64.9 SEC — HIGH (ref 27.5–35.5)
AST SERPL-CCNC: 51 U/L
AST SERPL-CCNC: 55 U/L — HIGH
AST SERPL-CCNC: 58 U/L — HIGH
BACTERIA STL CULT: NORMAL
BASOPHILS # BLD AUTO: 0.02 K/UL — SIGNIFICANT CHANGE UP (ref 0–0.2)
BASOPHILS # BLD AUTO: 0.03 K/UL — SIGNIFICANT CHANGE UP (ref 0–0.2)
BASOPHILS # BLD AUTO: 0.04 K/UL — SIGNIFICANT CHANGE UP (ref 0–0.2)
BASOPHILS # BLD AUTO: 0.06 K/UL
BASOPHILS NFR BLD AUTO: 0.3 % — SIGNIFICANT CHANGE UP (ref 0–2)
BASOPHILS NFR BLD AUTO: 0.4 % — SIGNIFICANT CHANGE UP (ref 0–2)
BASOPHILS NFR BLD AUTO: 0.7 % — SIGNIFICANT CHANGE UP (ref 0–2)
BASOPHILS NFR BLD AUTO: 0.9 % — SIGNIFICANT CHANGE UP (ref 0–2)
BASOPHILS NFR BLD AUTO: 1.3 %
BILIRUB SERPL-MCNC: 1 MG/DL
BILIRUB SERPL-MCNC: 1 MG/DL — SIGNIFICANT CHANGE UP (ref 0.4–2)
BILIRUB SERPL-MCNC: 1.4 MG/DL — SIGNIFICANT CHANGE UP (ref 0.4–2)
BLD GP AB SCN SERPL QL: SIGNIFICANT CHANGE UP
BUN SERPL-MCNC: 33 MG/DL
BUN SERPL-MCNC: 35.2 MG/DL — HIGH (ref 8–20)
BUN SERPL-MCNC: 35.4 MG/DL — HIGH (ref 8–20)
BUN SERPL-MCNC: 38.6 MG/DL — HIGH (ref 8–20)
BUN SERPL-MCNC: 44.8 MG/DL — HIGH (ref 8–20)
BUN SERPL-MCNC: 47.3 MG/DL — HIGH (ref 8–20)
BUN SERPL-MCNC: 48.9 MG/DL — HIGH (ref 8–20)
CALCIUM SERPL-MCNC: 8 MG/DL — LOW (ref 8.6–10.2)
CALCIUM SERPL-MCNC: 8 MG/DL — LOW (ref 8.6–10.2)
CALCIUM SERPL-MCNC: 8.3 MG/DL — LOW (ref 8.6–10.2)
CALCIUM SERPL-MCNC: 8.6 MG/DL — SIGNIFICANT CHANGE UP (ref 8.6–10.2)
CALCIUM SERPL-MCNC: 8.7 MG/DL — SIGNIFICANT CHANGE UP (ref 8.6–10.2)
CALCIUM SERPL-MCNC: 8.8 MG/DL — SIGNIFICANT CHANGE UP (ref 8.6–10.2)
CALCIUM SERPL-MCNC: 8.8 MG/DL — SIGNIFICANT CHANGE UP (ref 8.6–10.2)
CALCIUM SERPL-MCNC: 9 MG/DL — SIGNIFICANT CHANGE UP (ref 8.6–10.2)
CALCIUM SERPL-MCNC: 9.4 MG/DL
CHLORIDE SERPL-SCNC: 100 MMOL/L — SIGNIFICANT CHANGE UP (ref 98–107)
CHLORIDE SERPL-SCNC: 102 MMOL/L — SIGNIFICANT CHANGE UP (ref 98–107)
CHLORIDE SERPL-SCNC: 103 MMOL/L — SIGNIFICANT CHANGE UP (ref 98–107)
CHLORIDE SERPL-SCNC: 105 MMOL/L
CHLORIDE SERPL-SCNC: 96 MMOL/L — LOW (ref 98–107)
CHLORIDE SERPL-SCNC: 98 MMOL/L — SIGNIFICANT CHANGE UP (ref 98–107)
CHLORIDE SERPL-SCNC: 98 MMOL/L — SIGNIFICANT CHANGE UP (ref 98–107)
CHLORIDE SERPL-SCNC: 99 MMOL/L — SIGNIFICANT CHANGE UP (ref 98–107)
CHOLEST SERPL-MCNC: 97 MG/DL — SIGNIFICANT CHANGE UP
CO2 SERPL-SCNC: 18 MMOL/L
CO2 SERPL-SCNC: 21 MMOL/L — LOW (ref 22–29)
CO2 SERPL-SCNC: 21 MMOL/L — LOW (ref 22–29)
CO2 SERPL-SCNC: 24 MMOL/L — SIGNIFICANT CHANGE UP (ref 22–29)
CO2 SERPL-SCNC: 27 MMOL/L — SIGNIFICANT CHANGE UP (ref 22–29)
CO2 SERPL-SCNC: 28 MMOL/L — SIGNIFICANT CHANGE UP (ref 22–29)
CO2 SERPL-SCNC: 29 MMOL/L — SIGNIFICANT CHANGE UP (ref 22–29)
CO2 SERPL-SCNC: 30 MMOL/L — HIGH (ref 22–29)
CREAT SERPL-MCNC: 1.25 MG/DL
CREAT SERPL-MCNC: 1.33 MG/DL — HIGH (ref 0.5–1.3)
CREAT SERPL-MCNC: 1.33 MG/DL — HIGH (ref 0.5–1.3)
CREAT SERPL-MCNC: 1.37 MG/DL — HIGH (ref 0.5–1.3)
CREAT SERPL-MCNC: 1.52 MG/DL — HIGH (ref 0.5–1.3)
CREAT SERPL-MCNC: 1.65 MG/DL — HIGH (ref 0.5–1.3)
CREAT SERPL-MCNC: 1.75 MG/DL — HIGH (ref 0.5–1.3)
CREAT SERPL-MCNC: 1.84 MG/DL — HIGH (ref 0.5–1.3)
CREAT SERPL-MCNC: 1.84 MG/DL — HIGH (ref 0.5–1.3)
EGFR: 37 ML/MIN/1.73M2 — LOW
EGFR: 37 ML/MIN/1.73M2 — LOW
EGFR: 39 ML/MIN/1.73M2 — LOW
EGFR: 42 ML/MIN/1.73M2 — LOW
EGFR: 46 ML/MIN/1.73M2 — LOW
EGFR: 52 ML/MIN/1.73M2 — LOW
EGFR: 54 ML/MIN/1.73M2 — LOW
EGFR: 54 ML/MIN/1.73M2 — LOW
EGFR: 58 ML/MIN/1.73M2
EOSINOPHIL # BLD AUTO: 0 K/UL — SIGNIFICANT CHANGE UP (ref 0–0.5)
EOSINOPHIL # BLD AUTO: 0.01 K/UL — SIGNIFICANT CHANGE UP (ref 0–0.5)
EOSINOPHIL # BLD AUTO: 0.06 K/UL — SIGNIFICANT CHANGE UP (ref 0–0.5)
EOSINOPHIL # BLD AUTO: 0.07 K/UL — SIGNIFICANT CHANGE UP (ref 0–0.5)
EOSINOPHIL # BLD AUTO: 0.09 K/UL
EOSINOPHIL # BLD AUTO: 0.1 K/UL — SIGNIFICANT CHANGE UP (ref 0–0.5)
EOSINOPHIL # BLD AUTO: 0.12 K/UL — SIGNIFICANT CHANGE UP (ref 0–0.5)
EOSINOPHIL NFR BLD AUTO: 0 % — SIGNIFICANT CHANGE UP (ref 0–6)
EOSINOPHIL NFR BLD AUTO: 0.1 % — SIGNIFICANT CHANGE UP (ref 0–6)
EOSINOPHIL NFR BLD AUTO: 1.4 % — SIGNIFICANT CHANGE UP (ref 0–6)
EOSINOPHIL NFR BLD AUTO: 1.5 % — SIGNIFICANT CHANGE UP (ref 0–6)
EOSINOPHIL NFR BLD AUTO: 1.9 %
EOSINOPHIL NFR BLD AUTO: 2.2 % — SIGNIFICANT CHANGE UP (ref 0–6)
EOSINOPHIL NFR BLD AUTO: 2.9 % — SIGNIFICANT CHANGE UP (ref 0–6)
FERRITIN SERPL-MCNC: 77 NG/ML
FLUAV AG NPH QL: SIGNIFICANT CHANGE UP
FLUBV AG NPH QL: SIGNIFICANT CHANGE UP
FOLATE SERPL-MCNC: 9.1 NG/ML
G LAMBLIA AG STL QL: NORMAL
GAS PNL BLDA: SIGNIFICANT CHANGE UP
GAS PNL BLDA: SIGNIFICANT CHANGE UP
GI PCR PANEL, STOOL: NORMAL
GLUCOSE BLDC GLUCOMTR-MCNC: 188 MG/DL — HIGH (ref 70–99)
GLUCOSE SERPL-MCNC: 157 MG/DL — HIGH (ref 70–99)
GLUCOSE SERPL-MCNC: 157 MG/DL — HIGH (ref 70–99)
GLUCOSE SERPL-MCNC: 75 MG/DL
GLUCOSE SERPL-MCNC: 76 MG/DL — SIGNIFICANT CHANGE UP (ref 70–99)
GLUCOSE SERPL-MCNC: 81 MG/DL — SIGNIFICANT CHANGE UP (ref 70–99)
GLUCOSE SERPL-MCNC: 84 MG/DL — SIGNIFICANT CHANGE UP (ref 70–99)
GLUCOSE SERPL-MCNC: 85 MG/DL — SIGNIFICANT CHANGE UP (ref 70–99)
GLUCOSE SERPL-MCNC: 88 MG/DL — SIGNIFICANT CHANGE UP (ref 70–99)
GLUCOSE SERPL-MCNC: 90 MG/DL — SIGNIFICANT CHANGE UP (ref 70–99)
H PYLORI AG STL QL: NOT DETECTED
HCT VFR BLD CALC: 25.4 % — LOW (ref 39–50)
HCT VFR BLD CALC: 32.2 % — LOW (ref 39–50)
HCT VFR BLD CALC: 32.7 % — LOW (ref 39–50)
HCT VFR BLD CALC: 33.3 % — LOW (ref 39–50)
HCT VFR BLD CALC: 33.4 % — LOW (ref 39–50)
HCT VFR BLD CALC: 35 % — LOW (ref 39–50)
HCT VFR BLD CALC: 35.1 % — LOW (ref 39–50)
HCT VFR BLD CALC: 35.4 % — LOW (ref 39–50)
HCT VFR BLD CALC: 36.2 % — LOW (ref 39–50)
HCT VFR BLD CALC: 37.3 %
HDLC SERPL-MCNC: 67 MG/DL — SIGNIFICANT CHANGE UP
HGB BLD-MCNC: 10.1 G/DL — LOW (ref 13–17)
HGB BLD-MCNC: 10.2 G/DL — LOW (ref 13–17)
HGB BLD-MCNC: 10.5 G/DL — LOW (ref 13–17)
HGB BLD-MCNC: 10.5 G/DL — LOW (ref 13–17)
HGB BLD-MCNC: 10.9 G/DL — LOW (ref 13–17)
HGB BLD-MCNC: 11 G/DL — LOW (ref 13–17)
HGB BLD-MCNC: 11.1 G/DL
HGB BLD-MCNC: 11.3 G/DL — LOW (ref 13–17)
HGB BLD-MCNC: 11.8 G/DL — LOW (ref 13–17)
HGB BLD-MCNC: 7.8 G/DL — LOW (ref 13–17)
IMM GRANULOCYTES NFR BLD AUTO: 0.2 %
IMM GRANULOCYTES NFR BLD AUTO: 0.2 % — SIGNIFICANT CHANGE UP (ref 0–1.5)
IMM GRANULOCYTES NFR BLD AUTO: 0.2 % — SIGNIFICANT CHANGE UP (ref 0–1.5)
IMM GRANULOCYTES NFR BLD AUTO: 0.4 % — SIGNIFICANT CHANGE UP (ref 0–1.5)
IMM GRANULOCYTES NFR BLD AUTO: 0.5 % — SIGNIFICANT CHANGE UP (ref 0–1.5)
IMM GRANULOCYTES NFR BLD AUTO: 0.5 % — SIGNIFICANT CHANGE UP (ref 0–1.5)
IMM GRANULOCYTES NFR BLD AUTO: 1.2 % — SIGNIFICANT CHANGE UP (ref 0–1.5)
INR BLD: 1.24 RATIO — HIGH (ref 0.88–1.16)
INR BLD: 2.07 RATIO — HIGH (ref 0.88–1.16)
INR BLD: 2.16 RATIO — HIGH (ref 0.88–1.16)
INR BLD: 2.31 RATIO — HIGH (ref 0.88–1.16)
INR BLD: 2.36 RATIO — HIGH (ref 0.88–1.16)
INR BLD: 2.38 RATIO — HIGH (ref 0.88–1.16)
INR BLD: 2.43 RATIO — HIGH (ref 0.88–1.16)
INR BLD: 5.17 RATIO — CRITICAL HIGH (ref 0.88–1.16)
INR BLD: 5.91 RATIO — CRITICAL HIGH (ref 0.88–1.16)
INR PPP: 1.95 RATIO
IRON SATN MFR SERPL: 19 %
IRON SERPL-MCNC: 50 UG/DL
LACTATE SERPL-SCNC: 4.7 MMOL/L — CRITICAL HIGH (ref 0.5–2)
LIPID PNL WITH DIRECT LDL SERPL: 21 MG/DL — SIGNIFICANT CHANGE UP
LYMPHOCYTES # BLD AUTO: 0.61 K/UL — LOW (ref 1–3.3)
LYMPHOCYTES # BLD AUTO: 0.68 K/UL — LOW (ref 1–3.3)
LYMPHOCYTES # BLD AUTO: 0.69 K/UL
LYMPHOCYTES # BLD AUTO: 0.69 K/UL — LOW (ref 1–3.3)
LYMPHOCYTES # BLD AUTO: 0.86 K/UL — LOW (ref 1–3.3)
LYMPHOCYTES # BLD AUTO: 0.88 K/UL — LOW (ref 1–3.3)
LYMPHOCYTES # BLD AUTO: 0.95 K/UL — LOW (ref 1–3.3)
LYMPHOCYTES # BLD AUTO: 14 % — SIGNIFICANT CHANGE UP (ref 13–44)
LYMPHOCYTES # BLD AUTO: 14.8 % — SIGNIFICANT CHANGE UP (ref 13–44)
LYMPHOCYTES # BLD AUTO: 18.7 % — SIGNIFICANT CHANGE UP (ref 13–44)
LYMPHOCYTES # BLD AUTO: 19.6 % — SIGNIFICANT CHANGE UP (ref 13–44)
LYMPHOCYTES # BLD AUTO: 22.6 % — SIGNIFICANT CHANGE UP (ref 13–44)
LYMPHOCYTES # BLD AUTO: 7.2 % — LOW (ref 13–44)
LYMPHOCYTES NFR BLD AUTO: 14.7 %
MAGNESIUM SERPL-MCNC: 1.7 MG/DL — SIGNIFICANT CHANGE UP (ref 1.6–2.6)
MAGNESIUM SERPL-MCNC: 1.8 MG/DL — SIGNIFICANT CHANGE UP (ref 1.6–2.6)
MAGNESIUM SERPL-MCNC: 2.1 MG/DL — SIGNIFICANT CHANGE UP (ref 1.8–2.6)
MAGNESIUM SERPL-MCNC: 2.2 MG/DL — SIGNIFICANT CHANGE UP (ref 1.6–2.6)
MAN DIFF?: NORMAL
MCHC RBC-ENTMCNC: 26.3 PG — LOW (ref 27–34)
MCHC RBC-ENTMCNC: 26.3 PG — LOW (ref 27–34)
MCHC RBC-ENTMCNC: 26.4 PG — LOW (ref 27–34)
MCHC RBC-ENTMCNC: 26.5 PG — LOW (ref 27–34)
MCHC RBC-ENTMCNC: 26.6 PG
MCHC RBC-ENTMCNC: 26.6 PG — LOW (ref 27–34)
MCHC RBC-ENTMCNC: 26.6 PG — LOW (ref 27–34)
MCHC RBC-ENTMCNC: 26.7 PG — LOW (ref 27–34)
MCHC RBC-ENTMCNC: 26.7 PG — LOW (ref 27–34)
MCHC RBC-ENTMCNC: 29.7 PG — SIGNIFICANT CHANGE UP (ref 27–34)
MCHC RBC-ENTMCNC: 29.8 GM/DL
MCHC RBC-ENTMCNC: 30.7 GM/DL — LOW (ref 32–36)
MCHC RBC-ENTMCNC: 30.9 GM/DL — LOW (ref 32–36)
MCHC RBC-ENTMCNC: 31.1 GM/DL — LOW (ref 32–36)
MCHC RBC-ENTMCNC: 31.2 GM/DL — LOW (ref 32–36)
MCHC RBC-ENTMCNC: 31.4 GM/DL — LOW (ref 32–36)
MCHC RBC-ENTMCNC: 31.4 GM/DL — LOW (ref 32–36)
MCHC RBC-ENTMCNC: 31.5 GM/DL — LOW (ref 32–36)
MCHC RBC-ENTMCNC: 31.7 GM/DL — LOW (ref 32–36)
MCHC RBC-ENTMCNC: 33.3 GM/DL — SIGNIFICANT CHANGE UP (ref 32–36)
MCV RBC AUTO: 83.6 FL — SIGNIFICANT CHANGE UP (ref 80–100)
MCV RBC AUTO: 83.7 FL — SIGNIFICANT CHANGE UP (ref 80–100)
MCV RBC AUTO: 84.1 FL — SIGNIFICANT CHANGE UP (ref 80–100)
MCV RBC AUTO: 84.4 FL — SIGNIFICANT CHANGE UP (ref 80–100)
MCV RBC AUTO: 84.5 FL — SIGNIFICANT CHANGE UP (ref 80–100)
MCV RBC AUTO: 86 FL — SIGNIFICANT CHANGE UP (ref 80–100)
MCV RBC AUTO: 86.1 FL — SIGNIFICANT CHANGE UP (ref 80–100)
MCV RBC AUTO: 87 FL — SIGNIFICANT CHANGE UP (ref 80–100)
MCV RBC AUTO: 89.2 FL
MCV RBC AUTO: 89.2 FL — SIGNIFICANT CHANGE UP (ref 80–100)
MONOCYTES # BLD AUTO: 0.23 K/UL — SIGNIFICANT CHANGE UP (ref 0–0.9)
MONOCYTES # BLD AUTO: 0.48 K/UL — SIGNIFICANT CHANGE UP (ref 0–0.9)
MONOCYTES # BLD AUTO: 0.52 K/UL — SIGNIFICANT CHANGE UP (ref 0–0.9)
MONOCYTES # BLD AUTO: 0.53 K/UL — SIGNIFICANT CHANGE UP (ref 0–0.9)
MONOCYTES # BLD AUTO: 0.54 K/UL
MONOCYTES # BLD AUTO: 0.63 K/UL — SIGNIFICANT CHANGE UP (ref 0–0.9)
MONOCYTES # BLD AUTO: 0.7 K/UL — SIGNIFICANT CHANGE UP (ref 0–0.9)
MONOCYTES NFR BLD AUTO: 11 % — SIGNIFICANT CHANGE UP (ref 2–14)
MONOCYTES NFR BLD AUTO: 11.5 %
MONOCYTES NFR BLD AUTO: 11.8 % — SIGNIFICANT CHANGE UP (ref 2–14)
MONOCYTES NFR BLD AUTO: 12.4 % — SIGNIFICANT CHANGE UP (ref 2–14)
MONOCYTES NFR BLD AUTO: 13.5 % — SIGNIFICANT CHANGE UP (ref 2–14)
MONOCYTES NFR BLD AUTO: 5 % — SIGNIFICANT CHANGE UP (ref 2–14)
MONOCYTES NFR BLD AUTO: 7.4 % — SIGNIFICANT CHANGE UP (ref 2–14)
NEUTROPHILS # BLD AUTO: 2.57 K/UL — SIGNIFICANT CHANGE UP (ref 1.8–7.4)
NEUTROPHILS # BLD AUTO: 2.94 K/UL — SIGNIFICANT CHANGE UP (ref 1.8–7.4)
NEUTROPHILS # BLD AUTO: 3.16 K/UL — SIGNIFICANT CHANGE UP (ref 1.8–7.4)
NEUTROPHILS # BLD AUTO: 3.22 K/UL — SIGNIFICANT CHANGE UP (ref 1.8–7.4)
NEUTROPHILS # BLD AUTO: 3.31 K/UL
NEUTROPHILS # BLD AUTO: 3.48 K/UL — SIGNIFICANT CHANGE UP (ref 1.8–7.4)
NEUTROPHILS # BLD AUTO: 7.96 K/UL — HIGH (ref 1.8–7.4)
NEUTROPHILS NFR BLD AUTO: 60.9 % — SIGNIFICANT CHANGE UP (ref 43–77)
NEUTROPHILS NFR BLD AUTO: 65.5 % — SIGNIFICANT CHANGE UP (ref 43–77)
NEUTROPHILS NFR BLD AUTO: 69.1 % — SIGNIFICANT CHANGE UP (ref 43–77)
NEUTROPHILS NFR BLD AUTO: 70.4 %
NEUTROPHILS NFR BLD AUTO: 72.4 % — SIGNIFICANT CHANGE UP (ref 43–77)
NEUTROPHILS NFR BLD AUTO: 75.5 % — SIGNIFICANT CHANGE UP (ref 43–77)
NEUTROPHILS NFR BLD AUTO: 83.8 % — HIGH (ref 43–77)
NON HDL CHOLESTEROL: 30 MG/DL — SIGNIFICANT CHANGE UP
NT-PROBNP SERPL-SCNC: 3199 PG/ML — HIGH (ref 0–300)
NT-PROBNP SERPL-SCNC: 5402 PG/ML — HIGH (ref 0–300)
PANCREATIC ELASTASE, FECAL: 102
PHOSPHATE SERPL-MCNC: 3.2 MG/DL — SIGNIFICANT CHANGE UP (ref 2.4–4.7)
PHOSPHATE SERPL-MCNC: 3.5 MG/DL — SIGNIFICANT CHANGE UP (ref 2.4–4.7)
PHOSPHATE SERPL-MCNC: 3.8 MG/DL — SIGNIFICANT CHANGE UP (ref 2.4–4.7)
PHOSPHATE SERPL-MCNC: 3.9 MG/DL — SIGNIFICANT CHANGE UP (ref 2.4–4.7)
PLATELET # BLD AUTO: 118 K/UL — LOW (ref 150–400)
PLATELET # BLD AUTO: 139 K/UL — LOW (ref 150–400)
PLATELET # BLD AUTO: 147 K/UL — LOW (ref 150–400)
PLATELET # BLD AUTO: 151 K/UL — SIGNIFICANT CHANGE UP (ref 150–400)
PLATELET # BLD AUTO: 155 K/UL — SIGNIFICANT CHANGE UP (ref 150–400)
PLATELET # BLD AUTO: 158 K/UL — SIGNIFICANT CHANGE UP (ref 150–400)
PLATELET # BLD AUTO: 160 K/UL
PLATELET # BLD AUTO: 160 K/UL — SIGNIFICANT CHANGE UP (ref 150–400)
PLATELET # BLD AUTO: 168 K/UL — SIGNIFICANT CHANGE UP (ref 150–400)
PLATELET # BLD AUTO: 172 K/UL — SIGNIFICANT CHANGE UP (ref 150–400)
POTASSIUM SERPL-MCNC: 3.7 MMOL/L — SIGNIFICANT CHANGE UP (ref 3.5–5.3)
POTASSIUM SERPL-MCNC: 3.7 MMOL/L — SIGNIFICANT CHANGE UP (ref 3.5–5.3)
POTASSIUM SERPL-MCNC: 3.8 MMOL/L — SIGNIFICANT CHANGE UP (ref 3.5–5.3)
POTASSIUM SERPL-MCNC: 4.2 MMOL/L — SIGNIFICANT CHANGE UP (ref 3.5–5.3)
POTASSIUM SERPL-MCNC: 4.2 MMOL/L — SIGNIFICANT CHANGE UP (ref 3.5–5.3)
POTASSIUM SERPL-MCNC: 4.4 MMOL/L — SIGNIFICANT CHANGE UP (ref 3.5–5.3)
POTASSIUM SERPL-MCNC: 4.4 MMOL/L — SIGNIFICANT CHANGE UP (ref 3.5–5.3)
POTASSIUM SERPL-MCNC: 4.5 MMOL/L — SIGNIFICANT CHANGE UP (ref 3.5–5.3)
POTASSIUM SERPL-MCNC: 4.8 MMOL/L — SIGNIFICANT CHANGE UP (ref 3.5–5.3)
POTASSIUM SERPL-SCNC: 3.7 MMOL/L — SIGNIFICANT CHANGE UP (ref 3.5–5.3)
POTASSIUM SERPL-SCNC: 3.7 MMOL/L — SIGNIFICANT CHANGE UP (ref 3.5–5.3)
POTASSIUM SERPL-SCNC: 3.8 MMOL/L — SIGNIFICANT CHANGE UP (ref 3.5–5.3)
POTASSIUM SERPL-SCNC: 4.2 MMOL/L — SIGNIFICANT CHANGE UP (ref 3.5–5.3)
POTASSIUM SERPL-SCNC: 4.2 MMOL/L — SIGNIFICANT CHANGE UP (ref 3.5–5.3)
POTASSIUM SERPL-SCNC: 4.4 MMOL/L — SIGNIFICANT CHANGE UP (ref 3.5–5.3)
POTASSIUM SERPL-SCNC: 4.4 MMOL/L — SIGNIFICANT CHANGE UP (ref 3.5–5.3)
POTASSIUM SERPL-SCNC: 4.5 MMOL/L — SIGNIFICANT CHANGE UP (ref 3.5–5.3)
POTASSIUM SERPL-SCNC: 4.7 MMOL/L
POTASSIUM SERPL-SCNC: 4.8 MMOL/L — SIGNIFICANT CHANGE UP (ref 3.5–5.3)
PROT SERPL-MCNC: 6.2 G/DL — LOW (ref 6.6–8.7)
PROT SERPL-MCNC: 7.4 G/DL — SIGNIFICANT CHANGE UP (ref 6.6–8.7)
PROT SERPL-MCNC: 7.5 G/DL
PROTHROM AB SERPL-ACNC: 14.4 SEC — HIGH (ref 10.5–13.4)
PROTHROM AB SERPL-ACNC: 24.2 SEC — HIGH (ref 10.5–13.4)
PROTHROM AB SERPL-ACNC: 25.3 SEC — HIGH (ref 10.5–13.4)
PROTHROM AB SERPL-ACNC: 27 SEC — HIGH (ref 10.5–13.4)
PROTHROM AB SERPL-ACNC: 27.6 SEC — HIGH (ref 10.5–13.4)
PROTHROM AB SERPL-ACNC: 27.9 SEC — HIGH (ref 10.5–13.4)
PROTHROM AB SERPL-ACNC: 28.5 SEC — HIGH (ref 10.5–13.4)
PROTHROM AB SERPL-ACNC: 61 SEC — HIGH (ref 10.5–13.4)
PROTHROM AB SERPL-ACNC: 69.8 SEC — HIGH (ref 10.5–13.4)
PT BLD: 23 SEC
RBC # BLD: 2.92 M/UL — LOW (ref 4.2–5.8)
RBC # BLD: 3.8 M/UL — LOW (ref 4.2–5.8)
RBC # BLD: 3.85 M/UL — LOW (ref 4.2–5.8)
RBC # BLD: 3.94 M/UL — LOW (ref 4.2–5.8)
RBC # BLD: 3.97 M/UL — LOW (ref 4.2–5.8)
RBC # BLD: 3.97 M/UL — LOW (ref 4.2–5.8)
RBC # BLD: 4.08 M/UL — LOW (ref 4.2–5.8)
RBC # BLD: 4.18 M/UL
RBC # BLD: 4.18 M/UL — LOW (ref 4.2–5.8)
RBC # BLD: 4.29 M/UL — SIGNIFICANT CHANGE UP (ref 4.2–5.8)
RBC # FLD: 15.2 % — HIGH (ref 10.3–14.5)
RBC # FLD: 16.6 %
RBC # FLD: 17.2 % — HIGH (ref 10.3–14.5)
RBC # FLD: 17.2 % — HIGH (ref 10.3–14.5)
RBC # FLD: 17.3 % — HIGH (ref 10.3–14.5)
RBC # FLD: 17.4 % — HIGH (ref 10.3–14.5)
RBC # FLD: 17.4 % — HIGH (ref 10.3–14.5)
RBC # FLD: 17.5 % — HIGH (ref 10.3–14.5)
RBC # FLD: 17.6 % — HIGH (ref 10.3–14.5)
RBC # FLD: 17.7 % — HIGH (ref 10.3–14.5)
RSV RNA NPH QL NAA+NON-PROBE: SIGNIFICANT CHANGE UP
SARS-COV-2 RNA SPEC QL NAA+PROBE: SIGNIFICANT CHANGE UP
SARS-COV-2 RNA SPEC QL NAA+PROBE: SIGNIFICANT CHANGE UP
SODIUM SERPL-SCNC: 137 MMOL/L — SIGNIFICANT CHANGE UP (ref 135–145)
SODIUM SERPL-SCNC: 137 MMOL/L — SIGNIFICANT CHANGE UP (ref 135–145)
SODIUM SERPL-SCNC: 138 MMOL/L — SIGNIFICANT CHANGE UP (ref 135–145)
SODIUM SERPL-SCNC: 139 MMOL/L — SIGNIFICANT CHANGE UP (ref 135–145)
SODIUM SERPL-SCNC: 140 MMOL/L — SIGNIFICANT CHANGE UP (ref 135–145)
SODIUM SERPL-SCNC: 141 MMOL/L — SIGNIFICANT CHANGE UP (ref 135–145)
SODIUM SERPL-SCNC: 142 MMOL/L
SODIUM SERPL-SCNC: 142 MMOL/L — SIGNIFICANT CHANGE UP (ref 135–145)
SODIUM SERPL-SCNC: 143 MMOL/L — SIGNIFICANT CHANGE UP (ref 135–145)
SODIUM SERPL-SCNC: 143 MMOL/L — SIGNIFICANT CHANGE UP (ref 135–145)
TIBC SERPL-MCNC: 268 UG/DL
TRIGL SERPL-MCNC: 47 MG/DL — SIGNIFICANT CHANGE UP
TROPONIN T SERPL-MCNC: 0.01 NG/ML — SIGNIFICANT CHANGE UP (ref 0–0.06)
TROPONIN T SERPL-MCNC: <0.01 NG/ML — SIGNIFICANT CHANGE UP (ref 0–0.06)
TROPONIN T SERPL-MCNC: <0.01 NG/ML — SIGNIFICANT CHANGE UP (ref 0–0.06)
UIBC SERPL-MCNC: 218 UG/DL
VIT B12 SERPL-MCNC: 799 PG/ML
WBC # BLD: 4.09 K/UL — SIGNIFICANT CHANGE UP (ref 3.8–10.5)
WBC # BLD: 4.21 K/UL — SIGNIFICANT CHANGE UP (ref 3.8–10.5)
WBC # BLD: 4.36 K/UL — SIGNIFICANT CHANGE UP (ref 3.8–10.5)
WBC # BLD: 4.49 K/UL — SIGNIFICANT CHANGE UP (ref 3.8–10.5)
WBC # BLD: 4.61 K/UL — SIGNIFICANT CHANGE UP (ref 3.8–10.5)
WBC # BLD: 4.66 K/UL — SIGNIFICANT CHANGE UP (ref 3.8–10.5)
WBC # BLD: 5.16 K/UL — SIGNIFICANT CHANGE UP (ref 3.8–10.5)
WBC # BLD: 6.89 K/UL — SIGNIFICANT CHANGE UP (ref 3.8–10.5)
WBC # BLD: 9.49 K/UL — SIGNIFICANT CHANGE UP (ref 3.8–10.5)
WBC # FLD AUTO: 4.09 K/UL — SIGNIFICANT CHANGE UP (ref 3.8–10.5)
WBC # FLD AUTO: 4.21 K/UL — SIGNIFICANT CHANGE UP (ref 3.8–10.5)
WBC # FLD AUTO: 4.36 K/UL — SIGNIFICANT CHANGE UP (ref 3.8–10.5)
WBC # FLD AUTO: 4.49 K/UL — SIGNIFICANT CHANGE UP (ref 3.8–10.5)
WBC # FLD AUTO: 4.61 K/UL — SIGNIFICANT CHANGE UP (ref 3.8–10.5)
WBC # FLD AUTO: 4.66 K/UL — SIGNIFICANT CHANGE UP (ref 3.8–10.5)
WBC # FLD AUTO: 4.7 K/UL
WBC # FLD AUTO: 5.16 K/UL — SIGNIFICANT CHANGE UP (ref 3.8–10.5)
WBC # FLD AUTO: 6.89 K/UL — SIGNIFICANT CHANGE UP (ref 3.8–10.5)
WBC # FLD AUTO: 9.49 K/UL — SIGNIFICANT CHANGE UP (ref 3.8–10.5)

## 2022-01-01 PROCEDURE — 80053 COMPREHEN METABOLIC PANEL: CPT

## 2022-01-01 PROCEDURE — 85018 HEMOGLOBIN: CPT

## 2022-01-01 PROCEDURE — 71045 X-RAY EXAM CHEST 1 VIEW: CPT | Mod: 26

## 2022-01-01 PROCEDURE — 94640 AIRWAY INHALATION TREATMENT: CPT

## 2022-01-01 PROCEDURE — 99233 SBSQ HOSP IP/OBS HIGH 50: CPT

## 2022-01-01 PROCEDURE — 99222 1ST HOSP IP/OBS MODERATE 55: CPT

## 2022-01-01 PROCEDURE — 93005 ELECTROCARDIOGRAM TRACING: CPT

## 2022-01-01 PROCEDURE — 85027 COMPLETE CBC AUTOMATED: CPT

## 2022-01-01 PROCEDURE — 99223 1ST HOSP IP/OBS HIGH 75: CPT

## 2022-01-01 PROCEDURE — 82435 ASSAY OF BLOOD CHLORIDE: CPT

## 2022-01-01 PROCEDURE — 82947 ASSAY GLUCOSE BLOOD QUANT: CPT

## 2022-01-01 PROCEDURE — 82330 ASSAY OF CALCIUM: CPT

## 2022-01-01 PROCEDURE — 99213 OFFICE O/P EST LOW 20 MIN: CPT

## 2022-01-01 PROCEDURE — 71045 X-RAY EXAM CHEST 1 VIEW: CPT

## 2022-01-01 PROCEDURE — 83605 ASSAY OF LACTIC ACID: CPT

## 2022-01-01 PROCEDURE — 99232 SBSQ HOSP IP/OBS MODERATE 35: CPT

## 2022-01-01 PROCEDURE — 76942 ECHO GUIDE FOR BIOPSY: CPT

## 2022-01-01 PROCEDURE — 94760 N-INVAS EAR/PLS OXIMETRY 1: CPT

## 2022-01-01 PROCEDURE — U0005: CPT

## 2022-01-01 PROCEDURE — 85730 THROMBOPLASTIN TIME PARTIAL: CPT

## 2022-01-01 PROCEDURE — 76775 US EXAM ABDO BACK WALL LIM: CPT | Mod: 26

## 2022-01-01 PROCEDURE — 80061 LIPID PANEL: CPT

## 2022-01-01 PROCEDURE — 86900 BLOOD TYPING SEROLOGIC ABO: CPT

## 2022-01-01 PROCEDURE — P9016: CPT

## 2022-01-01 PROCEDURE — 82962 GLUCOSE BLOOD TEST: CPT

## 2022-01-01 PROCEDURE — 99204 OFFICE O/P NEW MOD 45 MIN: CPT

## 2022-01-01 PROCEDURE — 93279 PRGRMG DEV EVAL PM/LDLS PM: CPT | Mod: 26

## 2022-01-01 PROCEDURE — 85025 COMPLETE CBC W/AUTO DIFF WBC: CPT

## 2022-01-01 PROCEDURE — U0003: CPT

## 2022-01-01 PROCEDURE — 71045 X-RAY EXAM CHEST 1 VIEW: CPT | Mod: 26,76

## 2022-01-01 PROCEDURE — 87637 SARSCOV2&INF A&B&RSV AMP PRB: CPT

## 2022-01-01 PROCEDURE — 74176 CT ABD & PELVIS W/O CONTRAST: CPT | Mod: 26

## 2022-01-01 PROCEDURE — P9059: CPT

## 2022-01-01 PROCEDURE — 36140 INTRO NDL ICATH UPR/LXTR ART: CPT | Mod: 59

## 2022-01-01 PROCEDURE — 36430 TRANSFUSION BLD/BLD COMPNT: CPT

## 2022-01-01 PROCEDURE — 74018 RADEX ABDOMEN 1 VIEW: CPT

## 2022-01-01 PROCEDURE — 86850 RBC ANTIBODY SCREEN: CPT

## 2022-01-01 PROCEDURE — 75774 ARTERY X-RAY EACH VESSEL: CPT

## 2022-01-01 PROCEDURE — P9100: CPT

## 2022-01-01 PROCEDURE — 93306 TTE W/DOPPLER COMPLETE: CPT

## 2022-01-01 PROCEDURE — 85610 PROTHROMBIN TIME: CPT

## 2022-01-01 PROCEDURE — 74176 CT ABD & PELVIS W/O CONTRAST: CPT

## 2022-01-01 PROCEDURE — 83880 ASSAY OF NATRIURETIC PEPTIDE: CPT

## 2022-01-01 PROCEDURE — 99203 OFFICE O/P NEW LOW 30 MIN: CPT

## 2022-01-01 PROCEDURE — 75710 ARTERY X-RAYS ARM/LEG: CPT | Mod: 26

## 2022-01-01 PROCEDURE — 99285 EMERGENCY DEPT VISIT HI MDM: CPT | Mod: 25

## 2022-01-01 PROCEDURE — 76937 US GUIDE VASCULAR ACCESS: CPT | Mod: 26,59

## 2022-01-01 PROCEDURE — 86923 COMPATIBILITY TEST ELECTRIC: CPT

## 2022-01-01 PROCEDURE — 84100 ASSAY OF PHOSPHORUS: CPT

## 2022-01-01 PROCEDURE — 85014 HEMATOCRIT: CPT

## 2022-01-01 PROCEDURE — 76775 US EXAM ABDO BACK WALL LIM: CPT

## 2022-01-01 PROCEDURE — 93010 ELECTROCARDIOGRAM REPORT: CPT

## 2022-01-01 PROCEDURE — 80051 ELECTROLYTE PANEL: CPT

## 2022-01-01 PROCEDURE — P9037: CPT

## 2022-01-01 PROCEDURE — 86901 BLOOD TYPING SEROLOGIC RH(D): CPT

## 2022-01-01 PROCEDURE — 99285 EMERGENCY DEPT VISIT HI MDM: CPT

## 2022-01-01 PROCEDURE — 93970 EXTREMITY STUDY: CPT

## 2022-01-01 PROCEDURE — 80048 BASIC METABOLIC PNL TOTAL CA: CPT

## 2022-01-01 PROCEDURE — 84295 ASSAY OF SERUM SODIUM: CPT

## 2022-01-01 PROCEDURE — 83735 ASSAY OF MAGNESIUM: CPT

## 2022-01-01 PROCEDURE — 84484 ASSAY OF TROPONIN QUANT: CPT

## 2022-01-01 PROCEDURE — 75726 ARTERY X-RAYS ABDOMEN: CPT

## 2022-01-01 PROCEDURE — 82803 BLOOD GASES ANY COMBINATION: CPT

## 2022-01-01 PROCEDURE — 74018 RADEX ABDOMEN 1 VIEW: CPT | Mod: 26

## 2022-01-01 PROCEDURE — 36415 COLL VENOUS BLD VENIPUNCTURE: CPT

## 2022-01-01 PROCEDURE — 84132 ASSAY OF SERUM POTASSIUM: CPT

## 2022-01-01 RX ORDER — ACETAMINOPHEN 500 MG
650 TABLET ORAL ONCE
Refills: 0 | Status: COMPLETED | OUTPATIENT
Start: 2022-01-01 | End: 2022-01-01

## 2022-01-01 RX ORDER — METOPROLOL TARTRATE 50 MG
25 TABLET ORAL
Refills: 0 | Status: DISCONTINUED | OUTPATIENT
Start: 2022-01-01 | End: 2022-01-01

## 2022-01-01 RX ORDER — ROSUVASTATIN CALCIUM 5 MG/1
1 TABLET ORAL
Qty: 0 | Refills: 0 | DISCHARGE

## 2022-01-01 RX ORDER — PROTHROMBIN COMPLEX CONCENTRATE (HUMAN) 25.5; 16.5; 24; 22; 22; 26 [IU]/ML; [IU]/ML; [IU]/ML; [IU]/ML; [IU]/ML; [IU]/ML
1500 POWDER, FOR SOLUTION INTRAVENOUS ONCE
Refills: 0 | Status: DISCONTINUED | OUTPATIENT
Start: 2022-01-01 | End: 2022-01-01

## 2022-01-01 RX ORDER — LEVOTHYROXINE SODIUM 125 MCG
1 TABLET ORAL
Qty: 0 | Refills: 0 | DISCHARGE

## 2022-01-01 RX ORDER — ATORVASTATIN CALCIUM 80 MG/1
20 TABLET, FILM COATED ORAL AT BEDTIME
Refills: 0 | Status: DISCONTINUED | OUTPATIENT
Start: 2022-01-01 | End: 2022-01-01

## 2022-01-01 RX ORDER — PANCRELIPASE 36000; 180000; 114000 [USP'U]/1; [USP'U]/1; [USP'U]/1
36000-114000 CAPSULE, DELAYED RELEASE PELLETS ORAL
Qty: 270 | Refills: 2 | Status: ACTIVE | COMMUNITY
Start: 2022-01-01 | End: 1900-01-01

## 2022-01-01 RX ORDER — ZINC SULFATE TAB 220 MG (50 MG ZINC EQUIVALENT) 220 (50 ZN) MG
0 TAB ORAL
Qty: 0 | Refills: 0 | DISCHARGE

## 2022-01-01 RX ORDER — HYDROMORPHONE HYDROCHLORIDE 2 MG/ML
0.5 INJECTION INTRAMUSCULAR; INTRAVENOUS; SUBCUTANEOUS ONCE
Refills: 0 | Status: DISCONTINUED | OUTPATIENT
Start: 2022-01-01 | End: 2022-01-01

## 2022-01-01 RX ORDER — DONEPEZIL HYDROCHLORIDE 5 MG/1
5 TABLET ORAL
Qty: 30 | Refills: 2 | Status: ACTIVE | COMMUNITY
Start: 2022-01-01 | End: 1900-01-01

## 2022-01-01 RX ORDER — PHYTONADIONE (VIT K1) 5 MG
10 TABLET ORAL ONCE
Refills: 0 | Status: COMPLETED | OUTPATIENT
Start: 2022-01-01 | End: 2022-01-01

## 2022-01-01 RX ORDER — PHYTONADIONE (VIT K1) 5 MG
5 TABLET ORAL ONCE
Refills: 0 | Status: DISCONTINUED | OUTPATIENT
Start: 2022-01-01 | End: 2022-01-01

## 2022-01-01 RX ORDER — PROTHROMBIN COMPLEX CONCENTRATE (HUMAN) 25.5; 16.5; 24; 22; 22; 26 [IU]/ML; [IU]/ML; [IU]/ML; [IU]/ML; [IU]/ML; [IU]/ML
2000 POWDER, FOR SOLUTION INTRAVENOUS ONCE
Refills: 0 | Status: COMPLETED | OUTPATIENT
Start: 2022-01-01 | End: 2022-01-01

## 2022-01-01 RX ORDER — PIPERACILLIN AND TAZOBACTAM 4; .5 G/20ML; G/20ML
3.38 INJECTION, POWDER, LYOPHILIZED, FOR SOLUTION INTRAVENOUS EVERY 8 HOURS
Refills: 0 | Status: DISCONTINUED | OUTPATIENT
Start: 2022-01-01 | End: 2022-01-01

## 2022-01-01 RX ORDER — FENTANYL CITRATE 50 UG/ML
3 INJECTION INTRAVENOUS
Qty: 2500 | Refills: 0 | Status: DISCONTINUED | OUTPATIENT
Start: 2022-01-01 | End: 2022-01-01

## 2022-01-01 RX ORDER — ROSUVASTATIN CALCIUM 5 MG/1
5 TABLET, FILM COATED ORAL
Refills: 0 | Status: ACTIVE | COMMUNITY

## 2022-01-01 RX ORDER — ENOXAPARIN SODIUM 100 MG/ML
100 INJECTION SUBCUTANEOUS EVERY 12 HOURS
Refills: 0 | Status: DISCONTINUED | OUTPATIENT
Start: 2022-01-01 | End: 2022-01-01

## 2022-01-01 RX ORDER — DIGOXIN 250 MCG
1 TABLET ORAL
Qty: 0 | Refills: 0 | DISCHARGE

## 2022-01-01 RX ORDER — BENZOCAINE AND MENTHOL 5; 1 G/100ML; G/100ML
1 LIQUID ORAL ONCE
Refills: 0 | Status: COMPLETED | OUTPATIENT
Start: 2022-01-01 | End: 2022-01-01

## 2022-01-01 RX ORDER — PHYTONADIONE (VIT K1) 5 MG
2.5 TABLET ORAL ONCE
Refills: 0 | Status: DISCONTINUED | OUTPATIENT
Start: 2022-01-01 | End: 2022-01-01

## 2022-01-01 RX ORDER — CHLORHEXIDINE GLUCONATE 213 G/1000ML
1 SOLUTION TOPICAL
Refills: 0 | Status: DISCONTINUED | OUTPATIENT
Start: 2022-01-01 | End: 2022-01-01

## 2022-01-01 RX ORDER — VASOPRESSIN 20 [USP'U]/ML
0.04 INJECTION INTRAVENOUS
Qty: 50 | Refills: 0 | Status: DISCONTINUED | OUTPATIENT
Start: 2022-01-01 | End: 2022-01-01

## 2022-01-01 RX ORDER — DIGOXIN 250 MCG
125 TABLET ORAL DAILY
Refills: 0 | Status: DISCONTINUED | OUTPATIENT
Start: 2022-01-01 | End: 2022-01-01

## 2022-01-01 RX ORDER — FUROSEMIDE 40 MG
40 TABLET ORAL ONCE
Refills: 0 | Status: COMPLETED | OUTPATIENT
Start: 2022-01-01 | End: 2022-01-01

## 2022-01-01 RX ORDER — METOPROLOL TARTRATE 50 MG
1 TABLET ORAL
Qty: 0 | Refills: 0 | DISCHARGE

## 2022-01-01 RX ORDER — SODIUM BICARBONATE 1 MEQ/ML
150 SYRINGE (ML) INTRAVENOUS ONCE
Refills: 0 | Status: COMPLETED | OUTPATIENT
Start: 2022-01-01 | End: 2022-01-01

## 2022-01-01 RX ORDER — HYDRALAZINE HCL 50 MG
1 TABLET ORAL
Qty: 0 | Refills: 0 | DISCHARGE

## 2022-01-01 RX ORDER — FUROSEMIDE 40 MG
40 TABLET ORAL
Refills: 0 | Status: DISCONTINUED | OUTPATIENT
Start: 2022-01-01 | End: 2022-01-01

## 2022-01-01 RX ORDER — MORPHINE SULFATE 50 MG/1
2 CAPSULE, EXTENDED RELEASE ORAL ONCE
Refills: 0 | Status: DISCONTINUED | OUTPATIENT
Start: 2022-01-01 | End: 2022-01-01

## 2022-01-01 RX ORDER — ENOXAPARIN SODIUM 100 MG/ML
90 INJECTION SUBCUTANEOUS EVERY 12 HOURS
Refills: 0 | Status: DISCONTINUED | OUTPATIENT
Start: 2022-01-01 | End: 2022-01-01

## 2022-01-01 RX ORDER — ENOXAPARIN SODIUM 100 MG/ML
100 INJECTION SUBCUTANEOUS ONCE
Refills: 0 | Status: COMPLETED | OUTPATIENT
Start: 2022-01-01 | End: 2022-01-01

## 2022-01-01 RX ORDER — SODIUM CHLORIDE 9 MG/ML
1000 INJECTION INTRAMUSCULAR; INTRAVENOUS; SUBCUTANEOUS ONCE
Refills: 0 | Status: COMPLETED | OUTPATIENT
Start: 2022-01-01 | End: 2022-01-01

## 2022-01-01 RX ORDER — WARFARIN SODIUM 2.5 MG/1
1 TABLET ORAL
Qty: 0 | Refills: 0 | DISCHARGE

## 2022-01-01 RX ORDER — SPIRONOLACTONE 25 MG/1
25 TABLET, FILM COATED ORAL DAILY
Refills: 0 | Status: DISCONTINUED | OUTPATIENT
Start: 2022-01-01 | End: 2022-01-01

## 2022-01-01 RX ORDER — LISINOPRIL 2.5 MG/1
1 TABLET ORAL
Qty: 0 | Refills: 0 | DISCHARGE

## 2022-01-01 RX ORDER — IPRATROPIUM/ALBUTEROL SULFATE 18-103MCG
3 AEROSOL WITH ADAPTER (GRAM) INHALATION EVERY 6 HOURS
Refills: 0 | Status: DISCONTINUED | OUTPATIENT
Start: 2022-01-01 | End: 2022-01-01

## 2022-01-01 RX ORDER — FUROSEMIDE 40 MG
20 TABLET ORAL DAILY
Refills: 0 | Status: DISCONTINUED | OUTPATIENT
Start: 2022-01-01 | End: 2022-01-01

## 2022-01-01 RX ORDER — LIPASE/PROTEASE/AMYLASE 16-48-48K
1 CAPSULE,DELAYED RELEASE (ENTERIC COATED) ORAL
Qty: 0 | Refills: 0 | DISCHARGE

## 2022-01-01 RX ORDER — ALBUTEROL 90 UG/1
2 AEROSOL, METERED ORAL EVERY 6 HOURS
Refills: 0 | Status: DISCONTINUED | OUTPATIENT
Start: 2022-01-01 | End: 2022-01-01

## 2022-01-01 RX ORDER — DONEPEZIL HYDROCHLORIDE 10 MG/1
1 TABLET, FILM COATED ORAL
Qty: 0 | Refills: 0 | DISCHARGE

## 2022-01-01 RX ORDER — NOREPINEPHRINE BITARTRATE/D5W 8 MG/250ML
0.05 PLASTIC BAG, INJECTION (ML) INTRAVENOUS
Qty: 16 | Refills: 0 | Status: DISCONTINUED | OUTPATIENT
Start: 2022-01-01 | End: 2022-01-01

## 2022-01-01 RX ORDER — HYDRALAZINE HYDROCHLORIDE 50 MG/1
50 TABLET ORAL
Refills: 0 | Status: ACTIVE | COMMUNITY

## 2022-01-01 RX ORDER — SIMETHICONE 80 MG/1
160 TABLET, CHEWABLE ORAL ONCE
Refills: 0 | Status: COMPLETED | OUTPATIENT
Start: 2022-01-01 | End: 2022-01-01

## 2022-01-01 RX ORDER — AMOXICILLIN AND CLAVULANATE POTASSIUM 875; 125 MG/1; MG/1
875-125 TABLET, COATED ORAL
Qty: 20 | Refills: 0 | Status: DISCONTINUED | COMMUNITY
Start: 2021-01-01 | End: 2022-01-01

## 2022-01-01 RX ORDER — PIPERACILLIN AND TAZOBACTAM 4; .5 G/20ML; G/20ML
3.38 INJECTION, POWDER, LYOPHILIZED, FOR SOLUTION INTRAVENOUS ONCE
Refills: 0 | Status: DISCONTINUED | OUTPATIENT
Start: 2022-01-01 | End: 2022-01-01

## 2022-01-01 RX ORDER — LISINOPRIL 2.5 MG/1
20 TABLET ORAL DAILY
Refills: 0 | Status: DISCONTINUED | OUTPATIENT
Start: 2022-01-01 | End: 2022-01-01

## 2022-01-01 RX ORDER — FENTANYL CITRATE 50 UG/ML
50 INJECTION INTRAVENOUS ONCE
Refills: 0 | Status: DISCONTINUED | OUTPATIENT
Start: 2022-01-01 | End: 2022-01-01

## 2022-01-01 RX ORDER — CALCIUM GLUCONATE 100 MG/ML
2 VIAL (ML) INTRAVENOUS ONCE
Refills: 0 | Status: DISCONTINUED | OUTPATIENT
Start: 2022-01-01 | End: 2022-01-01

## 2022-01-01 RX ORDER — HYDRALAZINE HCL 50 MG
50 TABLET ORAL THREE TIMES A DAY
Refills: 0 | Status: DISCONTINUED | OUTPATIENT
Start: 2022-01-01 | End: 2022-01-01

## 2022-01-01 RX ORDER — WARFARIN SODIUM 2.5 MG/1
4 TABLET ORAL AT BEDTIME
Refills: 0 | Status: COMPLETED | OUTPATIENT
Start: 2022-01-01 | End: 2022-01-01

## 2022-01-01 RX ORDER — FUROSEMIDE 40 MG
1 TABLET ORAL
Qty: 0 | Refills: 0 | DISCHARGE

## 2022-01-01 RX ORDER — NOREPINEPHRINE BITARTRATE/D5W 8 MG/250ML
0.1 PLASTIC BAG, INJECTION (ML) INTRAVENOUS
Qty: 8 | Refills: 0 | Status: DISCONTINUED | OUTPATIENT
Start: 2022-01-01 | End: 2022-01-01

## 2022-01-01 RX ORDER — HYDRALAZINE HCL 50 MG
0 TABLET ORAL
Qty: 0 | Refills: 0 | DISCHARGE

## 2022-01-01 RX ADMIN — Medication 3 MILLILITER(S): at 15:49

## 2022-01-01 RX ADMIN — Medication 25 MILLIGRAM(S): at 16:59

## 2022-01-01 RX ADMIN — Medication 40 MILLIGRAM(S): at 16:58

## 2022-01-01 RX ADMIN — SPIRONOLACTONE 25 MILLIGRAM(S): 25 TABLET, FILM COATED ORAL at 05:29

## 2022-01-01 RX ADMIN — ENOXAPARIN SODIUM 100 MILLIGRAM(S): 100 INJECTION SUBCUTANEOUS at 09:41

## 2022-01-01 RX ADMIN — SPIRONOLACTONE 25 MILLIGRAM(S): 25 TABLET, FILM COATED ORAL at 05:32

## 2022-01-01 RX ADMIN — Medication 200 MILLIGRAM(S): at 00:27

## 2022-01-01 RX ADMIN — Medication 3 MILLILITER(S): at 16:12

## 2022-01-01 RX ADMIN — FENTANYL CITRATE 27.5 MICROGRAM(S)/KG/HR: 50 INJECTION INTRAVENOUS at 13:20

## 2022-01-01 RX ADMIN — Medication 3 MILLILITER(S): at 04:30

## 2022-01-01 RX ADMIN — Medication 200 MILLIGRAM(S): at 22:01

## 2022-01-01 RX ADMIN — Medication 40 MILLIGRAM(S): at 18:00

## 2022-01-01 RX ADMIN — Medication 50 MILLIGRAM(S): at 21:41

## 2022-01-01 RX ADMIN — Medication 40 MILLIGRAM(S): at 10:40

## 2022-01-01 RX ADMIN — Medication 125 MICROGRAM(S): at 05:32

## 2022-01-01 RX ADMIN — ENOXAPARIN SODIUM 90 MILLIGRAM(S): 100 INJECTION SUBCUTANEOUS at 17:55

## 2022-01-01 RX ADMIN — Medication 50 MILLIGRAM(S): at 05:29

## 2022-01-01 RX ADMIN — Medication 125 MICROGRAM(S): at 06:46

## 2022-01-01 RX ADMIN — Medication 25 MILLIGRAM(S): at 18:18

## 2022-01-01 RX ADMIN — BENZOCAINE AND MENTHOL 1 LOZENGE: 5; 1 LIQUID ORAL at 22:34

## 2022-01-01 RX ADMIN — Medication 40 MILLIGRAM(S): at 17:55

## 2022-01-01 RX ADMIN — VASOPRESSIN 2.4 UNIT(S)/MIN: 20 INJECTION INTRAVENOUS at 09:41

## 2022-01-01 RX ADMIN — ENOXAPARIN SODIUM 90 MILLIGRAM(S): 100 INJECTION SUBCUTANEOUS at 06:45

## 2022-01-01 RX ADMIN — ENOXAPARIN SODIUM 90 MILLIGRAM(S): 100 INJECTION SUBCUTANEOUS at 18:21

## 2022-01-01 RX ADMIN — Medication 50 MILLIGRAM(S): at 21:03

## 2022-01-01 RX ADMIN — LISINOPRIL 20 MILLIGRAM(S): 2.5 TABLET ORAL at 05:26

## 2022-01-01 RX ADMIN — LISINOPRIL 20 MILLIGRAM(S): 2.5 TABLET ORAL at 05:32

## 2022-01-01 RX ADMIN — LISINOPRIL 20 MILLIGRAM(S): 2.5 TABLET ORAL at 05:29

## 2022-01-01 RX ADMIN — ATORVASTATIN CALCIUM 20 MILLIGRAM(S): 80 TABLET, FILM COATED ORAL at 21:36

## 2022-01-01 RX ADMIN — WARFARIN SODIUM 4 MILLIGRAM(S): 2.5 TABLET ORAL at 22:00

## 2022-01-01 RX ADMIN — Medication 25 MILLIGRAM(S): at 05:29

## 2022-01-01 RX ADMIN — Medication 40 MILLIGRAM(S): at 05:29

## 2022-01-01 RX ADMIN — Medication 200 MILLIGRAM(S): at 21:33

## 2022-01-01 RX ADMIN — Medication 50 MILLIGRAM(S): at 21:36

## 2022-01-01 RX ADMIN — SODIUM CHLORIDE 1000 MILLILITER(S): 9 INJECTION INTRAMUSCULAR; INTRAVENOUS; SUBCUTANEOUS at 07:25

## 2022-01-01 RX ADMIN — Medication 3 MILLILITER(S): at 08:50

## 2022-01-01 RX ADMIN — ATORVASTATIN CALCIUM 20 MILLIGRAM(S): 80 TABLET, FILM COATED ORAL at 21:33

## 2022-01-01 RX ADMIN — HYDROMORPHONE HYDROCHLORIDE 0.5 MILLIGRAM(S): 2 INJECTION INTRAMUSCULAR; INTRAVENOUS; SUBCUTANEOUS at 10:43

## 2022-01-01 RX ADMIN — Medication 50 MILLIGRAM(S): at 22:01

## 2022-01-01 RX ADMIN — ATORVASTATIN CALCIUM 20 MILLIGRAM(S): 80 TABLET, FILM COATED ORAL at 22:35

## 2022-01-01 RX ADMIN — Medication 25 MILLIGRAM(S): at 18:21

## 2022-01-01 RX ADMIN — Medication 4.3 MICROGRAM(S)/KG/MIN: at 09:41

## 2022-01-01 RX ADMIN — Medication 50 MILLIGRAM(S): at 21:33

## 2022-01-01 RX ADMIN — Medication 50 MILLIGRAM(S): at 05:25

## 2022-01-01 RX ADMIN — Medication 40 MILLIGRAM(S): at 22:21

## 2022-01-01 RX ADMIN — Medication 50 MILLIGRAM(S): at 13:58

## 2022-01-01 RX ADMIN — ENOXAPARIN SODIUM 90 MILLIGRAM(S): 100 INJECTION SUBCUTANEOUS at 16:59

## 2022-01-01 RX ADMIN — ENOXAPARIN SODIUM 90 MILLIGRAM(S): 100 INJECTION SUBCUTANEOUS at 05:32

## 2022-01-01 RX ADMIN — Medication 650 MILLIGRAM(S): at 01:01

## 2022-01-01 RX ADMIN — Medication 3 MILLILITER(S): at 04:31

## 2022-01-01 RX ADMIN — Medication 3 MILLILITER(S): at 21:01

## 2022-01-01 RX ADMIN — Medication 50 MILLIGRAM(S): at 14:51

## 2022-01-01 RX ADMIN — WARFARIN SODIUM 4 MILLIGRAM(S): 2.5 TABLET ORAL at 21:03

## 2022-01-01 RX ADMIN — Medication 50 MILLIGRAM(S): at 05:32

## 2022-01-01 RX ADMIN — Medication 3 MILLILITER(S): at 21:11

## 2022-01-01 RX ADMIN — Medication 25 MILLIGRAM(S): at 05:25

## 2022-01-01 RX ADMIN — Medication 3 MILLILITER(S): at 21:29

## 2022-01-01 RX ADMIN — Medication 3 MILLILITER(S): at 10:11

## 2022-01-01 RX ADMIN — Medication 25 MILLIGRAM(S): at 18:04

## 2022-01-01 RX ADMIN — Medication 40 MILLIGRAM(S): at 05:32

## 2022-01-01 RX ADMIN — Medication 20 MILLIGRAM(S): at 06:46

## 2022-01-01 RX ADMIN — Medication 40 MILLIGRAM(S): at 05:11

## 2022-01-01 RX ADMIN — Medication 125 MICROGRAM(S): at 05:33

## 2022-01-01 RX ADMIN — Medication 25 MILLIGRAM(S): at 06:45

## 2022-01-01 RX ADMIN — PROTHROMBIN COMPLEX CONCENTRATE (HUMAN) 400 INTERNATIONAL UNIT(S): 25.5; 16.5; 24; 22; 22; 26 POWDER, FOR SOLUTION INTRAVENOUS at 10:45

## 2022-01-01 RX ADMIN — Medication 25 MILLIGRAM(S): at 05:31

## 2022-01-01 RX ADMIN — Medication 25 MILLIGRAM(S): at 05:32

## 2022-01-01 RX ADMIN — LISINOPRIL 20 MILLIGRAM(S): 2.5 TABLET ORAL at 05:11

## 2022-01-01 RX ADMIN — LISINOPRIL 20 MILLIGRAM(S): 2.5 TABLET ORAL at 06:46

## 2022-01-01 RX ADMIN — Medication 25 MILLIGRAM(S): at 17:55

## 2022-01-01 RX ADMIN — Medication 150 MILLIEQUIVALENT(S): at 10:44

## 2022-01-01 RX ADMIN — SPIRONOLACTONE 25 MILLIGRAM(S): 25 TABLET, FILM COATED ORAL at 06:45

## 2022-01-01 RX ADMIN — Medication 25 MILLIGRAM(S): at 05:11

## 2022-01-01 RX ADMIN — Medication 3 MILLILITER(S): at 08:57

## 2022-01-01 RX ADMIN — Medication 200 MILLIGRAM(S): at 15:04

## 2022-01-01 RX ADMIN — Medication 40 MILLIGRAM(S): at 05:28

## 2022-01-01 RX ADMIN — Medication 50 MILLIGRAM(S): at 06:45

## 2022-01-01 RX ADMIN — SPIRONOLACTONE 25 MILLIGRAM(S): 25 TABLET, FILM COATED ORAL at 05:11

## 2022-01-01 RX ADMIN — Medication 125 MICROGRAM(S): at 05:25

## 2022-01-01 RX ADMIN — Medication 25 MILLIGRAM(S): at 16:58

## 2022-01-01 RX ADMIN — ENOXAPARIN SODIUM 90 MILLIGRAM(S): 100 INJECTION SUBCUTANEOUS at 18:22

## 2022-01-01 RX ADMIN — SIMETHICONE 160 MILLIGRAM(S): 80 TABLET, CHEWABLE ORAL at 01:01

## 2022-01-01 RX ADMIN — Medication 200 MILLIGRAM(S): at 23:10

## 2022-01-01 RX ADMIN — Medication 50 MILLIGRAM(S): at 12:18

## 2022-01-01 RX ADMIN — Medication 50 MILLIGRAM(S): at 18:20

## 2022-01-01 RX ADMIN — Medication 125 MICROGRAM(S): at 05:29

## 2022-01-01 RX ADMIN — ATORVASTATIN CALCIUM 20 MILLIGRAM(S): 80 TABLET, FILM COATED ORAL at 21:03

## 2022-01-01 RX ADMIN — ATORVASTATIN CALCIUM 20 MILLIGRAM(S): 80 TABLET, FILM COATED ORAL at 22:00

## 2022-01-01 RX ADMIN — Medication 200 MILLIGRAM(S): at 16:59

## 2022-01-01 RX ADMIN — ATORVASTATIN CALCIUM 20 MILLIGRAM(S): 80 TABLET, FILM COATED ORAL at 21:41

## 2022-01-01 RX ADMIN — MORPHINE SULFATE 2 MILLIGRAM(S): 50 CAPSULE, EXTENDED RELEASE ORAL at 03:09

## 2022-01-01 RX ADMIN — Medication 50 MILLIGRAM(S): at 15:04

## 2022-01-01 RX ADMIN — ENOXAPARIN SODIUM 90 MILLIGRAM(S): 100 INJECTION SUBCUTANEOUS at 05:12

## 2022-01-01 RX ADMIN — Medication 125 MICROGRAM(S): at 05:11

## 2022-01-01 RX ADMIN — WARFARIN SODIUM 4 MILLIGRAM(S): 2.5 TABLET ORAL at 21:41

## 2022-01-01 RX ADMIN — Medication 50 MILLIGRAM(S): at 05:11

## 2022-01-01 RX ADMIN — Medication 50 MILLIGRAM(S): at 13:46

## 2022-01-01 RX ADMIN — Medication 50 MILLIGRAM(S): at 22:35

## 2022-01-01 RX ADMIN — Medication 102 MILLIGRAM(S): at 10:45

## 2022-01-21 NOTE — HISTORY OF PRESENT ILLNESS
[FreeTextEntry1] : 79 y/o Bahamian male with Hx of a thrombosed AAA. He presents with his daughter. 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 It also showed circulation top LE Via an internal mammary to inferior epigastric artery collateral pathway. CFA and proximal SFA and profunda femoral arteries were widely patent in both legs. He has Ax- bifemoral bypass surgery scheduled for 7/12/21. They are here to discuss if surgery can be deferred. He continues to be sedentary, sitting for long periods of time. He states when he walks for 5 minutes and he needs to stop and rest. He denies back or abdominal pain or heaviness. He has compression stockings, but he does not use them because they feel very tight. He ambulates with use of a walker. He refers no leg pain. No fever or chills. He is a non-smoker.

## 2022-01-21 NOTE — PHYSICAL EXAM
[Respiratory Effort] : normal respiratory effort [Normal Rate and Rhythm] : normal rate and rhythm [0] : left 0 [Ankle Swelling (On Exam)] : present [Ankle Swelling On The Left] : moderate [] : bilaterally [Ankle Swelling On The Right] : mild [No HSM] : no hepatosplenomegaly [Alert] : alert [Oriented to Person] : oriented to person [Oriented to Place] : oriented to place [Oriented to Time] : oriented to time [Calm] : calm [JVD] : no jugular venous distention  [Carotid Bruits] : no carotid bruits [Varicose Veins Of Lower Extremities] : not present [Abdomen Masses] : No abdominal masses [Abdomen Tenderness] : ~T ~M No abdominal tenderness [Abdominal Bruit] : no abdominal bruit  [de-identified] : WD, WN, NAD. Awake, alert, interactive. Ambulates without difficulty [de-identified] : AVIS VIVAS [de-identified] : supple [de-identified] : non-labored. [FreeTextEntry1] : Mild to moderate non-pitting edema LLE. There is hyperpigmentation, fibrosis and skin changes.\par WWP. Feet warm bilaterally with signals present. Absent BLE pulses as noted above [de-identified] : soft, nt/nd [de-identified] : no cyanosis or deformity. full ROM, MS 5/5\par  [de-identified] : LLE weakness/paresis c/w his prior stroke

## 2022-01-21 NOTE — ASSESSMENT
[FreeTextEntry1] : 79 y/o male with infrarenal AAA increased in size, increased PVD with claudication and  lymphedema to LLE. CT abdomen and pelvis results discussed and explained. ELA/PVR demonstrates moderate PAD. We discussed that surgery can be deferred for now. He will continue to walk as often as possible and elevate legs at rest.\par \par \par Plan:\par Continue taking Coumadin (A-Fib) and ASA 81 mg QD.\par Maintain medical management of comorbid conditions.\par Continue conservative therapy: compression stockings 20-30 mmHg daily from awakening until bedtime, leg elevation above the level of the heart at rest and frequent ambulation.\par Walk daily for exercise.\par ELA/PVR and CTA Abdomen and pelvis results discussed fully with the patient. All questions and concerns have been discussed to patient satisfaction.\par RTC 3 months.\par \par

## 2022-01-21 NOTE — ADDENDUM
[FreeTextEntry1] : Patient has a possible component of primary lymphedema. The patient has tried months of the basic pump (entre ), compression (20-30mmHg), elevation, and exercise with no improvement and shows signs of hyperpigmentation. The patient reports swelling that extends into the truncal regions which has progressed with the use of the baisc pump. He has also tried adjusting his meals to a low sodium diet and attempting self MLD with no improvement. Since the basic pump does not treat the truncal regions, I am strongly recommending the Flexitouch advanced pneumatic compression pump to treat their full leg and core.\par

## 2022-02-04 PROBLEM — G31.84 MILD COGNITIVE IMPAIRMENT WITH MEMORY LOSS: Status: ACTIVE | Noted: 2022-01-01

## 2022-02-07 NOTE — PHYSICAL EXAM
[Total Score ___ / 30] : the patient achieved a score of [unfilled] /30 [Date / Time ___ / 5] : date / time [unfilled] / 5 [Place ___ / 5] : place [unfilled] / 5 [Registration ___ / 3] : registration [unfilled] / 3 [Serial Sevens ___/5] : serial sevens [unfilled] / 5 [Naming 2 Objects ___ / 2] : naming two objects [unfilled] / 2 [Repeating a Sentence ___ / 1] : repeating a sentence [unfilled] / 1 [Writing a Sentence ___ / 1] : write sentence [unfilled] / 1 [3-stage Verbal Command ___ / 3] : three-stage verbal command [unfilled] / 3 [Written Command ___ / 1] : written command [unfilled] / 1 [Copy a Design ___ / 1] : copy a design [unfilled] / 1 [Recall ___ / 3] : recall [unfilled] / 3 [FreeTextEntry1] : GENERAL PHYSICAL EXAM:\par GEN: no distress, normal affect\par HEENT: NCAT, OP clear\par EYES: sclera white, conjunctiva clear, no nystagmus\par NECK: supple\par CV: normal rhythm\par PULM: no respiratory distress, normal rhythm and effort\par EXT: no edema, no cyanosis\par MSK: muscle tone and strength normal\par SKIN: warm, dry, no rash or lesion on exposed skin \par \par NEUROLOGICAL EXAM:\par Mental Status\par Orientation: alert and oriented to person, place, time, and situation\par Language: clear and fluent, intact comprehension and repetition\par \par Cranial Nerves\par II: visual fields full to confrontation \par III, IV, VI: PERRL, EOMI\par V, VII: facial sensation and movement intact and symmetric \par VIII: hearing intact \par IX, X: uvula midline, soft palate elevates normally \par XI: BL shoulder shrug intact \par XII: tongue midline\par \par Motor\par Shoulder abd: 5 (R), 5 (L)\par EF/EE: 5 (R), 5 (L)\par hand : 5 (R), 5 (L)\par HF/HE: 5 (R), 5 (L)\par KF/KE: 5 (R), 5 (L)\par DF/PF: 5 (R), 5 (L) \par Tone and bulk are normal in upper and lower limbs\par No pronator drift\par \par Sensation\par Intact to light touch in all 4 EXTs\par \par Reflex\par 2+ in BL biceps, brachioradialis, patella\par \par Coordination\par Normal FTN bilaterally\par Able to perform rapid, alternating movements\par \par Gait\par Normal stance, stride, and pivot turn\par Negative Romberg

## 2022-02-07 NOTE — DISCUSSION/SUMMARY
[FreeTextEntry1] : Mr. Rodriguez is an 81 year-old man with PMH pituitary tumor, HTN, a.fib who presented to the office today for evaluation of memory loss. MMSE 24/30, suggesting mild cognitive impairment. Begin donepezil 5mg QHS. Lab work ordered to assess for reversible causes of memory loss. Referral provided for neuropsychological testing. We discussed the importance of managing blood, pressure, cholesterol, eating a healthy diet and daily activity. Follow-up with me in two months or sooner should the need arise. All of their questions and concerns were addressed.

## 2022-02-07 NOTE — HISTORY OF PRESENT ILLNESS
[FreeTextEntry1] : Entire interview conducted with [place]  [place], ID# [id number]. Mr. Rodriguez is an 81 year-old man with PMH pituitary tumor, HTN, a.fib who presents to the office today for evaluation of memory loss. Collateral history provided by his daughter Lico. Lico states that he often forgets where he is and what day it is. He no longer drives or handles his own finances. He denies headache, loss of vision, difficulty speaking, unilateral weakness or impaired sensation, problems with coordination or frequent falls.

## 2022-02-07 NOTE — REVIEW OF SYSTEMS
[Confused or Disoriented] : confusion [Memory Lapses or Loss] : memory loss [Decr. Concentrating Ability] : decreased concentrating ability [Fever] : no fever [Chills] : no chills [Anxiety] : no anxiety [Depression] : no depression [Facial Weakness] : no facial weakness [Arm Weakness] : no arm weakness [Hand Weakness] : no hand weakness [Leg Weakness] : no leg weakness [Numbness] : no numbness [Tingling] : no tingling [Seizures] : no convulsions [Dizziness] : no dizziness [Difficulty Walking] : no difficulty walking [Frequent Falls] : not falling [Eyesight Problems] : no eyesight problems [Loss Of Hearing] : no hearing loss [Chest Pain] : no chest pain [Palpitations] : no palpitations [Shortness Of Breath] : no shortness of breath [Cough] : no cough

## 2022-03-31 NOTE — H&P ADULT - NSHPATTENDINGPLANDISCUSS_GEN_ALL_CORE
AQUILES is an 19-year-old Male with past psychiatric history of prior CPEP (SI in 2019 and psychosis 1/2022); no past inpatient psychiatric hospitalization; on TEENA; BIB parents for psychotic decompensation with disorganized behavior and agitation.  Collateral information obtained from patient's mother (Teagan [phone]) reveling recent consumption of marijuana likely laced with opiates given recent ED urine drug toxicology screening positive for marijuana and opiates.  The differential includes Drug-induced Psychosis given the patient history of recent drug screen results, past history of similar symptoms after drug use, and current psychotic symptoms.  Since admission, the patient has demonstrated continued psychotic symptoms including disorganized thoughts and preoccupations.  Given the patient's continued psychosis, the patient meets criteria for inpatient psychiatric hospitalization.    SH: TEENA from [facility]. Unemployed.  PsyHx: 2x CPEP visits for SI and psychosis. No hospitalization.    Week of 3/28: Patient initially presenting with psychosis, specifically [place] delusion, ideas of reference, disorganized and abstract thought process. This has improving since admission, but some residual delusions.  The patient has little insight into his cannabis use.    1. Legal status  	9.27    2. Significant lab/imaging findings  	Positive urine drug screen [[place]] revealing Marijuana/Opiates, per mother.     3. Psychotropic medication  	Risperidone 2 mg PO at bedtime (for psychosis)  		-Start 3/28    4. Medical conditions, other medications, consults  	None    5. Psychosocial interventions  	Collateral information: Obtained from patient's mother (Teagan [phone]).   pt, Tele, RN

## 2022-04-06 PROBLEM — R14.0 BLOATING: Status: ACTIVE | Noted: 2022-01-01

## 2022-04-06 PROBLEM — R14.3 EXCESSIVE GAS: Status: ACTIVE | Noted: 2022-01-01

## 2022-04-06 PROBLEM — Z13.9 SCREENING DUE: Status: ACTIVE | Noted: 2022-01-01

## 2022-04-06 PROBLEM — R19.7 DIARRHEA: Status: ACTIVE | Noted: 2022-01-01

## 2022-04-06 NOTE — HISTORY OF PRESENT ILLNESS
[de-identified] : The patient arrived for a consultation.  He has history of stroke and has difficulty walking alone.  He can ambulate but with a cane.  As per the daughter who is interpreting, he has been having occasional loose stools twice a week.  No rectal bleeding.  Some abdominal bloating but no nausea or vomiting.  No EGD or colonoscopy in the past.  He has a defibrillator.  Daughter was interested in having procedures.

## 2022-04-06 NOTE — ASSESSMENT
[FreeTextEntry1] : Patient with significant cardiac disease and history of stroke and now with abdominal bloating and occasional loose stool.  I will perform the blood testing and stool testing.  Due to his advanced age and comorbidity, I am not recommending to consider endoscopic evaluation.  Will recommend the use of fiber supplementation and probiotic and follow-up in 2 to 3 months.  Discussed at length with the patient daughter on the phone and the patient wife who was also present and the caretaker.\par \par Jimmy Bahena MD\par Gastroenterology \par \par

## 2022-04-06 NOTE — PHYSICAL EXAM
[General Appearance - Alert] : alert [General Appearance - In No Acute Distress] : in no acute distress [Sclera] : the sclera and conjunctiva were normal [PERRL With Normal Accommodation] : pupils were equal in size, round, and reactive to light [Extraocular Movements] : extraocular movements were intact [Outer Ear] : the ears and nose were normal in appearance [Oropharynx] : the oropharynx was normal [Neck Appearance] : the appearance of the neck was normal [Neck Cervical Mass (___cm)] : no neck mass was observed [Jugular Venous Distention Increased] : there was no jugular-venous distention [Thyroid Diffuse Enlargement] : the thyroid was not enlarged [Thyroid Nodule] : there were no palpable thyroid nodules [Auscultation Breath Sounds / Voice Sounds] : lungs were clear to auscultation bilaterally [Heart Rate And Rhythm] : heart rate was normal and rhythm regular [Heart Sounds] : normal S1 and S2 [Heart Sounds Gallop] : no gallops [Murmurs] : no murmurs [Heart Sounds Pericardial Friction Rub] : no pericardial rub [Bowel Sounds] : normal bowel sounds [Abdomen Soft] : soft [Abdomen Tenderness] : non-tender fall [Abdomen Mass (___ Cm)] : no abdominal mass palpated [FreeTextEntry1] : AICD in place [No CVA Tenderness] : no ~M costovertebral angle tenderness [No Spinal Tenderness] : no spinal tenderness [Abnormal Walk] : normal gait [Nail Clubbing] : no clubbing  or cyanosis of the fingernails [Musculoskeletal - Swelling] : no joint swelling seen [Motor Tone] : muscle strength and tone were normal [Skin Color & Pigmentation] : normal skin color and pigmentation [Skin Turgor] : normal skin turgor [] : no rash [Oriented To Time, Place, And Person] : oriented to person, place, and time [Impaired Insight] : insight and judgment were intact [Affect] : the affect was normal

## 2022-04-14 PROBLEM — K86.81 EXOCRINE PANCREATIC INSUFFICIENCY: Status: ACTIVE | Noted: 2022-01-01

## 2022-04-19 PROBLEM — I71.4 ABDOMINAL AORTIC ANEURYSM (AAA) WITHOUT RUPTURE: Status: ACTIVE | Noted: 2019-06-21

## 2022-04-19 NOTE — HISTORY OF PRESENT ILLNESS
[FreeTextEntry1] : 79 y/o Stateless male with Hx of a thrombosed AAA. He presents with his daughter. 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 It also showed circulation top LE Via an internal mammary to inferior epigastric artery collateral pathway. CFA and proximal SFA and profunda femoral arteries were widely patent in both legs. He has Ax- bifemoral bypass surgery scheduled for 7/12/21. They are here to discuss if surgery can be deferred. He continues to be sedentary, sitting for long periods of time. He states when he walks for 5 minutes and he needs to stop and rest. He denies back or abdominal pain or heaviness. He has compression stockings, but he does not use them because they feel very tight. He ambulates with use of a walker. He refers no leg pain. No fever or chills. He is a non-smoker.

## 2022-04-19 NOTE — PHYSICAL EXAM
[JVD] : no jugular venous distention  [Carotid Bruits] : no carotid bruits [Normal Rate and Rhythm] : normal rate and rhythm [Respiratory Effort] : normal respiratory effort [0] : left 0 [Ankle Swelling (On Exam)] : present [Ankle Swelling On The Left] : of the left ankle [Varicose Veins Of Lower Extremities] : not present [] : bilaterally [Ankle Swelling On The Right] : mild [Abdomen Masses] : No abdominal masses [Abdomen Tenderness] : ~T ~M No abdominal tenderness [Abdominal Bruit] : no abdominal bruit  [No HSM] : no hepatosplenomegaly [Alert] : alert [Oriented to Person] : oriented to person [Oriented to Place] : oriented to place [Oriented to Time] : oriented to time [Calm] : calm [de-identified] : WD, WN, NAD. Awake, alert, interactive. Ambulates without difficulty [de-identified] : AVIS VIVAS [de-identified] : non-labored. [de-identified] : supple [FreeTextEntry1] : Mild to moderate non-pitting edema LLE. There is hyperpigmentation, fibrosis and skin changes.\par WWP. Feet warm bilaterally with signals present. Absent BLE pulses as noted above [de-identified] : soft, nt/nd [de-identified] : no cyanosis or deformity. full ROM, MS 5/5\par  [de-identified] : LLE weakness/paresis c/w his prior stroke

## 2022-05-11 NOTE — ED PROVIDER NOTE - OBJECTIVE STATEMENT
81M h/o CVA with residual L sided deficits, CHF, HTN, MVR on coumadin p/w worsening bilateral LE edema and discoloration x 1 week. HEREDIA x 3-4 days. Also with INR 6.5 yesterday, skipped last night and this morning coumadin dose. Denies fever, CP, cough, nausea, vomiting, injuries, sick contacts.   Cards: Sergo

## 2022-05-11 NOTE — H&P ADULT - PROBLEM SELECTOR PLAN 1
pt. clinically volume over loaded, will keep on iv lasix 40 mg bid, weight daily, monitor I/O. will get echo, cardio consult [place].

## 2022-05-11 NOTE — H&P ADULT - HISTORY OF PRESENT ILLNESS
pt. is an 82 y/o male with h/o CVA with residual L sided deficits, CHF, HTN, MVR on coumadin p/w worsening bilateral LE edema and water retention in his belly x 1 week. HEREDIA x 3-4 days. no orthopnea. no cp. Also with INR 6.5 yesterday, coumadin on hold . Denies fever, cough, abd. pain, nausea, vomiting, or diarrhea. no sick contacts.  pt. is an 82 y/o male with h/o CVA with residual L sided deficits, CHF, HTN, MVR on coumadin p/w worsening bilateral LE edema and water retention in his belly x 1 week. HEREDIA x 3-4 days. no orthopnea. no cp. Also with INR 6.5 yesterday, coumadin on hold . Denies fever, cough, abd. pain, nausea, vomiting, or diarrhea. no sick contacts. no hematemesis, no hemoptysis, no blood per rectum. As per pt. he walks with cane.

## 2022-05-11 NOTE — H&P ADULT - ASSESSMENT
pt. is an 82 y/o male with h/o CVA with residual L sided deficits, CHF, HTN, MVR on coumadin p/w worsening bilateral LE edema and water retention in his belly x 1 week. HEREDIA x 3-4 days. no orthopnea. no cp. Also with INR 6.5 yesterday, coumadin on hold . Denies fever, cough, abd. pain, nausea, vomiting, or diarrhea. no sick contacts. no hematemesis, no hemoptysis, no blood per rectum. As per pt. he walks with cane.

## 2022-05-11 NOTE — ED PROVIDER NOTE - EMPLOYMENT
REFER TO ABOVE:  As per outpatient records, patient had been prescribed a sugar-free vitamin and mineral supplement, so as to reduce risk of micronutrient deficiency, given restrictions of baseline dietary regimen. N/A

## 2022-05-11 NOTE — ED ADULT TRIAGE NOTE - NS ED NURSE BANDS TYPE
Detail Level: Detailed Quality 110: Preventive Care And Screening: Influenza Immunization: Influenza Immunization not Administered because Patient Refused. Name band;

## 2022-05-11 NOTE — H&P ADULT - NSHPPHYSICALEXAM_GEN_ALL_CORE
Vital Signs Last 24 Hrs  T(C): 36.4 (11 May 2022 23:22), Max: 36.4 (11 May 2022 18:37)  T(F): 97.5 (11 May 2022 23:22), Max: 97.6 (11 May 2022 18:37)  HR: 74 (11 May 2022 23:22) (62 - 74)  BP: 153/67 (11 May 2022 23:22) (151/53 - 153/67)  BP(mean): --  RR: 20 (11 May 2022 23:22) (18 - 20)  SpO2: 95% (11 May 2022 23:22) (95% - 99%)    General: obese male In bed not in distress  eyes :  PERRL. intact EOM.  HENT: AT, NC. no throat erythema or exudate. no ear discharge.  Neck: supple. no JVD.   Chest: basal crackles bilaterally.  Heart: S1,S2. RRR. no heart murmur. 2-3 + edema of B/L LE.   Abdomen: soft. non-tender. obese,+ BS.   Ext: no calf tenderness. pulses intact.   Neuro: AAO x3. left side residual deficits from prior stroke, no speech disorder. cns intact.  Skin: chronic venous stasis skin changes over B/L LE noted. no open wound.  lymphatic system : no lymphadenopathy.  psych : mood ok, no si/hi.

## 2022-05-11 NOTE — H&P ADULT - NSICDXPASTSURGICALHX_GEN_ALL_CORE_FT
PAST SURGICAL HISTORY:  Chronic mitral valve disease replaced    H/O inguinal hernia left     PAST SURGICAL HISTORY:  Chronic mitral valve disease replaced    H/O inguinal hernia left    Pacemaker cardiac

## 2022-05-11 NOTE — ED ADULT NURSE NOTE - OBJECTIVE STATEMENT
81 year old male sent to ED by primary care provider, Dr. Verduzco, for evaluation for lower extremity edema.  Alert and oriented x 4.  Turkish speaking, daughter at bedside.  Patient agreeable to have daughter interpret for him. 81 year old male sent to ED by primary care provider, Dr. Verduzco, for evaluation for lower extremity edema.  Alert and oriented x 4.  B[place]ian speaking, daughter at bedside.  Patient agreeable to have daughter interpret for him.  Seen and evaluated by provider, orders obtained and noted.  Patient placed on cardiac monitor.  BLE reddened with weeping edema noted.  +3 pitting edema  Patient also with complaint of HEREDIA x 3-4 days, O2 sat at rest 97% on RA  [place] catheter put in place due to patients urinary incontinence.  Daughter at bedside.  No acute distress noted at this time.

## 2022-05-11 NOTE — ED PROVIDER NOTE - PHYSICAL EXAMINATION
Gen: Well appearing in NAD  Head: NC/AT  Neck: trachea midline  Resp:  bibasilar rales  CV: RRR  GI: soft, NTND, +mild flank edema  Ext: Diffuse bilateral LE edema extending to hips with chronic hyperpigmentation and areas of erythema  Neuro:  A&O appears non focal  Skin:  Warm and dry as visualized  Psych:  Normal affect and mood

## 2022-05-11 NOTE — ED PROVIDER NOTE - CLINICAL SUMMARY MEDICAL DECISION MAKING FREE TEXT BOX
Patient with likely chf exacerbation and elevated INR> Plan for labs, BNP, cxr, lasix prn, tele admission

## 2022-05-12 NOTE — PATIENT PROFILE ADULT - NSPROGENPREVTRANSF_GEN_A_NUR
Plan: Use cerave cream all over 2-3 times daily, use triamcinolone on rash as needed for itching Plan: Try rubbing alcohol as soon as these spots pop up Detail Level: Simple no

## 2022-05-12 NOTE — DISCHARGE NOTE NURSING/CASE MANAGEMENT/SOCIAL WORK - NSDCFUADDAPPT_GEN_ALL_CORE_FT
CARDIOLOGY APPT WITH DR. SMALL ON....  PATIENT AGREES TO RECEIVING HIS MEDICATION AT [facility]/AGREES TO MEDS TO BEDS.. [facility] WILL CONTACT YOU TO SET UP AN APPT.  PATIENT AGREES TO RECEIVING HIS MEDICATION AT [facility]/AGREES TO MEDS TO BEDS..

## 2022-05-12 NOTE — DISCHARGE NOTE NURSING/CASE MANAGEMENT/SOCIAL WORK - NSDCPEFALRISK_GEN_ALL_CORE
For information on Fall & Injury Prevention, visit: [email or web address] OR  [email or web address] OR  https://www.cdc.gov/steadi/patient.html

## 2022-05-12 NOTE — DISCHARGE NOTE NURSING/CASE MANAGEMENT/SOCIAL WORK - NSDCVIVACCINE_GEN_ALL_CORE_FT
influenza, injectable, quadrivalent, preservative free; 05-Oct-2015 12:21; Albina Lutz (RN); [employer]; [id number]; IntraMuscular; Deltoid Right.; 0.5 milliLiter(s); VIS (VIS Published: 07-Aug-2015, VIS Presented: 05-Oct-2015);

## 2022-05-12 NOTE — PATIENT PROFILE ADULT - NSPROPTRIGHTSUPPORTPERSON_GEN_A_NUR
ORTHOPEDIC FOLLOW UP RECOMMENDATIONS: Patient may follow up with her orthopedic surgeon that she sees outpatient (Dr. Lange).  ORTHOPEDIC FOLLOW UP RECOMMENDATIONS: Patient may follow up with her orthopedic surgeon that she sees outpatient (Dr. Lange). Continue antibiotics as prescribed. declines

## 2022-05-12 NOTE — PATIENT PROFILE ADULT - FALL HARM RISK - HARM RISK INTERVENTIONS

## 2022-05-12 NOTE — DISCHARGE NOTE NURSING/CASE MANAGEMENT/SOCIAL WORK - PATIENT PORTAL LINK FT
You can access the FollowMyHealth Patient Portal offered by [facility] by registering at the following website: [email or web address]. By joining [employer]’s FollowMyHealth portal, you will also be able to view your health information using other applications (apps) compatible with our system.

## 2022-05-12 NOTE — CONSULT NOTE ADULT - SUBJECTIVE AND OBJECTIVE BOX
[facility], THE HEART CENTER                                   [address] [place] [address]                                                      PHONE: [phone]                                                         FAX: [phone]  [email or web address]  ---------------------------------------------------------------------------------------------------------------------------------    HPI:  ZANE VEGA is an 81y Male PMHX HTN, HLD, Rheumatic Heart dz, s/p Mechanical Mitral [facility] prior CVA's, chronic af on coumadin, SSS s/p PPM, sleep apnea not complaint with cpap, who presents to [facility] ED with SOB.  Pt admitted with Fluid overload.  Pt has noticed worsening LE edema and sob for the past 1 week.  Pt with elevated INR.      PAST MEDICAL & SURGICAL HISTORY:  HTN (hypertension)      Mitral valve disease      CHF (congestive heart failure)      Respiratory insufficiency      Afib      CVA (cerebral vascular accident)      Chronic mitral valve disease  replaced      H/O inguinal hernia  left      Pacemaker  cardiac          No Known Allergies      MEDICATIONS  (STANDING):  atorvastatin 20 milliGRAM(s) Oral at bedtime  digoxin     Tablet 125 MICROGram(s) Oral daily  furosemide   Injectable 40 milliGRAM(s) IV Push two times a day  hydrALAZINE 50 milliGRAM(s) Oral three times a day  lisinopril 20 milliGRAM(s) Oral daily  metoprolol tartrate 25 milliGRAM(s) Oral two times a day    MEDICATIONS  (PRN):      Family History: Pt denies hx of early cad, SCD, or congenital heart disease.      Social History:  Cigarettes:   former                 Alchohol:   no              Illicit Drug Abuse:  no    ROS:    Extensively Reviewed with pertinents as per HPI the remainder were negative.      Vital Signs Last 24 Hrs  T(C): 36.8 (12 May 2022 07:54), Max: 36.8 (12 May 2022 07:54)  T(F): 98.3 (12 May 2022 07:54), Max: 98.3 (12 May 2022 07:54)  HR: 60 (12 May 2022 07:54) (60 - 74)  BP: 128/56 (12 May 2022 07:54) (128/56 - 153/67)  BP(mean): --  RR: 18 (12 May 2022 07:54) (18 - 20)  SpO2: 96% (12 May 2022 07:54) (95% - 99%)  ICU Vital Signs Last 24 Hrs  ZANE VEGA  I&O's Detail    11 May 2022 07:01  -  12 May 2022 07:00  --------------------------------------------------------  IN:  Total IN: 0 mL    OUT:    Voided (mL): 1500 mL  Total OUT: 1500 mL    Total NET: -1500 mL        I&O's Summary    11 May 2022 07:01  -  12 May 2022 07:00  --------------------------------------------------------  IN: 0 mL / OUT: 1500 mL / NET: -1500 mL      Drug Dosing Weight  ZANE VEGA      PHYSICAL EXAM:  General: Appears well developed, well nourished alert and cooperative.  HEENT: Head; normocephalic, atraumatic.  Eyes: Pupils reactive, cornea wnl.  Neck: Supple, no nodes adenopathy, no NVD or carotid bruit or thyromegaly.  CARDIOVASCULAR: Normal S1 and S2, No murmur, rub, gallop or lift.   LUNGS: No rales, rhonchi or wheeze. Normal breath sounds bilaterally.  ABDOMEN: Soft, nontender without mass or organomegaly. bowel sounds normoactive.  EXTREMITIES: No clubbing, cyanosis or edema. Distal pulses wnl.   SKIN: warm and dry with normal turgor.  NEURO: Alert/oriented x 3/normal motor exam. No pathologic reflexes.    PSYCH: normal affect.        LABS:                        10.1   4.66  )-----------( 158      ( 12 May 2022 03:12 )             32.7     05-12    137  |  102  |  35.4<H>  ----------------------------<  81  4.5   |  21.0<L>  |  1.33<H>    Ca    8.6      12 May 2022 03:12    TPro  7.4  /  Alb  4.0  /  TBili  1.0  /  DBili  x   /  AST  55<H>  /  ALT  25  /  AlkPhos  175<H>  05-11    ZANE SUBIABARREIRO  CARDIAC MARKERS ( 12 May 2022 03:57 )  x     / <0.01 ng/mL / x     / x     / x      CARDIAC MARKERS ( 12 May 2022 03:12 )  x     / 0.01 ng/mL / x     / x     / x      CARDIAC MARKERS ( 11 May 2022 21:01 )  x     / <0.01 ng/mL / x     / x     / x          PT/INR - ( 12 May 2022 03:12 )   PT: 61.0 sec;   INR: 5.17 ratio         PTT - ( 12 May 2022 03:12 )  PTT:59.7 sec      RADIOLOGY & ADDITIONAL STUDIES:    INTERPRETATION OF TELEMETRY (personally reviewed):    ECG: af, v paced     ECHO:  2021  Summary:   1. Left ventricular ejection fraction, by visual estimation, is55 to 60%.   2. Normal global left ventricular systolic function.   3. Severely enlarged right atrium.   4. There is mild septal left ventricular hypertrophy.   5. Moderately enlarged right ventricle.   6. Moderately enlarged left atrium.   7. Moderate mitral annular calcification.   8. Trace mitral valve regurgitation.   9. Bioprosthetic mitral valve with normal function; mean mitral gradient   3 mmHg; acoustic shadowing limiting MR assessment.  10. Mild-moderate tricuspid regurgitation.  11. Moderate aortic regurgitation.  12. Sclerotic aortic valve with normal opening.  13. Endocardial visualization was enhanced with intravenous echo contrast.  14. Mitral valve mean gradient is 3.0 mmHg consistent with mild mitral stenosis.    MD Jorge Luis Electronically signed on 3/24/2021 at 7:26:38 PM      Assessment and Plan:  In summary, ZANE VEGA is an 81y Male PMHX HTN, HLD, Rheumatic Heart dz, s/p Mechanical Mitral valve replacement in 1994 (in [place]), prior CVA's, chronic af on coumadin, SSS s/p PPM, sleep apnea not complaint with cpap, who presents to [facility] ED with SOB.  Pt admitted with Fluid overload.  Pt has noticed worsening LE edema and sob for the past 1 week.  Pt with elevated INR.      Fluid Overload  -presumptive Acute on Chronic HFpEF  -will obtain echo to assess EF  -continue lasix 40 mg iv bid  -continue remainder of meds/dosages  -tele     Thank you for allowing [facility] to participate in the care of this patient.  Please feel free to call with any questions or concerns.                  [facility], THE HEART CENTER                                   [address] [place] [address]                                                      PHONE: [phone]                                                         FAX: [phone]  [email or web address]  ---------------------------------------------------------------------------------------------------------------------------------    HPI:  ZANE VEGA is an 81y Male PMHX HTN, HLD, Rheumatic Heart dz, [facility] prior CVA's, chronic af on coumadin, SSS s/p PPM, sleep apnea not complaint with cpap, who presents to [facility] ED with SOB.  Pt admitted with Fluid overload.  Pt has noticed worsening LE edema and sob for the past 1 week.  Pt with elevated INR.      PAST MEDICAL & SURGICAL HISTORY:  HTN (hypertension)      Mitral valve disease      CHF (congestive heart failure)      Respiratory insufficiency      Afib      CVA (cerebral vascular accident)      Chronic mitral valve disease  replaced      H/O inguinal hernia  left      Pacemaker  cardiac          No Known Allergies      MEDICATIONS  (STANDING):  atorvastatin 20 milliGRAM(s) Oral at bedtime  digoxin     Tablet 125 MICROGram(s) Oral daily  furosemide   Injectable 40 milliGRAM(s) IV Push two times a day  hydrALAZINE 50 milliGRAM(s) Oral three times a day  lisinopril 20 milliGRAM(s) Oral daily  metoprolol tartrate 25 milliGRAM(s) Oral two times a day    MEDICATIONS  (PRN):      Family History: Pt denies hx of early cad, SCD, or congenital heart disease.      Social History:  Cigarettes:   former                 Alchohol:   no              Illicit Drug Abuse:  no    ROS:    Extensively Reviewed with pertinents as per HPI the remainder were negative.      Vital Signs Last 24 Hrs  T(C): 36.8 (12 May 2022 07:54), Max: 36.8 (12 May 2022 07:54)  T(F): 98.3 (12 May 2022 07:54), Max: 98.3 (12 May 2022 07:54)  HR: 60 (12 May 2022 07:54) (60 - 74)  BP: 128/56 (12 May 2022 07:54) (128/56 - 153/67)  BP(mean): --  RR: 18 (12 May 2022 07:54) (18 - 20)  SpO2: 96% (12 May 2022 07:54) (95% - 99%)  ICU Vital Signs Last 24 Hrs  ZANE VEGA  I&O's Detail    11 May 2022 07:01  -  12 May 2022 07:00  --------------------------------------------------------  IN:  Total IN: 0 mL    OUT:    Voided (mL): 1500 mL  Total OUT: 1500 mL    Total NET: -1500 mL        I&O's Summary    11 May 2022 07:01  -  12 May 2022 07:00  --------------------------------------------------------  IN: 0 mL / OUT: 1500 mL / NET: -1500 mL      Drug Dosing Weight  ZANE VEGA      PHYSICAL EXAM:  General: Appears well developed, well nourished alert and cooperative.  HEENT: Head; normocephalic, atraumatic.  Eyes: Pupils reactive, cornea wnl.  Neck: Supple, no nodes adenopathy, no NVD or carotid bruit or thyromegaly.  CARDIOVASCULAR: Normal S1 and S2, No murmur, rub, gallop or lift.   LUNGS: No rales, rhonchi or wheeze. Normal breath sounds bilaterally.  ABDOMEN: Soft, nontender without mass or organomegaly. bowel sounds normoactive.  EXTREMITIES: No clubbing, cyanosis or edema. Distal pulses wnl.   SKIN: warm and dry with normal turgor.  NEURO: Alert/oriented x 3/normal motor exam. No pathologic reflexes.    PSYCH: normal affect.        LABS:                        10.1   4.66  )-----------( 158      ( 12 May 2022 03:12 )             32.7     05-12    137  |  102  |  35.4<H>  ----------------------------<  81  4.5   |  21.0<L>  |  1.33<H>    Ca    8.6      12 May 2022 03:12    TPro  7.4  /  Alb  4.0  /  TBili  1.0  /  DBili  x   /  AST  55<H>  /  ALT  25  /  AlkPhos  175<H>  05-11    ZANE SUBIABARREIRO  CARDIAC MARKERS ( 12 May 2022 03:57 )  x     / <0.01 ng/mL / x     / x     / x      CARDIAC MARKERS ( 12 May 2022 03:12 )  x     / 0.01 ng/mL / x     / x     / x      CARDIAC MARKERS ( 11 May 2022 21:01 )  x     / <0.01 ng/mL / x     / x     / x          PT/INR - ( 12 May 2022 03:12 )   PT: 61.0 sec;   INR: 5.17 ratio         PTT - ( 12 May 2022 03:12 )  PTT:59.7 sec      RADIOLOGY & ADDITIONAL STUDIES:    INTERPRETATION OF TELEMETRY (personally reviewed):    ECG: af, v paced     ECHO:  2021  Summary:   1. Left ventricular ejection fraction, by visual estimation, is55 to 60%.   2. Normal global left ventricular systolic function.   3. Severely enlarged right atrium.   4. There is mild septal left ventricular hypertrophy.   5. Moderately enlarged right ventricle.   6. Moderately enlarged left atrium.   7. Moderate mitral annular calcification.   8. Trace mitral valve regurgitation.   9. Bioprosthetic mitral valve with normal function; mean mitral gradient   3 mmHg; acoustic shadowing limiting MR assessment.  10. Mild-moderate tricuspid regurgitation.  11. Moderate aortic regurgitation.  12. Sclerotic aortic valve with normal opening.  13. Endocardial visualization was enhanced with intravenous echo contrast.  14. Mitral valve mean gradient is 3.0 mmHg consistent with mild mitral stenosis.    MD Jorge Luis Electronically signed on 3/24/2021 at 7:26:38 PM      Assessment and Plan:  In summary, ZANE VEGA is an 81y Male PMHX HTN, HLD, Rheumatic Heart dz, s/p Mechanical Mitral valve replacement in 1994 (in [place]), prior CVA's, chronic af on coumadin, SSS s/p PPM, sleep apnea not complaint with cpap, who presents to [facility] ED with SOB.  Pt admitted with Fluid overload.  Pt has noticed worsening LE edema and sob for the past 1 week.  Pt with elevated INR.      Fluid Overload  -presumptive Acute on Chronic HFpEF  -will obtain echo to assess EF  -continue lasix 40 mg iv bid  -continue remainder of meds/dosages  -tele   -nutrition evaluation    Thank you for allowing [facility] to participate in the care of this patient.  Please feel free to call with any questions or concerns.

## 2022-05-16 NOTE — ED PROVIDER NOTE - NS_EDPROVIDERDISPOUSERTYPE_ED_A_ED
Discharge Summary - Gynecologic Oncology  Yue Nelson 33 y o  female MRN: [id number]  [facility] En[facility]: [id number]    Admission Date: 5/6/2022   Discharge Date: 5/8/2022    Attending Physician: Dr Josesito Oshea Physician(s):   Acute Pain Service    Admitting Diagnosis:   Squamous cell carcinoma of the cervix   Obesity     Discharge Diagnosis:   Squamous cell carcinoma of the cervix   Obesity     Procedures Performed:   Exploratory laparotomy, pelvic lymphadenectomy, bilateral salpingectomy, and bilateral oophoropexy     Hospital Course:   Yue Nelson is a 33 y o  [de-identified] female who presented for scheduled procedure on 5/6/2022  Procedure was notable for finding of metastatic lymph node on frozen section intra-operatively  Based on this finding, the uterus was left in situ with plan for future chemoradiation  Her procedure was otherwise uncomplicated  She was admitted on the day of surgery for acute postoperative management  She had an epidural catheter placed pre-operatively for pain management, which was removed on POD 1  Her pain was then controlled with a PO pain regimen  Her paul catheter was removed on POD 1, after which she was voiding without difficulty  She was tolerating a PO/regular diet and was passing flatus  She was ambulating without difficulty  She was discharged on POD 2 in stable condition  She was given prescriptions for pain control  She was asked to follow up with Dr Shelia Erickson in 2 weeks for a postoperative appointment       Lab Results:   Lab Results   Component Value Date    WBC 6 73 05/08/2022    HGB 10 2 (L) 05/08/2022    HCT 31 1 (L) 05/08/2022    MCV 89 05/08/2022     05/08/2022     Lab Results   Component Value Date    CALCIUM 8 5 05/08/2022    K 3 8 05/08/2022    CO2 29 05/08/2022     (H) 05/08/2022    BUN 6 05/08/2022    CREATININE 0 58 (L) 05/08/2022     No results found for: POCGLU  No results found for: PTT  No results found for: INR, PROTIME    Pathology: pending    Imaging: n/a    Condition at Discharge: good     Discharge Medications: See after visit summary for reconciled discharge medications provided to patient and family  Discharge instructions/Information to patient and family: See after visit summary for information provided to patient and family  Provisions for Follow-Up Care: See after visit summary for information related to follow-up care and any pertinent home health orders  Disposition: Home    Planned Readmission: Yes, given nature of condition     Code Status: Full code     Discharge Statement 30 minutes were spent discharging the patient  Dr Becky Monge discharged the patient  I personally did not discharge the patient  Attending Attestation (For Attendings USE Only)...

## 2022-05-17 NOTE — PHYSICAL THERAPY INITIAL EVALUATION ADULT - GENERAL OBSERVATIONS, REHAB EVAL
Pt received on [facility], pt ok for PT by MÓNICA Ferrell. pt is primarily Maldivian speaking, treating PT spoke Maldivian. pt observed semi-morley in bed on room air, IV lock, primafit, pleasant, cooperative, A&O and c/o 0/10 pain t/o eval.

## 2022-05-17 NOTE — CONSULT NOTE ADULT - SUBJECTIVE AND OBJECTIVE BOX
[facility] DIVISION OF KIDNEY DISEASES AND HYPERTENSION -- INITIAL CONSULT NOTE  --------------------------------------------------------------------------------  HPI:  '' 80 y/o male pt with h/o CVA with residual L sided deficits, CHF, HTN, MVR on coumadin p/w worsening bilateral LE edema and water retention in his belly x 1 week. HEREDIA x 3-4 days. no orthopnea. no cp. Also with INR 6.5 yesterday, coumadin on hold . Denies fever, cough, abd. pain, nausea, vomiting, or diarrhea. no sick contacts. no hematemesis, no hemoptysis, no blood per rectum. As per pt. he walks with cane. ''    above appreciated- nephrology consulted for CKD.  Pt seen and examined; interviewed via [employer]  (ID [id number])  Pt reports edema has improved, dyspnea resolved.  Denies any difficulty urination.     PAST HISTORY  --------------------------------------------------------------------------------  PAST MEDICAL & SURGICAL HISTORY:  HTN (hypertension)  Mitral valve disease  CHF (congestive heart failure)  Respiratory insufficiency  Afib  CVA (cerebral vascular accident)  Chronic mitral valve disease  replaced  H/O inguinal hernia  left  Pacemaker  cardiac    FAMILY HISTORY:  FH: heart disease (Mother)      PAST SOCIAL HISTORY:     ALLERGIES & MEDICATIONS  --------------------------------------------------------------------------------  Allergies    No Known Allergies    Intolerances      Standing Inpatient Medications  albuterol/ipratropium for Nebulization 3 milliLiter(s) Nebulizer every 6 hours  atorvastatin 20 milliGRAM(s) Oral at bedtime  digoxin     Tablet 125 MICROGram(s) Oral daily  enoxaparin Injectable 90 milliGRAM(s) SubCutaneous every 12 hours  furosemide    Tablet 20 milliGRAM(s) Oral daily  hydrALAZINE 50 milliGRAM(s) Oral three times a day  lisinopril 20 milliGRAM(s) Oral daily  metoprolol tartrate 25 milliGRAM(s) Oral two times a day  spironolactone 25 milliGRAM(s) Oral daily    PRN Inpatient Medications  guaiFENesin Oral Liquid (Sugar-Free) 200 milliGRAM(s) Oral every 6 hours PRN  hydrocodone/homatropine Syrup 5 milliLiter(s) Oral every 6 hours PRN      REVIEW OF SYSTEMS  --------------------------------------------------------------------------------  Gen: No weight changes, fatigue, fevers/chills, weakness  Skin: No rashes  Head/Eyes/Ears/Mouth: No headache; Normal hearing; Normal vision w/o blurriness; No sinus pain/discomfort, sore throat  Respiratory: No dyspnea, cough, wheezing, hemoptysis  CV: No chest pain, PND, orthopnea  GI: No abdominal pain, diarrhea, constipation, nausea, vomiting, melena, hematochezia  : No increased frequency, dysuria, hematuria, nocturia  MSK: No joint pain/swelling; no back pain; no edema  Neuro: No dizziness/lightheadedness, weakness, seizures, numbness, tingling  Heme: No easy bruising or bleeding  Endo: No heat/cold intolerance  Psych: No significant nervousness, anxiety, stress, depression    All other systems were reviewed and are negative, except as noted.    VITALS/PHYSICAL EXAM  --------------------------------------------------------------------------------  T(C): 36.7 (05-17-22 @ 04:11), Max: 37 (05-16-22 @ 21:30)  HR: 84 (05-17-22 @ 08:52) (67 - 84)  BP: 131/56 (05-17-22 @ 04:11) (112/55 - 165/75)  RR: 18 (05-17-22 @ 04:11) (18 - 18)  SpO2: 95% (05-17-22 @ 04:30) (94% - 99%)  Wt(kg): --        05-16-22 @ 07:01  -  05-17-22 @ 07:00  --------------------------------------------------------  IN: 180 mL / OUT: 850 mL / NET: -670 mL      Physical Exam:  	Gen: NAD  	HEENT: supple neck  	Pulm: CTA B/L  	CV: RRR, S1S2; no rub  	Back: no sacral edema  	Abd: +BS, soft, nontender/nondistended  	: No suprapubic tenderness  	UE: Warm,  edema  	LE: Warm,  no edema  	Neuro: No focal deficit  	Psych: Normal affect and mood  	Skin: stasis dermatitis    LABS/STUDIES  --------------------------------------------------------------------------------              10.5   4.21  >-----------<  147      [05-17-22 @ 07:18]              33.4     140  |  98  |  48.9  ----------------------------<  88      [05-17-22 @ 07:18]  3.8   |  29.0  |  1.75        Ca     8.3     [05-17-22 @ 07:18]      Mg     2.1     [05-17-22 @ 07:18]      Phos  3.2     [05-17-22 @ 07:18]      PT/INR: PT 28.5 , INR 2.43       [05-17-22 @ 07:18]      Creatinine Trend:  SCr 1.75 [05-17 @ 07:18]  SCr 1.65 [05-16 @ 07:50]  SCr 1.37 [05-15 @ 08:49]  SCr 1.52 [05-13 @ 08:35]  SCr 1.33 [05-12 @ 03:12]    Urinalysis - [10-30-20 @ 21:28]      Color Yellow / Appearance Clear / SG 1.010 / pH 7.0      Gluc Negative / Ketone Negative  / Bili Negative / Urobili Negative       Blood Large / Protein 100 / Leuk Est Negative / Nitrite Negative      RBC 25-50 / WBC 0-2 / Hyaline  / Gran  / Sq Epi  / Non Sq Epi  / Bacteria       Lipid: chol 97, TG 47, HDL 67, LDL --      [05-12-22 @ 03:12]

## 2022-05-17 NOTE — DIETITIAN INITIAL EVALUATION ADULT - NS FNS DIET ORDER
Diet, DASH/TLC:   Sodium & Cholesterol Restricted  1000mL Fluid Restriction ([id number]) (05-12-22 @ 05:09)

## 2022-05-17 NOTE — PHYSICAL THERAPY INITIAL EVALUATION ADULT - ADDITIONAL COMMENTS
Pt lives with spouse (in a private home with 5 DWAYNE 2 handrails (can't use both at the same time) and bed&bath on ground level. Pt's PLOF was independent in all ADLs/ambulation without an Assistive Device and (+) driving PTA. Pt has RW, SAC and shower chair DME at home. Pt lives with spouse(she has limited ability to assist. pt states that he has a aide 7 days a week from 1pm-5pm) in a private home with 4 DWAYNE 1 handrail and bed&bath on ground level. Pt's PLOF was required assistance for all ADLs and independent with ambulation with SAC (right hand) and (-) driving PTA. Pt has SAC DME at home.

## 2022-05-17 NOTE — CHART NOTE - NSCHARTNOTEFT_GEN_A_CORE
RN called due to pt complaining of abd pain x 2 days. Pt reports intermittent LUQ pain w/ bloating. Pain is worsened w/ palpation and alleviated w/ defecation. He states that he has been passing gas and having formed BM's w/o blood. He denies n/v, CP, SOB, lightheadedness, pain out of proportion, and/or dizziness.        ROS: No chest pain, palpitations, SOB, light headedness, dizziness, headache, nausea/vomiting, fevers/chills, abdominal pain, dysuria or increased urinary frequency.      Vital Signs Last 24 Hrs  T(C): 36.9 (17 May 2022 21:27), Max: 36.9 (17 May 2022 15:50)  T(F): 98.4 (17 May 2022 21:27), Max: 98.4 (17 May 2022 15:50)  HR: 62 (17 May 2022 21:27) (62 - 84)  BP: 152/65 (17 May 2022 21:27) (125/62 - 152/65)  BP(mean): --  RR: 18 (17 May 2022 21:27) (18 - 18)  SpO2: 96% (17 May 2022 21:27) (94% - 96%)      GENERAL: NAD, lying comfortably  HEAD: Atraumatic, Normocephalic  EYES: EOMI, PERRLA, conjunctiva and sclera clear  ENMT: Moist mucous membranes  NERVOUS SYSTEM: Alert and awake, Good concentration  CHEST/LUNG: Clear to auscultation b/l; Unlabored respirations  HEART: S1S2+, Regular rate and rhythm  ABDOMEN: Soft, Nontender, Nondistended; BS+   EXTREMITIES:  2+ Peripheral Pulses, No LE edema, no tenderness to palpation of b/l LE  SKIN: Warm and dry RN called due to pt complaining of abd pain x 2 days. Pt reports intermittent LUQ pain w/ bloating. Pain is worsened w/ palpation and alleviated w/ defecation. He states that he has been passing gas and having formed BM's w/o blood. He denies n/v, CP, SOB, lightheadedness, pain out of proportion, and/or dizziness.  Of note, pt was admitted w/ abdominal fluid retention x 1 week prior to admission.    ROS: No chest pain, palpitations, SOB, light headedness, dizziness, headache, nausea/vomiting, fevers/chills, dysuria or increased urinary frequency.    Vital Signs Last 24 Hrs  T(C): 36.9 (17 May 2022 21:27), Max: 36.9 (17 May 2022 15:50)  T(F): 98.4 (17 May 2022 21:27), Max: 98.4 (17 May 2022 15:50)  HR: 62 (17 May 2022 21:27) (62 - 84)  BP: 152/65 (17 May 2022 21:27) (125/62 - 152/65)  BP(mean): --  RR: 18 (17 May 2022 21:27) (18 - 18)  SpO2: 96% (17 May 2022 21:27) (94% - 96%)    GENERAL: NAD, lying comfortably  HEAD: Atraumatic, Normocephalic  NERVOUS SYSTEM: Alert and awake, Good concentration  CHEST/LUNG: Clear to auscultation b/l; Unlabored respirations  HEART: Regular rate and rhythm  ABDOMEN: Non-rigid, generalized distention w/ noticeable LUQ distention, normoactive bowel sounds present in all quadrants, TTP w/ guarding, referred LUQ pain  SKIN: Warm and dry    - Urgent KUB w/ normal bowel gas patterns. Official read pending.  - Urgent CT abd/pelvis  - CBC  - Tylenol 650mg  - Simethicone 160mg   - VSS  - RN to notify for any changes RN called due to pt complaining of abd pain x 2 days. Pt reports intermittent LUQ pain w/ bloating. Pain is worsened w/ palpation and alleviated w/ defecation. He states that he has been passing gas and having formed BM's w/o blood. He denies n/v, CP, SOB, lightheadedness, pain out of proportion, and/or dizziness.  Of note, pt was admitted w/ abdominal fluid retention x 1 week prior to admission.    ROS: No chest pain, palpitations, SOB, lightheadedness, dizziness, headache, nausea/vomiting, fevers/chills, dysuria or increased urinary frequency.    Vital Signs Last 24 Hrs  T(C): 36.9 (17 May 2022 21:27), Max: 36.9 (17 May 2022 15:50)  T(F): 98.4 (17 May 2022 21:27), Max: 98.4 (17 May 2022 15:50)  HR: 62 (17 May 2022 21:27) (62 - 84)  BP: 152/65 (17 May 2022 21:27) (125/62 - 152/65)  BP(mean): --  RR: 18 (17 May 2022 21:27) (18 - 18)  SpO2: 96% (17 May 2022 21:27) (94% - 96%)    GENERAL: NAD, lying comfortably  HEAD: Atraumatic, Normocephalic  NERVOUS SYSTEM: Alert and awake, Good concentration  CHEST/LUNG: Clear to auscultation b/l; Unlabored respirations  HEART: Regular rate and rhythm  ABDOMEN: Non-rigid, generalized distention w/ noticeable LUQ distention, normoactive bowel sounds present in all quadrants, TTP w/ guarding, referred LUQ pain  SKIN: Warm and dry    - Urgent KUB w/ normal bowel gas patterns. Official read pending.  - Urgent CT abd/pelvis  - CBC  - Tylenol 650mg  - Simethicone 160mg   - VSS  - RN to notify for any changes    Addendum [id number]  Spoke to [facility] for preliminary read of CT abd/pelvis. Preliminary findings of a rectus sheath hematoma in the LUQ. Official read pending. Pt seen at bedside in NAD. He endorses similar pain to before.     ROS: No chest pain, palpitations, SOB, lightheadedness, dizziness, headache, nausea/vomiting, fevers/chills, dysuria or increased urinary frequency.    Vital Signs Last 24 Hrs  T(C): 36.9 (17 May 2022 21:27), Max: 36.9 (17 May 2022 15:50)  T(F): 98.4 (17 May 2022 21:27), Max: 98.4 (17 May 2022 15:50)  HR: 77 (18 May 2022 03:08) (62 - 84)  BP: 181/66 (18 May 2022 03:08) (125/62 - 181/66)  BP(mean): --  RR: 18 (18 May 2022 03:08) (18 - 18)  SpO2: 96% (17 May 2022 21:27) (96% - 96%)    ABDOMEN: Non-rigid, significant protruding LUQ mass, approximately 2x size of previous exam TTP, +guarding RN called due to pt complaining of abd pain x 2 days. Pt reports intermittent LUQ pain w/ bloating. Pain is worsened w/ palpation and alleviated w/ defecation. He states that he has been passing gas and having formed BM's w/o blood. He denies n/v, CP, SOB, lightheadedness, pain out of proportion, and/or dizziness.  Of note, pt was admitted w/ abdominal fluid retention x 1 week prior to admission.    ROS: No chest pain, palpitations, SOB, lightheadedness, dizziness, headache, nausea/vomiting, fevers/chills, dysuria or increased urinary frequency.    Vital Signs Last 24 Hrs  T(C): 36.9 (17 May 2022 21:27), Max: 36.9 (17 May 2022 15:50)  T(F): 98.4 (17 May 2022 21:27), Max: 98.4 (17 May 2022 15:50)  HR: 62 (17 May 2022 21:27) (62 - 84)  BP: 152/65 (17 May 2022 21:27) (125/62 - 152/65)  BP(mean): --  RR: 18 (17 May 2022 21:27) (18 - 18)  SpO2: 96% (17 May 2022 21:27) (94% - 96%)    GENERAL: NAD, lying comfortably  HEAD: Atraumatic, Normocephalic  NERVOUS SYSTEM: Alert and awake, Good concentration  CHEST/LUNG: Clear to auscultation b/l; Unlabored respirations  HEART: Regular rate and rhythm  ABDOMEN: Non-rigid, generalized distention w/ noticeable LUQ distention, normoactive bowel sounds present in all quadrants, TTP w/ guarding, referred LUQ pain  SKIN: Warm and dry    - Urgent KUB w/ normal bowel gas patterns. Official read pending.  - Urgent CT abd/pelvis  - CBC  - Tylenol 650mg  - Simethicone 160mg   - VSS  - RN to notify for any changes    Addendum [id number]  Spoke to [facility] for preliminary read of CT abd/pelvis. Preliminary findings of a rectus sheath hematoma in the LUQ. Official read pending. Pt seen at bedside in NAD. He endorses similar pain to before.     ROS: No chest pain, palpitations, SOB, lightheadedness, dizziness, headache, nausea/vomiting, fevers/chills, dysuria or increased urinary frequency.    Vital Signs Last 24 Hrs  T(C): 36.9 (17 May 2022 21:27), Max: 36.9 (17 May 2022 15:50)  T(F): 98.4 (17 May 2022 21:27), Max: 98.4 (17 May 2022 15:50)  HR: 77 (18 May 2022 03:08) (62 - 84)  BP: 181/66 (18 May 2022 03:08) (125/62 - 181/66)  BP(mean): --  RR: 18 (18 May 2022 03:08) (18 - 18)  SpO2: 96% (17 May 2022 21:27) (96% - 96%)    ABDOMEN: Non-rigid, significant protruding LUQ mass, approximately 2x size of previous exam TTP, +guarding    - Lovenox held in the setting of an acute bleed. INR 2.43. Pt   - NGT placed for decompression  - NPO  - Surgery consulted and at bedside. Recommend CT abd/pelvis w/ IV contrast. G RN called due to pt complaining of abd pain x 2 days. Pt reports intermittent LUQ pain w/ bloating. Pain is worsened w/ palpation and alleviated w/ defecation. He states that he has been passing gas and having formed BM's w/o blood. He denies n/v, CP, SOB, lightheadedness, pain out of proportion, and/or dizziness.  Of note, pt was admitted w/ abdominal fluid retention x 1 week prior to admission.    ROS: No chest pain, palpitations, SOB, lightheadedness, dizziness, headache, nausea/vomiting, fevers/chills, dysuria or increased urinary frequency.    Vital Signs Last 24 Hrs  T(C): 36.9 (17 May 2022 21:27), Max: 36.9 (17 May 2022 15:50)  T(F): 98.4 (17 May 2022 21:27), Max: 98.4 (17 May 2022 15:50)  HR: 62 (17 May 2022 21:27) (62 - 84)  BP: 152/65 (17 May 2022 21:27) (125/62 - 152/65)  BP(mean): --  RR: 18 (17 May 2022 21:27) (18 - 18)  SpO2: 96% (17 May 2022 21:27) (94% - 96%)    GENERAL: NAD, lying comfortably  HEAD: Atraumatic, Normocephalic  NERVOUS SYSTEM: Alert and awake, Good concentration  CHEST/LUNG: Clear to auscultation b/l; Unlabored respirations  HEART: Regular rate and rhythm  ABDOMEN: Non-rigid, generalized distention w/ noticeable LUQ distention, normoactive bowel sounds present in all quadrants, TTP w/ guarding, referred LUQ pain  SKIN: Warm and dry    - Urgent KUB w/ normal bowel gas patterns. Official read pending.  - Urgent CT abd/pelvis  - CBC  - Tylenol 650mg  - Simethicone 160mg   - VSS  - RN to notify for any changes    Addendum [id number]  Spoke to [facility] for preliminary read of CT abd/pelvis. Preliminary findings of a rectus sheath hematoma in the LUQ. Official read pending. Pt seen at bedside in NAD. He endorses similar pain to before.     ROS: No chest pain, palpitations, SOB, lightheadedness, dizziness, headache, nausea/vomiting, fevers/chills, dysuria or increased urinary frequency.    Vital Signs Last 24 Hrs  T(C): 36.9 (17 May 2022 21:27), Max: 36.9 (17 May 2022 15:50)  T(F): 98.4 (17 May 2022 21:27), Max: 98.4 (17 May 2022 15:50)  HR: 77 (18 May 2022 03:08) (62 - 84)  BP: 181/66 (18 May 2022 03:08) (125/62 - 181/66)  BP(mean): --  RR: 18 (18 May 2022 03:08) (18 - 18)  SpO2: 96% (17 May 2022 21:27) (96% - 96%)    ABDOMEN: Non-rigid, significant protruding LUQ mass, approximately 2x size of previous exam TTP, +guarding    - Lovenox held in the setting of an acute bleed. Pt's INR is 2.43 and has a [id number] of 7. He is currently hemodynamically stable. Risks of reversing AC outweigh the benefits due to     - NGT placed for decompression  - NPO  - Surgery consulted and at bedside. Recommend CT abd/pelvis w/ IV contrast. G RN called due to pt complaining of abd pain x 2 days. Pt reports intermittent LUQ pain w/ bloating. Pain is worsened w/ palpation and alleviated w/ defecation. He states that he has been passing gas and having formed BM's w/o blood. He denies n/v, CP, SOB, lightheadedness, pain out of proportion, and/or dizziness.  Of note, pt was admitted w/ abdominal fluid retention x 1 week prior to admission.    ROS: No chest pain, palpitations, SOB, lightheadedness, dizziness, headache, nausea/vomiting, fevers/chills, dysuria or increased urinary frequency.    Vital Signs Last 24 Hrs  T(C): 36.9 (17 May 2022 21:27), Max: 36.9 (17 May 2022 15:50)  T(F): 98.4 (17 May 2022 21:27), Max: 98.4 (17 May 2022 15:50)  HR: 62 (17 May 2022 21:27) (62 - 84)  BP: 152/65 (17 May 2022 21:27) (125/62 - 152/65)  BP(mean): --  RR: 18 (17 May 2022 21:27) (18 - 18)  SpO2: 96% (17 May 2022 21:27) (94% - 96%)    GENERAL: NAD, lying comfortably  HEAD: Atraumatic, Normocephalic  NERVOUS SYSTEM: Alert and awake, Good concentration  CHEST/LUNG: Clear to auscultation b/l; Unlabored respirations  HEART: Regular rate and rhythm  ABDOMEN: Non-rigid, generalized distention w/ noticeable LUQ distention, normoactive bowel sounds present in all quadrants, TTP w/ guarding, referred LUQ pain  SKIN: Warm and dry    - Urgent KUB w/ normal bowel gas patterns. Official read pending.  - Urgent CT abd/pelvis  - CBC  - Tylenol 650mg  - Simethicone 160mg   - VSS  - RN to notify for any changes    Addendum [id number]  Spoke to [facility] for preliminary read of CT abd/pelvis. Preliminary findings of a rectus sheath hematoma in the LUQ. Official read pending. Pt seen at bedside in NAD. He endorses similar pain to before.     ROS: No chest pain, palpitations, SOB, lightheadedness, dizziness, headache, nausea/vomiting, fevers/chills, dysuria or increased urinary frequency.    Vital Signs Last 24 Hrs  T(C): 36.9 (17 May 2022 21:27), Max: 36.9 (17 May 2022 15:50)  T(F): 98.4 (17 May 2022 21:27), Max: 98.4 (17 May 2022 15:50)  HR: 77 (18 May 2022 03:08) (62 - 84)  BP: 181/66 (18 May 2022 03:08) (125/62 - 181/66)  BP(mean): --  RR: 18 (18 May 2022 03:08) (18 - 18)  SpO2: 96% (17 May 2022 21:27) (96% - 96%)    ABDOMEN: Non-rigid, significant protruding LUQ mass, approximately 2x size of previous exam TTP, +guarding    NGT placed for decompression  - NPO until further recommendations    Surgery consulted  - Pt seen at bedside   - See note    AC held in the setting of an acute bleed. He is currently hemodynamically stable. Risks of reversing AC outweigh the benefits.  - INR 2.43   - [id number] of 7  - CBC q4hrs  - Reverse AC PRN for downtrending h&h and/or hypotension  - Modified RN called due to pt complaining of abd pain x 2 days. Pt reports intermittent LUQ pain w/ bloating. Pain is worsened w/ palpation and alleviated w/ defecation. He states that he has been passing gas and having formed BM's w/o blood. He denies n/v, CP, SOB, lightheadedness, pain out of proportion, and/or dizziness.  Of note, pt was admitted w/ abdominal fluid retention x 1 week prior to admission.    ROS: No chest pain, palpitations, SOB, lightheadedness, dizziness, headache, nausea/vomiting, fevers/chills, dysuria or increased urinary frequency.    Vital Signs Last 24 Hrs  T(C): 36.9 (17 May 2022 21:27), Max: 36.9 (17 May 2022 15:50)  T(F): 98.4 (17 May 2022 21:27), Max: 98.4 (17 May 2022 15:50)  HR: 62 (17 May 2022 21:27) (62 - 84)  BP: 152/65 (17 May 2022 21:27) (125/62 - 152/65)  BP(mean): --  RR: 18 (17 May 2022 21:27) (18 - 18)  SpO2: 96% (17 May 2022 21:27) (94% - 96%)    GENERAL: NAD, lying comfortably  HEAD: Atraumatic, Normocephalic  NERVOUS SYSTEM: Alert and awake, Good concentration  CHEST/LUNG: Clear to auscultation b/l; Unlabored respirations  HEART: Regular rate and rhythm  ABDOMEN: Non-rigid, generalized distention w/ noticeable LUQ distention, normoactive bowel sounds present in all quadrants, TTP w/ guarding, referred LUQ pain  SKIN: Warm and dry    - Urgent KUB w/ normal bowel gas patterns. Official read pending.  - Urgent CT abd/pelvis  - CBC  - Tylenol 650mg  - Simethicone 160mg   - VSS  - RN to notify for any changes    Addendum [id number]  Spoke to [facility] for preliminary read of CT abd/pelvis. Preliminary findings of a rectus sheath hematoma in the LUQ. Official read pending. Pt seen at bedside in NAD. He endorses similar pain to before.     ROS: No chest pain, palpitations, SOB, lightheadedness, dizziness, headache, nausea/vomiting, fevers/chills, dysuria or increased urinary frequency.    Vital Signs Last 24 Hrs  T(C): 36.9 (17 May 2022 21:27), Max: 36.9 (17 May 2022 15:50)  T(F): 98.4 (17 May 2022 21:27), Max: 98.4 (17 May 2022 15:50)  HR: 77 (18 May 2022 03:08) (62 - 84)  BP: 181/66 (18 May 2022 03:08) (125/62 - 181/66)  BP(mean): --  RR: 18 (18 May 2022 03:08) (18 - 18)  SpO2: 96% (17 May 2022 21:27) (96% - 96%)    ABDOMEN: Non-rigid, significant protruding LUQ mass, approximately 2x size of previous exam TTP, +guarding    NGT placed for decompression due to previous concern of SBO prior to CT read  - NPO until further recommendations    Surgery consulted  - Pt seen at bedside  - Advise abdominal binder  - See note    AC held in the setting of an acute bleed. He is currently hemodynamically stable. Risks of reversing AC outweigh the benefits. Pt at high risk of stroke due to hx of MVP.   - STAT T&S  -   - INR 2.43   - [id number] of 7  - CBC q4hrs  - Reverse AC PRN for downtrending h&h and/or hypotension  - Modified vitals q2hrs x 2 > Vitals q4hrs  - Consider AC reversal in the setting of down trending vitals and h&h    CT abd w/ IV contrast recommended. He is currently hemodynamically stable. Risks of IV contrast at this time outweigh the benefits. Pt w/ OMAYRA and fluid restricted.  - Monitor vitals and labs  - Imaging will be ordered in the setting of downtrending vitals and h&h    Case discussed w/ surgery team and hospitalist, Dr. Heller. RN called due to pt complaining of abd pain x 2 days. Pt reports intermittent LUQ pain w/ bloating. Pain is worsened w/ palpation and alleviated w/ defecation. He states that he has been passing gas and having formed BM's w/o blood. He denies n/v, CP, SOB, lightheadedness, pain out of proportion, and/or dizziness.  Of note, pt was admitted w/ abdominal fluid retention x 1 week prior to admission.    ROS: No chest pain, palpitations, SOB, lightheadedness, dizziness, headache, nausea/vomiting, fevers/chills, dysuria or increased urinary frequency.    Vital Signs Last 24 Hrs  T(C): 36.9 (17 May 2022 21:27), Max: 36.9 (17 May 2022 15:50)  T(F): 98.4 (17 May 2022 21:27), Max: 98.4 (17 May 2022 15:50)  HR: 62 (17 May 2022 21:27) (62 - 84)  BP: 152/65 (17 May 2022 21:27) (125/62 - 152/65)  BP(mean): --  RR: 18 (17 May 2022 21:27) (18 - 18)  SpO2: 96% (17 May 2022 21:27) (94% - 96%)    GENERAL: NAD, lying comfortably  HEAD: Atraumatic, Normocephalic  NERVOUS SYSTEM: Alert and awake, Good concentration  CHEST/LUNG: Clear to auscultation b/l; Unlabored respirations  HEART: Regular rate and rhythm  ABDOMEN: Non-rigid, generalized distention w/ noticeable LUQ distention, normoactive bowel sounds present in all quadrants, TTP w/ guarding, referred LUQ pain  SKIN: Warm and dry    - Urgent KUB w/ normal bowel gas patterns. Official read pending.  - Urgent CT abd/pelvis  - CBC  - Tylenol 650mg  - Simethicone 160mg   - VSS  - RN to notify for any changes    Addendum [id number]  Spoke to [facility] for preliminary read of CT abd/pelvis. Preliminary findings of a rectus sheath hematoma in the LUQ. Official read pending. Pt seen at bedside in NAD. He endorses similar pain to before.     ROS: No chest pain, palpitations, SOB, lightheadedness, dizziness, headache, nausea/vomiting, fevers/chills, dysuria or increased urinary frequency.    Vital Signs Last 24 Hrs  T(C): 36.9 (17 May 2022 21:27), Max: 36.9 (17 May 2022 15:50)  T(F): 98.4 (17 May 2022 21:27), Max: 98.4 (17 May 2022 15:50)  HR: 77 (18 May 2022 03:08) (62 - 84)  BP: 181/66 (18 May 2022 03:08) (125/62 - 181/66)  BP(mean): --  RR: 18 (18 May 2022 03:08) (18 - 18)  SpO2: 96% (17 May 2022 21:27) (96% - 96%)    ABDOMEN: Non-rigid, significant protruding LUQ mass, approximately 2x size of previous exam TTP, +guarding    NGT placed for decompression due to previous concern of SBO prior to CT read  - NPO until further recommendations    Surgery consulted  - Pt seen at bedside  - Advise abdominal binder  - See note    AC held in the setting of an acute bleed. He is currently hemodynamically stable. Risks of reversing AC outweigh the benefits. Pt at high risk of stroke due to hx of MVP.   - STAT T&S  - Blood consent signed  - INR 2.43   - [id number] of 7  - CBC q4hrs  - Reverse AC PRN for downtrending h&h and/or hypotension  - Modified vitals q2hrs x 2 > Vitals q4hrs  - Consider AC reversal in the setting of down trending vitals and h&h    CT abd w/ IV contrast recommended. He is currently hemodynamically stable. Risks of IV contrast at this time outweigh the benefits. Pt w/ OMAYRA and fluid restricted.  - Monitor vitals and labs  - Imaging will be ordered in the setting of downtrending vitals and h&h    Case discussed w/ surgery team and hospitalist, Dr. Heller.  Spoke to daughter Aida and given updates on new findings and current status.   Pt is currently hemodynamically stable.  RN to notify for any changes RN called due to pt complaining of abd pain x 2 days. Pt reports intermittent LUQ pain w/ bloating. Pain is worsened w/ palpation and alleviated w/ defecation. He states that he has been passing gas and having formed BM's w/o blood. He denies n/v, CP, SOB, lightheadedness, pain out of proportion, and/or dizziness.  Of note, pt was admitted w/ abdominal fluid retention x 1 week prior to admission.    ROS: No chest pain, palpitations, SOB, lightheadedness, dizziness, headache, nausea/vomiting, fevers/chills, dysuria or increased urinary frequency.    Vital Signs Last 24 Hrs  T(C): 36.9 (17 May 2022 21:27), Max: 36.9 (17 May 2022 15:50)  T(F): 98.4 (17 May 2022 21:27), Max: 98.4 (17 May 2022 15:50)  HR: 62 (17 May 2022 21:27) (62 - 84)  BP: 152/65 (17 May 2022 21:27) (125/62 - 152/65)  BP(mean): --  RR: 18 (17 May 2022 21:27) (18 - 18)  SpO2: 96% (17 May 2022 21:27) (94% - 96%)    GENERAL: NAD, lying comfortably  HEAD: Atraumatic, Normocephalic  NERVOUS SYSTEM: Alert and awake, Good concentration  CHEST/LUNG: Clear to auscultation b/l; Unlabored respirations  HEART: Regular rate and rhythm  ABDOMEN: Non-rigid, generalized distention w/ noticeable LUQ distention, normoactive bowel sounds present in all quadrants, TTP w/ guarding, referred LUQ pain  SKIN: Warm and dry    - Urgent KUB w/ normal bowel gas patterns. Official read pending.  - Urgent CT abd/pelvis  - CBC  - Tylenol 650mg  - Simethicone 160mg   - VSS  - RN to notify for any changes    Addendum [id number]  Spoke to [facility] for preliminary read of CT abd/pelvis. Preliminary findings of a rectus sheath hematoma in the LUQ. Official read pending. Pt seen at bedside in NAD. He endorses similar pain to before.     ROS: No chest pain, palpitations, SOB, lightheadedness, dizziness, headache, nausea/vomiting, fevers/chills, dysuria or increased urinary frequency.    Vital Signs Last 24 Hrs  T(C): 36.9 (17 May 2022 21:27), Max: 36.9 (17 May 2022 15:50)  T(F): 98.4 (17 May 2022 21:27), Max: 98.4 (17 May 2022 15:50)  HR: 77 (18 May 2022 03:08) (62 - 84)  BP: 181/66 (18 May 2022 03:08) (125/62 - 181/66)  BP(mean): --  RR: 18 (18 May 2022 03:08) (18 - 18)  SpO2: 96% (17 May 2022 21:27) (96% - 96%)    ABDOMEN: Non-rigid, significant protruding LUQ mass, approximately 2x size of previous exam TTP, +guarding    NGT placed for decompression due to previous concern of SBO prior to CT read  - NPO until further recommendations    Surgery consulted  - Pt seen at bedside  - Advise abdominal binder  - See note    AC held in the setting of an acute bleed. He is currently hemodynamically stable. Risks of reversing AC outweigh the benefits. Pt at high risk of stroke due to hx of MVR.   - STAT T&S  - Blood consent signed  - INR 2.43   - [id number] of 7  - CBC q4hrs  - Modified vitals q2hrs x 2 > Vitals q4hrs  - Consider AC reversal in the setting of down trending vitals and h&h    CT abd w/ IV contrast recommended. He is currently hemodynamically stable. Risks of IV contrast at this time outweigh the benefits. Pt w/ OMAYRA and fluid restricted.  - Monitor vitals and labs  - Imaging will be ordered in the setting of downtrending vitals and h&h    Case discussed w/ surgery team and hospitalist, Dr. Heller.  Spoke to daughter Aida and given updates on new findings and current status.   Pt is currently hemodynamically stable.  RN to notify for any changes

## 2022-05-17 NOTE — PHYSICAL THERAPY INITIAL EVALUATION ADULT - PHYSICAL ASSIST/NONPHYSICAL ASSIST: SUPINE/SIT, REHAB EVAL
.
required increased assistance to get bilateral LE's out of bed/assume upright sitting at EOB position + verbal cues for proper hand placement./1 person assist

## 2022-05-17 NOTE — DIETITIAN INITIAL EVALUATION ADULT - PERTINENT MEDS FT
MEDICATIONS  (STANDING):  albuterol/ipratropium for Nebulization 3 milliLiter(s) Nebulizer every 6 hours  atorvastatin 20 milliGRAM(s) Oral at bedtime  digoxin     Tablet 125 MICROGram(s) Oral daily  enoxaparin Injectable 90 milliGRAM(s) SubCutaneous every 12 hours  furosemide    Tablet 20 milliGRAM(s) Oral daily  hydrALAZINE 50 milliGRAM(s) Oral three times a day  lisinopril 20 milliGRAM(s) Oral daily  metoprolol tartrate 25 milliGRAM(s) Oral two times a day  spironolactone 25 milliGRAM(s) Oral daily    MEDICATIONS  (PRN):  guaiFENesin Oral Liquid (Sugar-Free) 200 milliGRAM(s) Oral every 6 hours PRN Cough  hydrocodone/homatropine Syrup 5 milliLiter(s) Oral every 6 hours PRN Cough

## 2022-05-17 NOTE — DIETITIAN INITIAL EVALUATION ADULT - ORAL INTAKE PTA/DIET HISTORY
Pt reports good po intake at meals; consumed almost 100% at breakfast per tray observation at bedside.  Pt reports good po intake prior to admission and denies wt loss.  Provided heart failure nutrition literature to patient in [place] per request.

## 2022-05-17 NOTE — DIETITIAN INITIAL EVALUATION ADULT - PERTINENT LABORATORY DATA
05-17    140  |  98  |  48.9<H>  ----------------------------<  88  3.8   |  29.0  |  1.75<H>    Ca    8.3<L>      17 May 2022 07:18  Phos  3.2     05-17  Mg     2.1     05-17

## 2022-05-17 NOTE — DIETITIAN INITIAL EVALUATION ADULT - NSICDXPASTSURGICALHX_GEN_ALL_CORE_FT
PAST SURGICAL HISTORY:  Chronic mitral valve disease replaced    H/O inguinal hernia left    Pacemaker cardiac

## 2022-05-17 NOTE — PHYSICAL THERAPY INITIAL EVALUATION ADULT - PERTINENT HX OF CURRENT PROBLEM, REHAB EVAL
pt was brought into the hospital 2/2 bilateral LE's edema and elevated INR of 6.5. pt has a hx of CVA with residual L sided weakness.

## 2022-05-17 NOTE — PHYSICAL THERAPY INITIAL EVALUATION ADULT - DISCHARGE DISPOSITION, PT EVAL
[facility] for strength, transfers, balance, endurance and ambulation/stair training pending progress and pending confirmation of family's ability/willingness to provide assistance upon d/c home, as pt is an increased falls risk, deeming pt unsafe to return home at this time.

## 2022-05-17 NOTE — PHYSICAL THERAPY INITIAL EVALUATION ADULT - CRITERIA FOR SKILLED THERAPEUTIC INTERVENTIONS
impairments found/functional limitations in following categories/risk reduction/prevention/rehab potential/therapy frequency/predicted duration of therapy intervention/anticipated equipment needs at discharge/anticipated discharge recommendation [place] Pregnancy And Lactation Text: This medication is Pregnancy Category C. It is unknown if this medication is excreted in breast milk.

## 2022-05-17 NOTE — PHYSICAL THERAPY INITIAL EVALUATION ADULT - PHYSICAL ASSIST/NONPHYSICAL ASSIST: SIT/SUPINE, REHAB EVAL
required increased assistance  to help get bilateral LE's into bed/assume proper semi-morley position + verbal cues for proper hand placement./1 person assist

## 2022-05-17 NOTE — PROGRESS NOTE ADULT - ASSESSMENT
81 year old male with PMH HTN, AF, CHFpEF, MR s/p mechanical MVR , CVA presented with dyspnea and LE edema.    BNP 3199, Troponin (-) x3, INR 5.9    Acute on chronic CHFpEF. Interval improvement with diuresis.  AF with supratherapeutic INR, downtrending but still elevated no obvious bleeding  TTE shows EF 50-55%, moderate AR and severe TR  - daily weights, strict I/O, fluid and salt restriction  - S/P IV Furosemide 40mg   -Lasix 20mg PO  - Hydralazine, ACEI  - Digoxin and BB for rate control  - Cardiology follow up  INR improving but not within range  cont warfarin 4mg from home dose 5mg   bridge with lovenox until inr 2.5 to 3.5    COVID Exposure 5/16  COVID negative on admission 5/11 and 5/16   No symptoms at this time    Elevated SCR; unclear if OMAYRA vs CKD.  - Monitor renal functions, avoid Nephrotoxins  Renal ultrasound no acute findings  Urine studies not collected  Creat improving    PAD, Aortic Aneurysm  - ASA, Statin  - Follow up with Vascular as outpatient    VTEp - lovenox/coumadin    Dispo: Remains acute  PT consult rec [facility]  Pending Lovenox bridge  Patient will need to quarantine is plan for [facility]  
81 year old male with PMH HTN, AF, CHFpEF, MR s/p mechanical MVR , CVA presented with dyspnea and LE edema.    BNP 3199, Troponin (-) x3, INR 5.9    Acute on chronic CHFpEF. Interval improvement with diuresis.  AF with supratherapeutic INR, downtrending but still elevated no obvious bleeding  TTE shows EF 50-55%, moderate AR and severe TR  - daily weights, strict I/O, fluid and salt restriction  - S/P IV Furosemide 40mg   -Start 20mg PO  - Hydralazine, ACEI  - Digoxin and BB for rate control  - Cardiology follow up  INR improving but not within range  cont warfarin 4mg from home dose 5mg   bridge with lovenox until inr 2.5 to 3.5    Elevated SCR; unclear if OMAYRA vs CKD.  - Monitor renal functions, avoid Nephrotoxins  Renal ultrasound no acute findings  Urine studies not collected  Creat improving    PAD, Aortic Aneurysm  - ASA, Statin  - Follow up with Vascular as outpatient    VTEp - lovenox/coumadin    Dispo: Remains acute  PT consult  Pending Lovenox bridge  Anticipate DC in 1-2 days
Assessment  HFpEF - predominant RHF improved  chronic Af with VVI PPM  Mech MVR with normal EF  mod AI   HTN  CRI      Rec  cont current meds  INR 2.5 - 3.5  trend electrolytes  will FU in office 1 week  pls recall as needed  
Assessment  RHF long standing, improved with diuresis  Mech MVR with normal function, mod AI, normal LVEF   Chronic Af with VVI pacing  HTN  CRI      Rec  target INR 2.5-3.5  cont aldactone   resume low dose lasix 20 mg day  cont hydralazine/ low dose ACEI /lopressor /dig  ? dc in am if renal function stable
81 year old male with PMH HTN, AF, CHFpEF, MR s/p mechanical MVR , CVA presented with dyspnea and LE edema.    BNP 3199, Troponin (-) x3, INR 5.9    Acute on chronic CHFpEF. Interval improvement with diuresis.  AF with supratherapeutic INR, downtrending but still elevated no obvious bleeding  TTE shows EF 50-55%, moderate AR and severe TR  - daily weights, strict I/O, fluid and salt restriction  - IV Furosemide 40mg to 40mg PO  - Hydralazine, ACEI  - Digoxin and BB for rate control  - Cardiology follow up  INR slightly increased  cont warfarin 4mg from home dose 5mg   bridge with lovenox until inr 2.5 to 3.5    Elevated SCR; unclear if OMAYRA vs CKD.  - Monitor renal functions, avoid Nephrotoxins  Renal ultrasound no acute findings  Urine studies not collected  Creat improving    PAD, Aortic Aneurysm  - ASA, Statin  - Follow up with Vascular as outpatient    VTEp - lovenox/coumadin    Dispo: Remains acute  PT consult  Pending Lovenox bridge  Anticipate DC in 1-2 days
81 year old male with PMH HTN, AF, CHFpEF, MR s/p mechanical MVR , CVA presented with dyspnea and LE edema.    BNP 3199, Troponin (-) x3, INR 5.9    Acute on chronic CHFpEF. Interval improvement with diuresis.  AF with supratherapeutic INR, downtrending but still elevated no obvious bleeding  TTE shows EF 50-55%, moderate AR and severe TR  - daily weights, strict I/O, fluid and salt restriction  - IV Furosemide 40mg twice daily  - Hydralazine, ACEI  - Digoxin and BB for rate control  - Cardiology follow up  IN downtrending  cont warfarin 4mg from home dose 5mg   bridge with lovenox until inr 2.5 to 3.5    Elevated SCR; unclear if OMAYRA vs CKD.  - Monitor renal functions, avoid Nephrotoxins  Renal ultrasound   Urine studies    PAD, Aortic Aneurysm  - ASA, Statin  - Follow up with Vascular as outpatient    VTEp - lovenox/coumadin    Dispo: Remains acute  
81 year old male with PMH HTN, AF, CHFpEF, MR s/p mechanical MVR , CVA presented with dyspnea and LE edema.    BNP 3199, Troponin (-) x3, INR 5.9    Acute on chronic CHFpEF. Interval improvement with diuresis.  AF with supratherapeutic INR, downtrending but still elevated no obvious bleeding  TTE shows EF 50-55%, moderate AR and severe TR  - daily weights, strict I/O, fluid and salt restriction  - IV Furosemide 40mg twice daily  - Hydralazine, ACEI  - Digoxin and BB for rate control  - Cardiology follow up  INR 2.3 this am  Restart warfarin tonight 5mg to 4mg. bridge with lovenox    Elevated SCR; unclear if OMAYRA vs CKD.  - Monitor renal functions, avoid Nephrotoxins    PAD, Aortic Aneurysm  - ASA, Statin  - Follow up with Vascular as outpatient    VTEp - lovenox/coumadin    Dispo: Remains acute    
Assessment  HFpEF with sig volume overload, predominant RHF  s/p mech MVR with normal function  mod AI normal EF  pul HTN with RHF  chronic AF with VVI PPM on AC      Rec  Cont IV lasix  add aldactone 25 mg day  cont lopresor/ACEI  trend lytes/BNP  INR 2.5-3.5  will follow  
81 year old male with PMH HTN, AF, CHFpEF, MR s/p mechanical MVR , CVA presented with dyspnea and LE edema.    BNP 3199, Troponin (-) x3, INR 5.9    Acute on chronic CHFpEF. Interval improvement with diuresis.  AF with supratherapeutic INR, downtrending but still elevated no obvious bleeding  TTE shows EF 50-55%, moderate AR and severe TR  - daily weights, strict I/O, fluid and salt restriction  - IV Furosemide 40mg twice daily  - Hydralazine, ACEI  - Digoxin and BB for rate control  - Hold Coumadin for today, INR in am  - Cardiology follow up    Elevated SCR; unclear if OMAYRA vs CKD.  - Monitor renal functions, avoid Nephrotoxins    PAD, Aortic Aneurysm  - ASA, Statin  - Follow up with Vascular as outpatient    VTEp - Elevated INR  Mobilize patient    Discussed with patient daughter Aida.

## 2022-05-18 NOTE — CONSULT NOTE ADULT - SUBJECTIVE AND OBJECTIVE BOX
ACUTE CARE SURGERY CONSULT    HPI:  81 year old man with history of HTN, CHF, CKDIII mechanical MVR, Afib, CVA with residual left sided deficits on coumadin, admitted for an exacerbation of CHF, patient on admission had a supra- therapeutic INR, coumadin was held, and then was restarted on coumadin with therapeutic lovenox. around midnight patient was complaining of abdominal pain, a CT scan was obtained and showed left sided rectus hematoma.    PAST MEDICAL HISTORY:  HTN (hypertension)    Mitral valve disease    CHF (congestive heart failure)    Respiratory insufficiency    Afib    CVA (cerebral vascular accident)        PAST SURGICAL HISTORY:  Chronic mitral valve disease    H/O inguinal hernia    Pacemaker        ALLERGIES:  No Known Allergies      FAMILY HISTORY: Noncontributory    SOCIAL HISTORY: Denies tobacco, EtOH, illicit substance use.     HOME MEDICATIONS:    MEDICATIONS  (STANDING):  atorvastatin 20 milliGRAM(s) Oral at bedtime  digoxin     Tablet 125 MICROGram(s) Oral daily  furosemide    Tablet 20 milliGRAM(s) Oral daily  hydrALAZINE 50 milliGRAM(s) Oral three times a day  lisinopril 20 milliGRAM(s) Oral daily  metoprolol tartrate 25 milliGRAM(s) Oral two times a day  spironolactone 25 milliGRAM(s) Oral daily    MEDICATIONS  (PRN):  ALBUTerol    90 MICROgram(s) HFA Inhaler 2 Puff(s) Inhalation every 6 hours PRN Bronchospasm  guaiFENesin Oral Liquid (Sugar-Free) 200 milliGRAM(s) Oral every 6 hours PRN Cough  hydrocodone/homatropine Syrup 5 milliLiter(s) Oral every 6 hours PRN Cough      VITALS & I/Os:  Vital Signs Last 24 Hrs  T(C): 36.9 (17 May 2022 21:27), Max: 36.9 (17 May 2022 15:50)  T(F): 98.4 (17 May 2022 21:27), Max: 98.4 (17 May 2022 15:50)  HR: 77 (18 May 2022 03:08) (62 - 84)  BP: 181/66 (18 May 2022 03:08) (125/62 - 181/66)  BP(mean): --  RR: 18 (18 May 2022 03:08) (18 - 18)  SpO2: 96% (17 May 2022 21:27) (96% - 96%)  CAPILLARY BLOOD GLUCOSE          I&O's Summary    16 May 2022 07:01  -  17 May 2022 07:00  --------------------------------------------------------  IN: 180 mL / OUT: 850 mL / NET: -670 mL    17 May 2022 07:01  -  18 May 2022 04:53  --------------------------------------------------------  IN: 0 mL / OUT: 1250 mL / NET: -1250 mL        GENERAL: Alert, in no acute distress.  MENTAL STATUS: AAOx3. Appropriate affect.  RESPIRATORY: unlabored breathing  CARDIOVASCULAR: RRR.   GASTROINTESTINAL: Abdomen soft, left sided abdominal tenderness no hematoma appreciated., ND, -R/-G.  No pulsatile mass,  LE: BL edema    LABS:                        10.2   6.89  )-----------( 155      ( 18 May 2022 03:02 )             32.2     05-17    140  |  98  |  48.9<H>  ----------------------------<  88  3.8   |  29.0  |  1.75<H>    Ca    8.3<L>      17 May 2022 07:18  Phos  3.2     05-17  Mg     2.1     05-17      Lactate: ALBUTerol    90 MICROgram(s) HFA Inhaler 2 Puff(s) Inhalation every 6 hours PRN  atorvastatin 20 milliGRAM(s) Oral at bedtime  digoxin     Tablet 125 MICROGram(s) Oral daily  furosemide    Tablet 20 milliGRAM(s) Oral daily  guaiFENesin Oral Liquid (Sugar-Free) 200 milliGRAM(s) Oral every 6 hours PRN  hydrALAZINE 50 milliGRAM(s) Oral three times a day  hydrocodone/homatropine Syrup 5 milliLiter(s) Oral every 6 hours PRN  lisinopril 20 milliGRAM(s) Oral daily  metoprolol tartrate 25 milliGRAM(s) Oral two times a day  spironolactone 25 milliGRAM(s) Oral daily     PT/INR - ( 17 May 2022 07:18 )   PT: 28.5 sec;   INR: 2.43 ratio                       IMAGING:  < from: CT Abdomen and Pelvis No Cont (05.18.22 @ 00:42) >  ******PRELIMINARY REPORT******      ******PRELIMINARY REPORT******       ACC: [id number] EXAM:  CT ABDOMEN AND PELVIS                          PROCEDURE DATE:  05/18/2022    ******PRELIMINARY REPORT******      ******PRELIMINARY REPORT******           INTERPRETATION:  Left rectus sheath hematoma.        ******PRELIMINARY REPORT******      ******PRELIMINARY REPORT******         AMANDA BURR MD; Attending Radiologist    < end of copied text >

## 2022-05-18 NOTE — CONSULT NOTE ADULT - ASSESSMENT
81 year old male admitted for the management of CHF exacerbation with multiple Comorbidities, on therapeutic lovenox to bridge him to coumadin, developed grade1 rectus sheath hematoma, Hgb 10.5>11>10.2, HDS, last anticoagulant dose was yesterday at 4pm, CT scan with no contrast, hard to appreciate active bleed, and duet to his acute on CKD primary team is holding off on getting a CTA.  Decision per primary team is not to reverse the patient for the risk of stroke.    Plan:  Continue close monitoring of vitals  Trend H/H  Abdominal binder  Hold anticoagulant  
Reinaldo on CKD stage III  CHFpEF  HTN  Afib s/p PPM  Mechanical MVR on coumadin    Evie DUMONT reviewed  SCr 1.2 in April, 1.3 on presentation now worsening in setting of IV diuretics  Now on po diuretics; Scr elevated/ stable  Renal US reviewed- no hydronephrosis, b/l cysts noted  Continue diuresis  nephrology follow up outpt

## 2022-05-18 NOTE — PROCEDURE NOTE - NSPOSTCAREGUIDE_GEN_A_CORE
Verbal/written post procedure instructions were given to patient/caregiver
Verbal/written post procedure instructions were given to patient/caregiver/Instructed patient/caregiver regarding signs and symptoms of infection
Verbal/written post procedure instructions were given to patient/caregiver/Instructed patient/caregiver regarding signs and symptoms of infection

## 2022-05-18 NOTE — RAPID RESPONSE TEAM SUMMARY - NSOTHERINTERVENTIONSRRT_GEN_ALL_CORE
Spoke to pt's daughter, Aida, on phone. She was updated on the pt's critical status. Wishes for her father to remain a full code and receive any and all medical treatment.  Spoke to pt's daughter, Aida, on phone. She was updated on the pt's critical status. Wishes for her father to remain a full code and receive any and all medical treatment at this time.

## 2022-05-18 NOTE — PROCEDURE NOTE - NSINDICATIONS_GEN_A_CORE
arterial puncture to obtain ABG's/critical patient/monitoring purposes
airway protection/critical patient
emergency venous access/venous access/volume resuscitation

## 2022-05-18 NOTE — PROCEDURE NOTE - NSPROCDETAILS_GEN_ALL_CORE
location identified, draped/prepped, sterile technique used, needle inserted/introduced/positive blood return obtained via catheter/connected to a pressurized flush line/sutured in place/hemostasis with direct pressure, dressing applied/Seldinger technique/all materials/supplies accounted for at end of procedure
patient pre-oxygenated, tube inserted, placement confirmed
guidewire recovered/lumen(s) aspirated and flushed/sterile dressing applied/sterile technique, catheter placed/ultrasound guidance with use of sterile gel and probe cove

## 2022-05-18 NOTE — PROGRESS NOTE ADULT - SUBJECTIVE AND OBJECTIVE BOX
[place] [facility]IOV[facility] - [facility], THE HEART CENTER                                   [address] [place] [address]                                                      PHONE: [phone]                                                         FAX: [phone]  [email or web address]  ---------------------------------------------------------------------------------------------------------------------------------    Overnight events/patient complaints: still edematous, less dyspneic      No Known Allergies    MEDICATIONS  (STANDING):  atorvastatin 20 milliGRAM(s) Oral at bedtime  digoxin     Tablet 125 MICROGram(s) Oral daily  furosemide   Injectable 40 milliGRAM(s) IV Push two times a day  hydrALAZINE 50 milliGRAM(s) Oral three times a day  lisinopril 20 milliGRAM(s) Oral daily  metoprolol tartrate 25 milliGRAM(s) Oral two times a day    MEDICATIONS  (PRN):  guaiFENesin Oral Liquid (Sugar-Free) 200 milliGRAM(s) Oral every 6 hours PRN Cough  hydrocodone/homatropine Syrup 5 milliLiter(s) Oral every 6 hours PRN Cough      Vital Signs Last 24 Hrs  T(C): 36.9 (13 May 2022 08:04), Max: 37.5 (12 May 2022 23:44)  T(F): 98.5 (13 May 2022 08:04), Max: 99.5 (12 May 2022 23:44)  HR: 60 (13 May 2022 08:04) (60 - 78)  BP: 152/58 (13 May 2022 08:04) (122/55 - 165/66)  BP(mean): --  RR: 18 (13 May 2022 08:04) (18 - 18)  SpO2: 96% (13 May 2022 08:04) (70% - 96%)  Daily     Daily   ICU Vital Signs Last 24 Hrs  ZANE SILVIA  I&O's Detail    12 May 2022 07:01  -  13 May 2022 07:00  --------------------------------------------------------  IN:    Oral Fluid: 960 mL  Total IN: 960 mL    OUT:    Voided (mL): 1400 mL  Total OUT: 1400 mL    Total NET: -440 mL        I&O's Summary    12 May 2022 07:01  -  13 May 2022 07:00  --------------------------------------------------------  IN: 960 mL / OUT: 1400 mL / NET: -440 mL      Drug Dosing Weight  ZANE VEGA      PHYSICAL EXAM:  General: Appears alert and cooperative.  HEENT: Head; normocephalic, atraumatic.  Eyes: Pupils reactive, cornea wnl.  Neck: Supple, no nodes adenopathy, pos JVD  CARDIOVASCULAR: Normal S1 and S2,3/6 systolic murmur at base  LUNGS: Basilar rales  ABDOMEN: Soft, nontender without mass or organomegaly. bowel sounds normoactive.  EXTREMITIES: 3+ edema/erythema  SKIN: warm and dry with normal turgor.  NEURO: Alert/oriented x 3/normal motor exam. No pathologic reflexes.    PSYCH: normal affect.        LABS:                        10.1   4.66  )-----------( 158      ( 12 May 2022 03:12 )             32.7     05-12    137  |  102  |  35.4<H>  ----------------------------<  81  4.5   |  21.0<L>  |  1.33<H>    Ca    8.6      12 May 2022 03:12    TPro  7.4  /  Alb  4.0  /  TBili  1.0  /  DBili  x   /  AST  55<H>  /  ALT  25  /  AlkPhos  175<H>  05-11    ZANE SUBIABARREIRO  CARDIAC MARKERS ( 12 May 2022 03:57 )  x     / <0.01 ng/mL / x     / x     / x      CARDIAC MARKERS ( 12 May 2022 03:12 )  x     / 0.01 ng/mL / x     / x     / x      CARDIAC MARKERS ( 11 May 2022 21:01 )  x     / <0.01 ng/mL / x     / x     / x          PT/INR - ( 12 May 2022 03:12 )   PT: 61.0 sec;   INR: 5.17 ratio         PTT - ( 12 May 2022 03:12 )  PTT:59.7 sec      RADIOLOGY & ADDITIONAL STUDIES:    INTERPRETATION OF TELEMETRY (personally reviewed):    ECG:< from: 12 Lead ECG (05.11.22 @ 23:32) >  Diagnosis Line *** Poor data quality, interpretation may be adversely affected  Ventricular-paced rhythm with occasional Premature ventricular complexes  Abnormal ECG    Confirmed by Lloyd Bowman ([id number]) on 5/12/2022 8:19:22 PM    < end of copied text >      ECHO:< from: TTE Echo Complete w/o Contrast w/ Doppler (05.12.22 @ 11:29) >  Summary:   1. Normal left ventricular internal cavity size.   2. Low normal global left ventricular systolic function.   3. Left ventricular ejection fraction, by visual estimation, is 50 to   55%.   4. Severely enlarged right atrium.   5. There is mild septal left ventricular hypertrophy.   6. Moderately enlarged left atrium.   7. Severely enlarged right ventricle.   8. Right ventricular volume and pressure overload and abnormal septal   motion consistent with RV pacemaker.   9. Sclerotic aortic valve with normal opening.  10. Moderate aortic regurgitation.  11. A mechanical valve is present in the mitral position.  12. Severe tricuspid regurgitation.  13. Mild pulmonic valve regurgitation.  14. Mitral valve mean gradient is 4.4 mmHg consistent with mild mitral   stenosis.  15. There is no evidence of pericardial effusion.    Maribel Maravilla MD Electronically signed on 5/12/2022 at 1:21:06 PM            *** Final **  
                [place] C[facility]IOVASCULAR - [facility], THE HEART CENTER                                   [address] [place] [address]                                                      PHONE: [phone]                                                         FAX: [phone]  [email or web address]  ---------------------------------------------------------------------------------------------------------------------------------    Overnight events/patient complaints:  Swelling improving.     No Known Allergies    MEDICATIONS  (STANDING):  albuterol/ipratropium for Nebulization 3 milliLiter(s) Nebulizer every 6 hours  atorvastatin 20 milliGRAM(s) Oral at bedtime  digoxin     Tablet 125 MICROGram(s) Oral daily  enoxaparin Injectable 90 milliGRAM(s) SubCutaneous every 12 hours  furosemide   Injectable 40 milliGRAM(s) IV Push two times a day  hydrALAZINE 50 milliGRAM(s) Oral three times a day  lisinopril 20 milliGRAM(s) Oral daily  metoprolol tartrate 25 milliGRAM(s) Oral two times a day  spironolactone 25 milliGRAM(s) Oral daily  warfarin 4 milliGRAM(s) Oral at bedtime    MEDICATIONS  (PRN):  guaiFENesin Oral Liquid (Sugar-Free) 200 milliGRAM(s) Oral every 6 hours PRN Cough  hydrocodone/homatropine Syrup 5 milliLiter(s) Oral every 6 hours PRN Cough      Vital Signs Last 24 Hrs  T(C): 36.9 (15 May 2022 09:00), Max: 37.1 (14 May 2022 15:38)  T(F): 98.4 (15 May 2022 09:00), Max: 98.7 (14 May 2022 15:38)  HR: 61 (15 May 2022 09:00) (59 - 82)  BP: 122/57 (15 May 2022 09:00) (122/57 - 161/62)  BP(mean): 95 (14 May 2022 15:38) (95 - 95)  RR: 22 (15 May 2022 09:00) (18 - 22)  SpO2: 98% (15 May 2022 09:00) (95% - 99%)  ICU Vital Signs Last 24 Hrs  ZANE VEGA  I&O's Detail    14 May 2022 07:01  -  15 May 2022 07:00  --------------------------------------------------------  IN:    Oral Fluid: 360 mL  Total IN: 360 mL    OUT:  Total OUT: 0 mL    Total NET: 360 mL      15 May 2022 07:01  -  15 May 2022 11:21  --------------------------------------------------------  IN:    Oral Fluid: 240 mL  Total IN: 240 mL    OUT:  Total OUT: 0 mL    Total NET: 240 mL        I&O's Summary    14 May 2022 07:01  -  15 May 2022 07:00  --------------------------------------------------------  IN: 360 mL / OUT: 0 mL / NET: 360 mL    15 May 2022 07:01  -  15 May 2022 11:21  --------------------------------------------------------  IN: 240 mL / OUT: 0 mL / NET: 240 mL      Drug Dosing Weight  ZANE VEGA      PHYSICAL EXAM:  General: Appears well developed, well nourished alert and cooperative.  HEENT: Normocephalic, atraumatic.  Eyes: Pupils reactive, cornea wnl.  Neck: Supple, no nodes adenopathy; no JVD, carotid bruit or thyromegaly.  CARDIOVASCULAR: Regular S1 S2   LUNGS: No rales, rhonchi or wheeze. Normal breath sounds bilaterally.  ABDOMEN: Soft, nontender without mass or organomegaly. bowel sounds normoactive.  EXTREMITIES: 1+ pitting edema in thighs. Distal pulses wnl.   SKIN: Warm and dry with normal turgor.  NEURO: Alert/oriented x 3/normal motor exam  PSYCH: Appropriate affect        LABS:                        11.3   4.09  )-----------( 168      ( 15 May 2022 08:49 )             36.2     05-15    139  |  96<L>  |  44.8<H>  ----------------------------<  76  3.7   |  28.0  |  1.37<H>    Ca    8.7      15 May 2022 08:49  Phos  3.5     05-15  Mg     1.7     05-15      ZANE VEGA      PT/INR - ( 15 May 2022 05:41 )   PT: 25.3 sec;   INR: 2.16 ratio               RADIOLOGY & ADDITIONAL STUDIES:    INTERPRETATION OF TELEMETRY (personally reviewed):  AF  60s    ASSESSMENT AND PLAN:  In summary, ZANE VEGA is an 81y Male with right sided CHF with LVEF 50-55%, [facility] MVR, s/p PPM, severe TR who presents with acute on chronic right heart failure. Volume status improving but remains overloaded    - BUN/Cr uptrending, may need to decrease lasix tomorrow  - Continue aldactone 25 mg daily  - Continue digoxin 125 mcg, metoprolol 25 bid, hydralazine 50 mg tid, lisionopril 20 mg daily  - Monitor electrolytes  - Continue coumadin with INR 2.5-3.5      
[facility] Division of Hospital Medicine    Chief Complaint:    SOB    SUBJECTIVE / OVERNIGHT EVENTS:  Continues to improve    Patient denies chest pain, SOB, abd pain, N/V, fever, chills, dysuria or any other complaints. All remainder ROS negative.     MEDICATIONS  (STANDING):  albuterol/ipratropium for Nebulization 3 milliLiter(s) Nebulizer every 6 hours  atorvastatin 20 milliGRAM(s) Oral at bedtime  digoxin     Tablet 125 MICROGram(s) Oral daily  enoxaparin Injectable 90 milliGRAM(s) SubCutaneous every 12 hours  hydrALAZINE 50 milliGRAM(s) Oral three times a day  lisinopril 20 milliGRAM(s) Oral daily  metoprolol tartrate 25 milliGRAM(s) Oral two times a day  spironolactone 25 milliGRAM(s) Oral daily  warfarin 4 milliGRAM(s) Oral at bedtime    MEDICATIONS  (PRN):  guaiFENesin Oral Liquid (Sugar-Free) 200 milliGRAM(s) Oral every 6 hours PRN Cough  hydrocodone/homatropine Syrup 5 milliLiter(s) Oral every 6 hours PRN Cough        I&O's Summary    14 May 2022 07:01  -  15 May 2022 07:00  --------------------------------------------------------  IN: 360 mL / OUT: 0 mL / NET: 360 mL    15 May 2022 07:01  -  15 May 2022 15:00  --------------------------------------------------------  IN: 240 mL / OUT: 0 mL / NET: 240 mL        PHYSICAL EXAM:  Vital Signs Last 24 Hrs  T(C): 36.9 (15 May 2022 09:00), Max: 37.1 (14 May 2022 15:38)  T(F): 98.4 (15 May 2022 09:00), Max: 98.7 (14 May 2022 15:38)  HR: 61 (15 May 2022 09:00) (59 - 82)  BP: 122/57 (15 May 2022 09:00) (122/57 - 161/62)  BP(mean): 95 (14 May 2022 15:38) (95 - 95)  RR: 22 (15 May 2022 09:00) (18 - 22)  SpO2: 98% (15 May 2022 09:00) (95% - 99%)        CONSTITUTIONAL: NAD, elderly  ENMT: Moist oral mucosa, no pharyngeal injection or exudates; normal dentition  RESPIRATORY: Normal respiratory effort; lungs are clear to auscultation bilaterally  CARDIOVASCULAR: Regular rate and rhythm, normal S1 and S2, no murmur/rub/gallop; Peripheral pulses are 2+ bilaterally  ABDOMEN: Nontender to palpation, normoactive bowel sounds, no rebound/guarding;   MUSCLOSKELETAL:  No clubbing or cyanosis of digits; no joint swelling or tenderness to palpation  PSYCH: A+O to person, place, and time; affect appropriate  NEUROLOGY: CN 2-12 are intact and symmetric; no gross sensory deficits;   SKIN: No rashes; no palpable lesions    LABS:                        11.3   4.09  )-----------( 168      ( 15 May 2022 08:49 )             36.2     05-15    139  |  96<L>  |  44.8<H>  ----------------------------<  76  3.7   |  28.0  |  1.37<H>    Ca    8.7      15 May 2022 08:49  Phos  3.5     05-15  Mg     1.7     05-15      PT/INR - ( 15 May 2022 05:41 )   PT: 25.3 sec;   INR: 2.16 ratio                   CAPILLARY BLOOD GLUCOSE            RADIOLOGY & ADDITIONAL TESTS:  Results Reviewed:   Imaging Personally Reviewed:  Electrocardiogram Personally Reviewed:                                          
[facility] Division of Hospital Medicine    Chief Complaint:    CHF    SUBJECTIVE / OVERNIGHT EVENTS:  Dyspnea improving. Continuing diuresis.    Patient denies chest pain abd pain, N/V, fever, chills, dysuria or any other complaints. All remainder ROS negative.     MEDICATIONS  (STANDING):  atorvastatin 20 milliGRAM(s) Oral at bedtime  digoxin     Tablet 125 MICROGram(s) Oral daily  enoxaparin Injectable 90 milliGRAM(s) SubCutaneous every 12 hours  furosemide   Injectable 40 milliGRAM(s) IV Push two times a day  hydrALAZINE 50 milliGRAM(s) Oral three times a day  lisinopril 20 milliGRAM(s) Oral daily  metoprolol tartrate 25 milliGRAM(s) Oral two times a day  spironolactone 25 milliGRAM(s) Oral daily  warfarin 4 milliGRAM(s) Oral at bedtime    MEDICATIONS  (PRN):  guaiFENesin Oral Liquid (Sugar-Free) 200 milliGRAM(s) Oral every 6 hours PRN Cough  hydrocodone/homatropine Syrup 5 milliLiter(s) Oral every 6 hours PRN Cough        I&O's Summary    13 May 2022 07:01  -  14 May 2022 07:00  --------------------------------------------------------  IN: 360 mL / OUT: 2350 mL / NET: -1990 mL        PHYSICAL EXAM:  Vital Signs Last 24 Hrs  T(C): 36.9 (14 May 2022 10:40), Max: 37.1 (13 May 2022 15:53)  T(F): 98.4 (14 May 2022 10:40), Max: 98.7 (13 May 2022 15:53)  HR: 62 (14 May 2022 10:40) (60 - 63)  BP: 137/62 (14 May 2022 10:40) (130/60 - 162/64)  BP(mean): 83 (13 May 2022 18:49) (83 - 83)  RR: 18 (14 May 2022 10:40) (18 - 20)  SpO2: 93% (14 May 2022 10:40) (93% - 97%)        CONSTITUTIONAL: NAD, edlerly  ENMT: Moist oral mucosa, no pharyngeal injection or exudates; normal dentition  RESPIRATORY: Normal respiratory effort;  NC with basilar crackles bilaterally  CARDIOVASCULAR: Regular rate and rhythm, normal S1 and S2, no murmur/rub/gallop; Peripheral pulses are 2+ bilaterally, + LE edema  ABDOMEN: Nontender to palpation, normoactive bowel sounds, no rebound/guarding;   MUSCLOSKELETAL:  No clubbing or cyanosis of digits; no joint swelling or tenderness to palpation  PSYCH: A+O to person, place, and time; affect appropriate  NEUROLOGY: CN 2-12 are intact and symmetric; no gross sensory deficits;   SKIN: No rashes; no palpable lesions    LABS:    05-14    141  |  99  |  x   ----------------------------<  x   4.2   |  27.0  |  x     Ca    9.0      13 May 2022 08:35      PT/INR - ( 14 May 2022 07:42 )   PT: 24.2 sec;   INR: 2.07 ratio                   CAPILLARY BLOOD GLUCOSE            RADIOLOGY & ADDITIONAL TESTS:  Results Reviewed:   Imaging Personally Reviewed:  Electrocardiogram Personally Reviewed:                                          
                [place] [facility]ARDIOV[facility], THE HEART CENTER                                   [address] [place] [address]                                                      PHONE: [phone]                                                         FAX: [phone]  [email or web address]  ---------------------------------------------------------------------------------------------------------------------------------    Overnight events/patient complaints: tele AF with VVI pacing, lying flat w/o dyspnea      No Known Allergies    MEDICATIONS  (STANDING):  albuterol/ipratropium for Nebulization 3 milliLiter(s) Nebulizer every 6 hours  atorvastatin 20 milliGRAM(s) Oral at bedtime  digoxin     Tablet 125 MICROGram(s) Oral daily  enoxaparin Injectable 90 milliGRAM(s) SubCutaneous every 12 hours  furosemide    Tablet 20 milliGRAM(s) Oral daily  hydrALAZINE 50 milliGRAM(s) Oral three times a day  lisinopril 20 milliGRAM(s) Oral daily  metoprolol tartrate 25 milliGRAM(s) Oral two times a day  spironolactone 25 milliGRAM(s) Oral daily    MEDICATIONS  (PRN):  guaiFENesin Oral Liquid (Sugar-Free) 200 milliGRAM(s) Oral every 6 hours PRN Cough  hydrocodone/homatropine Syrup 5 milliLiter(s) Oral every 6 hours PRN Cough      Vital Signs Last 24 Hrs  T(C): 36.7 (17 May 2022 04:11), Max: 37.1 (16 May 2022 10:00)  T(F): 98.1 (17 May 2022 04:11), Max: 98.8 (16 May 2022 10:00)  HR: 78 (17 May 2022 04:30) (56 - 78)  BP: 131/56 (17 May 2022 04:11) (112/55 - 165/75)  BP(mean): --  RR: 18 (17 May 2022 04:11) (18 - 19)  SpO2: 95% (17 May 2022 04:30) (94% - 99%)  Daily     Daily   ICU Vital Signs Last 24 Hrs  ZANE VEGA  I&O's Detail    16 May 2022 07:01  -  17 May 2022 07:00  --------------------------------------------------------  IN:    Oral Fluid: 180 mL  Total IN: 180 mL    OUT:    Voided (mL): 850 mL  Total OUT: 850 mL    Total NET: -670 mL        I&O's Summary    16 May 2022 07:01  -  17 May 2022 07:00  --------------------------------------------------------  IN: 180 mL / OUT: 850 mL / NET: -670 mL      Drug Dosing Weight  ZANE VEGA      PHYSICAL EXAM:  General: Appears alert and cooperative.  HEENT: Head; normocephalic, atraumatic.  Eyes: Pupils reactive, cornea wnl.  Neck: Supple, no nodes adenopathy, no NVD or carotid bruit or thyromegaly.  CARDIOVASCULAR: Mech S1 and normal S2, PAULETTE  LUNGS: No rales, rhonchi or wheeze. Normal breath sounds bilaterally.  ABDOMEN: Soft, nontender without mass or organomegaly. bowel sounds normoactive.  EXTREMITIES: No clubbing, cyanosis or edema. Distal pulses wnl.   SKIN: warm and dry with normal turgor.  NEURO: Alert/oriented x 3/normal motor exam. No pathologic reflexes.    PSYCH: normal affect.        LABS:                        10.5   4.21  )-----------( 147      ( 17 May 2022 07:18 )             33.4     05-17    140  |  98  |  48.9<H>  ----------------------------<  88  3.8   |  29.0  |  1.75<H>    Ca    8.3<L>      17 May 2022 07:18  Phos  3.2     05-17  Mg     2.1     05-17      ZANE GUSMANVISHNU      PT/INR - ( 17 May 2022 07:18 )   PT: 28.5 sec;   INR: 2.43 ratio               RADIOLOGY & ADDITIONAL STUDIES:    INTERPRETATION OF TELEMETRY (personally reviewed):    ECG:< from: 12 Lead ECG (05.11.22 @ 23:32) >  Diagnosis Line *** Poor data quality, interpretation may be adversely affected  Ventricular-paced rhythm with occasional Premature ventricular complexes  Abnormal ECG    Confirmed by Lloyd Bowman ([id number]) on 5/12/2022 8:19:22 PM    < end of copied text >      ECHO:< from: TTE Echo Complete w/o Contrast w/ Doppler (05.12.22 @ 11:29) >  Summary:   1. Normal left ventricular internal cavity size.   2. Low normal global left ventricular systolic function.   3. Left ventricular ejection fraction, by visual estimation, is 50 to   55%.   4. Severely enlarged right atrium.   5. There is mild septal left ventricular hypertrophy.   6. Moderately enlarged left atrium.   7. Severely enlarged right ventricle.   8. Right ventricular volume and pressure overload and abnormal septal   motion consistent with RV pacemaker.   9. Sclerotic aortic valve with normal opening.  10. Moderate aortic regurgitation.  11. A mechanical valve is present in the mitral position.  12. Severe tricuspid regurgitation.  13. Mild pulmonic valve regurgitation.  14. Mitral valve mean gradient is 4.4 mmHg consistent with mild mitral   stenosis.  15. There is no evidence of pericardial effusion.    Maribel Maravilla MD Electronically signed on 5/12/2022 at 1:21:06 PM            *** Final ***    < end of copied text >    
Asked to see patient emergently in the ICU suite by IT team at 1255 hrs.  for inability to control rendovascularly rectus sheath bleeding patient in hemorrhagic shock under MTP intubated earlier this am.    On my arrival patient is HD abnormal  on vasopressin bharti high dose of  norepinephrine with MAP 50's  Vented with adequate ventilation , normal PAP.  Abd is distended, dull to palpation without rebound tenderness.    Lactate 7.3  K 6.9  7.28/38/344    A/P Hemorrhagic shock, warfarin coagulopathy has been reversed with a current INR of 1.2.  At this time the patine does have abd hypertension but no signs of abd compartment syndrome, the hypotension and Darius are related to hemorrhagic shock, patient is being transfused.  with the inability to control bleeding in the rectus sheath via IR, operative intervention  would be required, This will require opening a previously tamponade space in the retroperitoneum, a possible open abdomen along with cardiac , pulmonary and neurologically sequelae from this prolonged shock state, with increase pain and suffering.  I presented this option to the family daughter Marybel in-person and rest of family over the phone and answered all questions, I also discuss the option of bringing the patient back to ICU and focus all efforts in comfort without any further invasive procedure.  Family opted for transition to comfort care, all questions answered to their satisfaction     This was discussed with primary surgeon consulted overnight, IR , anaesthesia and MICU team.      
IR Post Procedure Note    Diagnosis: Intraperitoneal/ Rectus bleeding    Procedure: Angiogram    : Jasbir Mixon MD    Contrast: 80cc    Anesthesia: 1% Lidocaine Subcutaneous, Sedation administered by Anesthesiology    Estimated Blood Loss: Less than 10cc    Complications: No Immediate Complications    Anticoagulation: Resume without Restriction/ Resume in 24 Hours    Findings & Plan: R CFA Access shows occluded Iliacs,  L CFA access also shows occluded iliacs. Unable to form reverse curve catheter, multiple microcatheters used to get access into L epigastric but unsuccessful. Procedure was terminated and ACS called when VS began worsening and belly became more distended as concern for abd compartment was developing. RCFA Access contained w hemostasis, L CFA groin access left in place      Please call Interventional Radiology with any questions, concerns, or issues. 
[facility] Division of Hospital Medicine    Chief Complaint:    81 year old male with PMH HTN, AF, CHFpEF, MR s/p mechanical MVR , CVA presented with dyspnea and LE edema.    BNP 3199, Troponin (-) x3, INR 5.9    Acute on chronic CHFpEF. Interval improvement with diuresis.  AF with supratherapeutic INR, downtrending but still elevated no obvious bleeding  TTE shows EF 50-55%, moderate AR and severe TR  - daily weights, strict I/O, fluid and salt restriction  - IV Furosemide 40mg twice daily  - Hydralazine, ACEI  - Digoxin and BB for rate control  - Hold Coumadin for today, INR in am  - Cardiology follow up    Elevated SCR; unclear if OMAYRA vs CKD.  - Monitor renal functions, avoid Nephrotoxins    PAD, Aortic Aneurysm  - ASA, Statin  - Follow up with Vascular as outpatient    VTEp - Elevated INR  Mobilize patient    Discussed with patient daughter Aida.  SUBJECTIVE / OVERNIGHT EVENTS:    Patient denies chest pain, SOB, abd pain, N/V, fever, chills, dysuria or any other complaints. All remainder ROS negative.     MEDICATIONS  (STANDING):  atorvastatin 20 milliGRAM(s) Oral at bedtime  digoxin     Tablet 125 MICROGram(s) Oral daily  furosemide   Injectable 40 milliGRAM(s) IV Push two times a day  hydrALAZINE 50 milliGRAM(s) Oral three times a day  lisinopril 20 milliGRAM(s) Oral daily  metoprolol tartrate 25 milliGRAM(s) Oral two times a day  spironolactone 25 milliGRAM(s) Oral daily  warfarin 4 milliGRAM(s) Oral at bedtime    MEDICATIONS  (PRN):  guaiFENesin Oral Liquid (Sugar-Free) 200 milliGRAM(s) Oral every 6 hours PRN Cough  hydrocodone/homatropine Syrup 5 milliLiter(s) Oral every 6 hours PRN Cough        I&O's Summary    12 May 2022 07:01  -  13 May 2022 07:00  --------------------------------------------------------  IN: 960 mL / OUT: 1400 mL / NET: -440 mL        PHYSICAL EXAM:  Vital Signs Last 24 Hrs  T(C): 37.5 (13 May 2022 11:25), Max: 37.5 (12 May 2022 23:44)  T(F): 99.5 (13 May 2022 11:25), Max: 99.5 (12 May 2022 23:44)  HR: 97 (13 May 2022 11:25) (60 - 97)  BP: 156/91 (13 May 2022 11:25) (122/55 - 165/66)  BP(mean): --  RR: 18 (13 May 2022 11:25) (18 - 18)  SpO2: 97% (13 May 2022 11:25) (70% - 97%)        CONSTITUTIONAL: NAD, well-developed  ENMT: Moist oral mucosa, no pharyngeal injection or exudates; normal dentition  RESPIRATORY: Normal respiratory effort; lungs are clear to auscultation bilaterally  CARDIOVASCULAR: Regular rate and rhythm, normal S1 and S2, no murmur/rub/gallop; Peripheral pulses are 2+ bilaterally  ABDOMEN: Nontender to palpation, normoactive bowel sounds, no rebound/guarding;   MUSCLOSKELETAL:  No clubbing or cyanosis of digits; no joint swelling or tenderness to palpation  PSYCH: A+O to person, place, and time; affect appropriate  NEUROLOGY: CN 2-12 are intact and symmetric; no gross sensory deficits;   SKIN: No rashes; no palpable lesions    LABS:                        10.1   4.66  )-----------( 158      ( 12 May 2022 03:12 )             32.7     05-13    137  |  100  |  38.6<H>  ----------------------------<  85  4.2   |  24.0  |  1.52<H>    Ca    9.0      13 May 2022 08:35    TPro  7.4  /  Alb  4.0  /  TBili  1.0  /  DBili  x   /  AST  55<H>  /  ALT  25  /  AlkPhos  175<H>  05-11    PT/INR - ( 13 May 2022 08:35 )   PT: 27.6 sec;   INR: 2.36 ratio         PTT - ( 12 May 2022 03:12 )  PTT:59.7 sec  CARDIAC MARKERS ( 12 May 2022 03:57 )  x     / <0.01 ng/mL / x     / x     / x      CARDIAC MARKERS ( 12 May 2022 03:12 )  x     / 0.01 ng/mL / x     / x     / x      CARDIAC MARKERS ( 11 May 2022 21:01 )  x     / <0.01 ng/mL / x     / x     / x              CAPILLARY BLOOD GLUCOSE            RADIOLOGY & ADDITIONAL TESTS:  Results Reviewed:   Imaging Personally Reviewed:  Electrocardiogram Personally Reviewed:                                          
                [place] CARDIOVASCULAR - [facility], THE [facility]                                   [address] [place] [address]                                                      PHONE: [phone]                                                         FAX: [phone]  [email or web address]  ---------------------------------------------------------------------------------------------------------------------------------    Overnight events/patient complaints:  Swelling improving.    No Known Allergies    MEDICATIONS  (STANDING):  atorvastatin 20 milliGRAM(s) Oral at bedtime  digoxin     Tablet 125 MICROGram(s) Oral daily  enoxaparin Injectable 90 milliGRAM(s) SubCutaneous every 12 hours  furosemide   Injectable 40 milliGRAM(s) IV Push two times a day  hydrALAZINE 50 milliGRAM(s) Oral three times a day  lisinopril 20 milliGRAM(s) Oral daily  metoprolol tartrate 25 milliGRAM(s) Oral two times a day  spironolactone 25 milliGRAM(s) Oral daily  warfarin 4 milliGRAM(s) Oral at bedtime    MEDICATIONS  (PRN):  guaiFENesin Oral Liquid (Sugar-Free) 200 milliGRAM(s) Oral every 6 hours PRN Cough  hydrocodone/homatropine Syrup 5 milliLiter(s) Oral every 6 hours PRN Cough      Vital Signs Last 24 Hrs  T(C): 36.7 (14 May 2022 05:25), Max: 37.5 (13 May 2022 11:25)  T(F): 98.1 (14 May 2022 05:25), Max: 99.5 (13 May 2022 11:25)  HR: 60 (14 May 2022 05:25) (60 - 97)  BP: 145/66 (14 May 2022 05:25) (130/60 - 162/64)  BP(mean): 83 (13 May 2022 18:49) (83 - 83)  RR: 19 (14 May 2022 05:25) (18 - 20)  SpO2: 97% (14 May 2022 05:25) (95% - 97%)  ICU Vital Signs Last 24 Hrs  ZANE SILVIA  I&O's Detail    13 May 2022 07:01  -  14 May 2022 07:00  --------------------------------------------------------  IN:    Oral Fluid: 360 mL  Total IN: 360 mL    OUT:    Voided (mL): 2350 mL  Total OUT: 2350 mL    Total NET: -1990 mL        I&O's Summary    13 May 2022 07:01  -  14 May 2022 07:00  --------------------------------------------------------  IN: 360 mL / OUT: 2350 mL / NET: -1990 mL      Drug Dosing Weight  ZANE SILVIA      PHYSICAL EXAM:  General: Appears well developed, well nourished alert and cooperative.  HEENT: Normocephalic, atraumatic.  Eyes: Pupils reactive, cornea wnl.  Neck: Supple, no nodes adenopathy; no JVD, carotid bruit or thyromegaly.  CARDIOVASCULAR: Regular S1 S2  LUNGS: No rales, rhonchi or wheeze. Normal breath sounds bilaterally.  ABDOMEN: Soft, nontender without mass or organomegaly. bowel sounds normoactive.  EXTREMITIES: No clubbing, cyanosis. 2+ edema up to thighs and abdomen. Distal pulses wnl.   SKIN: Warm and dry with normal turgor.  NEURO: Alert/oriented x 3/normal motor exam  PSYCH: Appropriate affect        LABS:    05-14    141  |  99  |  x   ----------------------------<  x   4.2   |  27.0  |  x     Ca    9.0      13 May 2022 08:35      ZANE VEGA      PT/INR - ( 14 May 2022 07:42 )   PT: 24.2 sec;   INR: 2.07 ratio               RADIOLOGY & ADDITIONAL STUDIES:    INTERPRETATION OF TELEMETRY (personally reviewed):   60s, intermittent conduction    ASSESSMENT AND PLAN:  In summary, ZANE VEGA is an 81y Male with right sided CHF with LVEF 50-55%, mech MVR, s/p PPM, severe TR who presents with acute on chronic right heart failure. Volume status improving but remains grossly overloaded    - Continue lasix 40 mg IV bid, aldactone 25 mg daily  - Continue digoxin 125 mcg, metoprolol 25 bid, hydralazine 50 mg tid, lisionopril 20 mg daily  - Monitor electrolytes  - Continue coumadin with INR 2.5-3.5    
                [place] CARDIOVASCULAR - [facility], THE HEART CENTER                                   [address] [place] [address]                                                      PHONE: [phone]                                                         FAX: [phone]  [email or web address]  ---------------------------------------------------------------------------------------------------------------------------------    Overnight events/patient complaints: tele AF with CVR occ PVCs, edema markedly improved      No Known Allergies    MEDICATIONS  (STANDING):  albuterol/ipratropium for Nebulization 3 milliLiter(s) Nebulizer every 6 hours  atorvastatin 20 milliGRAM(s) Oral at bedtime  digoxin     Tablet 125 MICROGram(s) Oral daily  enoxaparin Injectable 90 milliGRAM(s) SubCutaneous every 12 hours  hydrALAZINE 50 milliGRAM(s) Oral three times a day  lisinopril 20 milliGRAM(s) Oral daily  metoprolol tartrate 25 milliGRAM(s) Oral two times a day  spironolactone 25 milliGRAM(s) Oral daily    MEDICATIONS  (PRN):  guaiFENesin Oral Liquid (Sugar-Free) 200 milliGRAM(s) Oral every 6 hours PRN Cough  hydrocodone/homatropine Syrup 5 milliLiter(s) Oral every 6 hours PRN Cough      Vital Signs Last 24 Hrs  T(C): 36.9 (16 May 2022 05:30), Max: 36.9 (15 May 2022 15:25)  T(F): 98.4 (16 May 2022 05:30), Max: 98.4 (15 May 2022 15:25)  HR: 60 (16 May 2022 05:30) (60 - 82)  BP: 117/51 (16 May 2022 05:30) (107/71 - 156/74)  BP(mean): --  RR: 19 (16 May 2022 05:30) (19 - 20)  SpO2: 97% (16 May 2022 05:30) (95% - 99%)  Daily     Daily   ICU Vital Signs Last 24 Hrs  ZANE SILVIA  I&O's Detail    15 May 2022 07:01  -  16 May 2022 07:00  --------------------------------------------------------  IN:    Oral Fluid: 600 mL  Total IN: 600 mL    OUT:    Voided (mL): 1800 mL  Total OUT: 1800 mL    Total NET: -1200 mL        I&O's Summary    15 May 2022 07:01  -  16 May 2022 07:00  --------------------------------------------------------  IN: 600 mL / OUT: 1800 mL / NET: -1200 mL      Drug Dosing Weight  ZANE SILVIA      PHYSICAL EXAM:  General: Appears alert and cooperative.  HEENT: Head; normocephalic, atraumatic.  Eyes: Pupils reactive, cornea wnl.  Neck: Supple, no nodes adenopathy, no NVD or carotid bruit or thyromegaly.  CARDIOVASCULAR:Mech S1 normal S2 PAULETTE  LUNGS: No rales, rhonchi or wheeze. Normal breath sounds bilaterally.  ABDOMEN: Soft, nontender without mass or organomegaly. bowel sounds normoactive.  EXTREMITIES: No clubbing, cyanosis or edema. Distal pulses wnl.   SKIN: warm and dry with normal turgor.  NEURO: Alert/oriented x 3/normal motor exam. No pathologic reflexes.    PSYCH: normal affect.        LABS:                        10.5   4.49  )-----------( 172      ( 16 May 2022 07:50 )             33.3     05-16    142  |  98  |  47.3<H>  ----------------------------<  84  3.7   |  30.0<H>  |  1.65<H>    Ca    8.8      16 May 2022 07:50  Phos  3.8     05-16  Mg     1.8     05-16      ZANE SILVIA      PT/INR - ( 16 May 2022 07:50 )   PT: 27.9 sec;   INR: 2.38 ratio               RADIOLOGY & ADDITIONAL STUDIES:< from: Xray Chest 1 View- PORTABLE-Urgent (05.11.22 @ 21:29) >  ACC: [id number] EXAM:  XR CHEST PORTABLE URGENT 1V                          PROCEDURE DATE:  05/11/2022          INTERPRETATION:  Portable chest radiograph    CLINICAL INFORMATION: Chest pain    TECHNIQUE:  Portable  AP chest radiograph.    COMPARISON: 9/30/2021 chest .    FINDINGS:  CATHETERS AND TUBES: None    PULMONARY: The visualized lungs are clear of airspace consolidations or   effusions.   No pneumothorax.    HEART/VASCULAR: The  heart is enlarged in transverse diameter.  Status post cardiac valve replacement.  Cardiac device wire lead is within right ventricle.  .    BONES: Visualized osseous structures are intact.    IMPRESSION:   No radiographic evidence of active chest disease.    --- End of Report ---            JOSE ALEJANDRO VALLADARES MD; Attending Radiologist  This document has been electronically signed. May 12 2022 10:33AM    < end of copied text >      INTERPRETATION OF TELEMETRY (personally reviewed):    ECG:< from: 12 Lead ECG (05.11.22 @ 23:32) >    Diagnosis Line *** Poor data quality, interpretation may be adversely affected  Ventricular-paced rhythm with occasional Premature ventricular complexes  Abnormal ECG    Confirmed by Lloyd Bowman ([id number]) on 5/12/2022 8:19:22 PM    < end of copied text >      ECHO:< from: TTE Echo Complete w/o Contrast w/ Doppler (05.12.22 @ 11:29) >    Summary:   1. Normal left ventricular internal cavity size.   2. Low normal global left ventricular systolic function.   3. Left ventricular ejection fraction, by visual estimation, is 50 to   55%.   4. Severely enlarged right atrium.   5. There is mild septal left ventricular hypertrophy.   6. Moderately enlarged left atrium.   7. Severely enlarged right ventricle.   8. Right ventricular volume and pressure overload and abnormal septal   motion consistent with RV pacemaker.   9. Sclerotic aortic valve with normal opening.  10. Moderate aortic regurgitation.  11. A mechanical valve is present in the mitral position.  12. Severe tricuspid regurgitation.  13. Mild pulmonic valve regurgitation.  14. Mitral valve mean gradient is 4.4 mmHg consistent with mild mitral   stenosis.  15. There is no evidence of pericardial effusion.    Maribel Maravilla MD Electronically signed on 5/12/2022 at 1:21:06 PM            *** Final ***    < end of copied text >      STRESS TEST:    CARDIAC CATHETERIZATION:    ASSESSMENT AND PLAN:  In summary, ZANE VEGA is an 81y Male with past medical history significant for     
[facility] Division of Hospital Medicine    Chief Complaint:    SOB    SUBJECTIVE / OVERNIGHT EVENTS:  COVID Exposure 5/16  Patient not expressing symptoms   Patient denies chest pain, SOB, abd pain, N/V, fever, chills, dysuria or any other complaints. All remainder ROS negative.     MEDICATIONS  (STANDING):  albuterol/ipratropium for Nebulization 3 milliLiter(s) Nebulizer every 6 hours  atorvastatin 20 milliGRAM(s) Oral at bedtime  digoxin     Tablet 125 MICROGram(s) Oral daily  enoxaparin Injectable 90 milliGRAM(s) SubCutaneous every 12 hours  furosemide    Tablet 20 milliGRAM(s) Oral daily  hydrALAZINE 50 milliGRAM(s) Oral three times a day  lisinopril 20 milliGRAM(s) Oral daily  metoprolol tartrate 25 milliGRAM(s) Oral two times a day  spironolactone 25 milliGRAM(s) Oral daily    MEDICATIONS  (PRN):  guaiFENesin Oral Liquid (Sugar-Free) 200 milliGRAM(s) Oral every 6 hours PRN Cough  hydrocodone/homatropine Syrup 5 milliLiter(s) Oral every 6 hours PRN Cough        I&O's Summary    16 May 2022 07:01  -  17 May 2022 07:00  --------------------------------------------------------  IN: 180 mL / OUT: 850 mL / NET: -670 mL        PHYSICAL EXAM:  Vital Signs Last 24 Hrs  T(C): 36.7 (17 May 2022 04:11), Max: 37 (16 May 2022 21:30)  T(F): 98.1 (17 May 2022 04:11), Max: 98.6 (16 May 2022 21:30)  HR: 84 (17 May 2022 08:52) (67 - 84)  BP: 131/56 (17 May 2022 04:11) (112/55 - 165/75)  BP(mean): --  RR: 18 (17 May 2022 04:11) (18 - 18)  SpO2: 95% (17 May 2022 04:30) (94% - 99%)        CONSTITUTIONAL: NAD, elderly  ENMT: Moist oral mucosa, no pharyngeal injection or exudates; normal dentition  RESPIRATORY: Normal respiratory effort; lungs are clear to auscultation bilaterally  CARDIOVASCULAR: Regular rate and rhythm, normal S1 and S2, no murmur/rub/gallop; Peripheral pulses are 2+ bilaterally  ABDOMEN: Nontender to palpation, normoactive bowel sounds, no rebound/guarding;   MUSCLOSKELETAL:  No clubbing or cyanosis of digits; no joint swelling or tenderness to palpation  PSYCH: A+O to person, place, and time; affect appropriate  NEUROLOGY: CN 2-12 are intact and symmetric; no gross sensory deficits;   SKIN: No rashes; no palpable lesions    LABS:                        10.5   4.21  )-----------( 147      ( 17 May 2022 07:18 )             33.4     05-17    140  |  98  |  48.9<H>  ----------------------------<  88  3.8   |  29.0  |  1.75<H>    Ca    8.3<L>      17 May 2022 07:18  Phos  3.2     05-17  Mg     2.1     05-17      PT/INR - ( 17 May 2022 07:18 )   PT: 28.5 sec;   INR: 2.43 ratio                   CAPILLARY BLOOD GLUCOSE            RADIOLOGY & ADDITIONAL TESTS:  Results Reviewed:   Imaging Personally Reviewed:  Electrocardiogram Personally Reviewed:                                          
[facility] Division of Hospital Medicine    Chief Complaint:    SOB    SUBJECTIVE / OVERNIGHT EVENTS:  INR uptrending near range  Patient feels well.  Patient denies chest pain, SOB, abd pain, N/V, fever, chills, dysuria or any other complaints. All remainder ROS negative.     MEDICATIONS  (STANDING):  albuterol/ipratropium for Nebulization 3 milliLiter(s) Nebulizer every 6 hours  atorvastatin 20 milliGRAM(s) Oral at bedtime  digoxin     Tablet 125 MICROGram(s) Oral daily  enoxaparin Injectable 90 milliGRAM(s) SubCutaneous every 12 hours  hydrALAZINE 50 milliGRAM(s) Oral three times a day  lisinopril 20 milliGRAM(s) Oral daily  metoprolol tartrate 25 milliGRAM(s) Oral two times a day  spironolactone 25 milliGRAM(s) Oral daily    MEDICATIONS  (PRN):  guaiFENesin Oral Liquid (Sugar-Free) 200 milliGRAM(s) Oral every 6 hours PRN Cough  hydrocodone/homatropine Syrup 5 milliLiter(s) Oral every 6 hours PRN Cough        I&O's Summary    15 May 2022 07:01  -  16 May 2022 07:00  --------------------------------------------------------  IN: 600 mL / OUT: 1800 mL / NET: -1200 mL    16 May 2022 07:01  -  16 May 2022 15:46  --------------------------------------------------------  IN: 180 mL / OUT: 0 mL / NET: 180 mL        PHYSICAL EXAM:  Vital Signs Last 24 Hrs  T(C): 37.1 (16 May 2022 10:00), Max: 37.1 (16 May 2022 10:00)  T(F): 98.8 (16 May 2022 10:00), Max: 98.8 (16 May 2022 10:00)  HR: 62 (16 May 2022 10:00) (56 - 82)  BP: 134/68 (16 May 2022 10:00) (107/71 - 156/74)  BP(mean): --  RR: 19 (16 May 2022 10:00) (19 - 19)  SpO2: 98% (16 May 2022 10:00) (94% - 99%)        CONSTITUTIONAL: NAD, well-developed  ENMT: Moist oral mucosa, no pharyngeal injection or exudates; normal dentition  RESPIRATORY: Normal respiratory effort; lungs are clear to auscultation bilaterally  CARDIOVASCULAR: Regular rate and rhythm, normal S1 and S2, no murmur/rub/gallop; Peripheral pulses are 2+ bilaterally  ABDOMEN: Nontender to palpation, normoactive bowel sounds, no rebound/guarding;   MUSCLOSKELETAL:  No clubbing or cyanosis of digits; no joint swelling or tenderness to palpation  PSYCH: A+O to person, place, and time; affect appropriate  NEUROLOGY: CN 2-12 are intact and symmetric; no gross sensory deficits;   SKIN: No rashes; no palpable lesions    LABS:                        10.5   4.49  )-----------( 172      ( 16 May 2022 07:50 )             33.3     05-16    142  |  98  |  47.3<H>  ----------------------------<  84  3.7   |  30.0<H>  |  1.65<H>    Ca    8.8      16 May 2022 07:50  Phos  3.8     05-16  Mg     1.8     05-16      PT/INR - ( 16 May 2022 07:50 )   PT: 27.9 sec;   INR: 2.38 ratio                   CAPILLARY BLOOD GLUCOSE            RADIOLOGY & ADDITIONAL TESTS:  Results Reviewed:   Imaging Personally Reviewed:  Electrocardiogram Personally Reviewed:                                          
  HOSPITALIST PROGRESS NOTE    ZANE VEGA  [id number]  81yMale    Patient is a 81y old  Male who presents with a chief complaint of chf exacerbation (12 May 2022 08:57)      SUBJECTIVE:   Chart reviewed since admission.  Patient seen and examined at bedside for CHFpEF. MVR, supratherapeutic INR  Feels better; Denies any any chest pain, palpitations or dyspnea  Cough with white phlegm.      OBJECTIVE:  Vital Signs Last 24 Hrs  T(C): 36.8 (12 May 2022 07:54), Max: 36.8 (12 May 2022 07:54)  T(F): 98.3 (12 May 2022 07:54), Max: 98.3 (12 May 2022 07:54)  HR: 60 (12 May 2022 07:54) (60 - 74)  BP: 128/56 (12 May 2022 07:54) (128/56 - 153/67)   RR: 18 (12 May 2022 07:54) (18 - 20)  SpO2: 96% (12 May 2022 07:54) (95% - 99%)    PHYSICAL EXAMINATION  General: Elderly male lying in bed, NAD  HEENT:  No supplemental O2  NECK:  Supple  CVS: regular rate and rhythm S1 S2  RESP:  Fair air entry,   decreased sounds at bases  GI:  Soft nontender nondistended BS+  : No suprapubic tenderness  MSK:  FROM  CNS:  No gross focal or global deficit appreciated  INTEG:  warm dry skin  PSYCH:  Fair mood    MONITOR:  CAPILLARY BLOOD GLUCOSE            I&O's Summary    11 May 2022 07:01  -  12 May 2022 07:00  --------------------------------------------------------  IN: 0 mL / OUT: 1500 mL / NET: -1500 mL                            10.1   4.66  )-----------( 158      ( 12 May 2022 03:12 )             32.7     PT/INR - ( 12 May 2022 03:12 )   PT: 61.0 sec;   INR: 5.17 ratio         PTT - ( 12 May 2022 03:12 )  PTT:59.7 sec  05-12    137  |  102  |  35.4<H>  ----------------------------<  81  4.5   |  21.0<L>  |  1.33<H>    Ca    8.6      12 May 2022 03:12    TPro  7.4  /  Alb  4.0  /  TBili  1.0  /  DBili  x   /  AST  55<H>  /  ALT  25  /  AlkPhos  175<H>  05-11    CARDIAC MARKERS ( 12 May 2022 03:57 )  x     / <0.01 ng/mL / x     / x     / x      CARDIAC MARKERS ( 12 May 2022 03:12 )  x     / 0.01 ng/mL / x     / x     / x      CARDIAC MARKERS ( 11 May 2022 21:01 )  x     / <0.01 ng/mL / x     / x     / x              Culture:    TTE:    < from: TTE Echo Complete w/o Contrast w/ Doppler (05.12.22 @ 11:29) >    Summary:   1. Normal left ventricular internal cavity size.   2. Low normal global left ventricular systolic function.   3. Left ventricular ejection fraction, by visual estimation, is 50 to   55%.   4. Severely enlarged right atrium.   5. There is mild septal left ventricular hypertrophy.   6. Moderately enlarged left atrium.   7. Severely enlarged right ventricle.   8. Right ventricular volume and pressure overload and abnormal septal   motion consistent with RV pacemaker.   9. Sclerotic aortic valve with normal opening.  10. Moderate aortic regurgitation.  11. A mechanical valve is present in the mitral position.  12. Severe tricuspid regurgitation.  13. Mild pulmonic valve regurgitation.  14. Mitral valve mean gradient is 4.4 mmHg consistent with mild mitral   stenosis.  15. There is no evidence of pericardial effusion.    Maribel Maravilla MD Electronically signed on 5/12/2022 at 1:21:06 PM    < end of copied text >        MEDICATIONS  (STANDING):  atorvastatin 20 milliGRAM(s) Oral at bedtime  digoxin     Tablet 125 MICROGram(s) Oral daily  furosemide   Injectable 40 milliGRAM(s) IV Push two times a day  hydrALAZINE 50 milliGRAM(s) Oral three times a day  lisinopril 20 milliGRAM(s) Oral daily  metoprolol tartrate 25 milliGRAM(s) Oral two times a day      MEDICATIONS  (PRN):  guaiFENesin Oral Liquid (Sugar-Free) 200 milliGRAM(s) Oral every 6 hours PRN Cough

## 2022-05-18 NOTE — PROGRESS NOTE ADULT - PROVIDER SPECIALTY LIST ADULT
Hospitalist
Intervent Radiology
Surgery
Cardiology
Electrophysiology
Electrophysiology
Hospitalist
Cardiology
Cardiology
Hospitalist

## 2022-05-18 NOTE — RAPID RESPONSE TEAM SUMMARY - NSADDTLFINDINGSRRT_GEN_ALL_CORE
GENERAL: Critical  NERVOUS SYSTEM: Awake and lethargic  CHEST/LUNG: Unlabored respirations  HEART: Regular rate and rhythm  ABDOMEN: Non-rigid, significant protruding LUQ mass, approximately 2x size of previous exam TTP, +guarding GENERAL: Critical  NERVOUS SYSTEM: Awake and lethargic  CHEST/LUNG: Unlabored respirations  HEART: Regular rate and rhythm  ABDOMEN: Non-rigid, significant protruding LUQ mass, TTP, +guarding

## 2022-05-18 NOTE — DISCHARGE NOTE FOR THE EXPIRED PATIENT - HOSPITAL COURSE
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 On , course c/b abdominal pain, preliminary CT A/P read w/ rectus sheath hematoma. RRT called for hypotension, ABLA. Upgraded to MICU for hemorrhagic shock, ABLA 2/2 rectus sheath hematoma, OMAYRA, coagulopathy. Multiple MTPs called - pt received total of 8u PRBC, 3u FFP, 1u Plt, Vitamin K/KCentra x1. Brought to IR, bleed unable to be tamponaded and ACS was called to re-evaluate. Surgeon, MICU team spoke w/ family about diagnosis, prognosis, etc. Family elected to make patient DNR and comfort measures only. Noted to be asystolic on monitor w/o pulses,  at 14:10. Family at bedside - notified. Intensivist Dr. Albert aware.

## 2022-05-18 NOTE — PROCEDURE NOTE - NSINFORMCONSENT_GEN_A_CORE
This was an emergent procedure.
This was an emergent procedure.
Benefits, risks, and possible complications of procedure explained to patient/caregiver who verbalized understanding and gave verbal consent.
This was an emergent procedure.

## 2022-05-18 NOTE — RAPID RESPONSE TEAM SUMMARY - NSSITUATIONBACKGROUNDRRT_GEN_ALL_CORE
81 year old male with PMH HTN, AF, CHFpEF, MR s/p mechanical MVR , CVA presented with dyspnea and LE edema.    BNP 3199, Troponin (-) x3, INR 5.9.    RN called early in night due to pt complaining of abd pain x 2 days. Pt reports intermittent LUQ pain w/ bloating. Pain is worsened w/ palpation and alleviated w/ defecation. He states that he has been passing gas and having formed BM's w/o blood. He denies n/v, CP, SOB, lightheadedness, pain out of proportion, and/or dizziness.    RRT called due to hypotension and AMS.

## 2022-05-18 NOTE — PROCEDURE NOTE - ADDITIONAL PROCEDURE DETAILS
Dx:  1. Shock, hypovolemic 2/2  2. ABLA  3. Rectus Sheath Hematoma  4. Coagulopathy  5. OMAYRA on CKD  6. Hx of Mechanical MVR on Coumadin     Procedure performed independent of CC time spent.

## 2022-05-18 NOTE — CONSULT NOTE ADULT - SUBJECTIVE AND OBJECTIVE BOX
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 Pt started complaining of abdominal pain, CT A/P noncontrast performed 5/18 which showed Left sided rectus sheath hematoma on prelimary read. RRT called on 5/18 for hypotension. Hgb dropped from 10.2 to 7.8 in about three hours. MTP called, pt received total of 2u PRBC, started on Levophed infusion. MICU consulted for further management. RIJ Cordis placed.   Pt seen and examined at bedside - AAO, endorses abdominal pain. Denies CP, HA/dizziness, N/V/D.  R axillary A-line placed. Bedside cardiac performed w/ no gross LV dysfunction, no effusion.     PAST MEDICAL & SURGICAL HISTORY:  HTN (hypertension)  Mitral valve disease  CHF (congestive heart failure)  Respiratory insufficiency  Afib  CVA (cerebral vascular accident)  Chronic mitral valve disease  replaced  H/O inguinal hernia  left  Pacemaker  cardiac    Review of Systems:  CONSTITUTIONAL: No fever, chills, or fatigue  EYES: No eye pain, visual disturbances, or discharge  ENMT:  No difficulty hearing, tinnitus, vertigo; No sinus or throat pain  NECK: No pain or stiffness  RESPIRATORY: No cough, wheezing, chills or hemoptysis; No shortness of breath  CARDIOVASCULAR: No chest pain, palpitations, dizziness, or leg swelling  GASTROINTESTINAL: (+)abdominal pain. No nausea, vomiting, or hematemesis; No diarrhea or constipation. No melena or hematochezia.  GENITOURINARY: No dysuria, frequency, hematuria, or incontinence  NEUROLOGICAL: No headaches, memory loss, loss of strength, numbness, or tremors  SKIN: No itching, burning, rashes, or lesions   MUSCULOSKELETAL: No joint pain or swelling; No muscle, back, or extremity pain  PSYCHIATRIC: No depression, anxiety, mood swings, or difficulty sleeping    Medications:  digoxin     Tablet 125 MICROGram(s) Oral daily  furosemide   Injectable 40 milliGRAM(s) IV Push once  norepinephrine Infusion 0.1 MICROgram(s)/kG/Min IV Continuous <Continuous>  spironolactone 25 milliGRAM(s) Oral daily  ALBUTerol    90 MICROgram(s) HFA Inhaler 2 Puff(s) Inhalation every 6 hours PRN  guaiFENesin Oral Liquid (Sugar-Free) 200 milliGRAM(s) Oral every 6 hours PRN  hydrocodone/homatropine Syrup 5 milliLiter(s) Oral every 6 hours PRN  HYDROmorphone  Injectable 0.5 milliGRa(s) IV Push once  atorvastatin 20 milliGRAM(s) Oral at bedtime  chlorhexidine 4% Liquid 1 Application(s) Topical <User Schedule>    ICU Vital Signs Last 24 Hrs  T(C): 36.6 (18 May 2022 07:45), Max: 36.9 (17 May 2022 15:50)  T(F): 97.8 (18 May 2022 07:45), Max: 98.4 (17 May 2022 15:50)  HR: 90 (18 May 2022 08:25) (55 - 95)  BP: 103/45 (18 May 2022 08:25) (63/35 - 181/66)  BP(mean): 62 (18 May 2022 08:25) (48 - 91)  ABP: 106/45 (18 May 2022 08:25) (106/45 - 179/62)  ABP(mean): 60 (18 May 2022 08:25) (60 - 97)  RR: 22 (18 May 2022 08:25) (18 - 39)  SpO2: 100% (18 May 2022 08:25) (94% - 100%)    I&O's Detail  17 May 2022 07:01  -  18 May 2022 07:00  --------------------------------------------------------  IN:  Total IN: 0 mL  OUT:    Voided (mL): 1250 mL  Total OUT: 1250 mL  Total NET: -1250 mL    LABS:                      7.8    4.61  )-----------( 139      ( 18 May 2022 06:40 )             25.4     05-18  143  |  102  |  47.3<H>  ----------------------------<  157<H>  4.4   |  24.0  |  1.84<H>  Ca    8.0<L>      18 May 2022 06:40  Phos  3.9     05-18  Mg     2.2     05-18  TPro  6.2<L>  /  Alb  3.1<L>  /  TBili  1.4  /  DBili  x   /  AST  58<H>  /  ALT  24  /  AlkPhos  154<H>  05-18    CAPILLARY BLOOD GLUCOSE  POCT Blood Glucose.: 188 mg/dL (18 May 2022 05:58)    PT/INR - ( 18 May 2022 06:40 )   PT: 27.0 sec;   INR: 2.31 ratio    PTT - ( 18 May 2022 06:40 )  PTT:47.4 sec    CULTURES:      Physical Examination:  General: Ill/pale appearing elderly male.   HEENT: PERRL.  PULM: Clear to auscultation bilaterally.  CVS: s1/s2.  ABD: Soft, distended, TTP L side abd, normoactive bowel sounds.  EXT: No edema, nontender  SKIN: Warm and well perfused, no rashes noted.  NEURO: Alert, oriented, interactive, nonfocal    RADIOLOGY:   < from: CT Abdomen and Pelvis No Cont (05.18.22 @ 00:42) >  ******PRELIMINARY REPORT******    ACC: [id number] EXAM:  CT ABDOMEN AND PELVIS                        INTERPRETATION:  Left rectus sheath hematoma.           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 Pt started complaining of abdominal pain, CT A/P noncontrast performed 5/18 which showed Left sided rectus sheath hematoma on prelimary read. RRT called on 5/18 for hypotension. Hgb dropped from 10.2 to 7.8 in about three hours. MTP called, pt received total of 2u PRBC, started on Levophed infusion. MICU consulted for further management. RIJ Cordis placed.   Pt seen and examined at bedside - AAO, endorses abdominal pain. Denies CP, HA/dizziness, N/V/D.  R axillary A-line placed. Bedside cardiac performed w/ no gross LV dysfunction, no RV strain, no effusion.     PAST MEDICAL & SURGICAL HISTORY:  HTN (hypertension)  Mitral valve disease  CHF (congestive heart failure)  Respiratory insufficiency  Afib  CVA (cerebral vascular accident)  Chronic mitral valve disease  replaced  H/O inguinal hernia  left  Pacemaker  cardiac    Review of Systems:  CONSTITUTIONAL: No fever, chills, or fatigue  EYES: No eye pain, visual disturbances, or discharge  ENMT:  No difficulty hearing, tinnitus, vertigo; No sinus or throat pain  NECK: No pain or stiffness  RESPIRATORY: No cough, wheezing, chills or hemoptysis; No shortness of breath  CARDIOVASCULAR: No chest pain, palpitations, dizziness, or leg swelling  GASTROINTESTINAL: (+)abdominal pain. No nausea, vomiting, or hematemesis; No diarrhea or constipation. No melena or hematochezia.  GENITOURINARY: No dysuria, frequency, hematuria, or incontinence  NEUROLOGICAL: No headaches, memory loss, loss of strength, numbness, or tremors  SKIN: No itching, burning, rashes, or lesions   MUSCULOSKELETAL: No joint pain or swelling; No muscle, back, or extremity pain  PSYCHIATRIC: No depression, anxiety, mood swings, or difficulty sleeping    Medications:  digoxin     Tablet 125 MICROGram(s) Oral daily  furosemide   Injectable 40 milliGRAM(s) IV Push once  norepinephrine Infusion 0.1 MICROgram(s)/kG/Min IV Continuous <Continuous>  spironolactone 25 milliGRAM(s) Oral daily  ALBUTerol    90 MICROgram(s) HFA Inhaler 2 Puff(s) Inhalation every 6 hours PRN  guaiFENesin Oral Liquid (Sugar-Free) 200 milliGRAM(s) Oral every 6 hours PRN  hydrocodone/homatropine Syrup 5 milliLiter(s) Oral every 6 hours PRN  HYDROmorphone  Injectable 0.5 milliGRa(s) IV Push once  atorvastatin 20 milliGRAM(s) Oral at bedtime  chlorhexidine 4% Liquid 1 Application(s) Topical <User Schedule>    ICU Vital Signs Last 24 Hrs  T(C): 36.6 (18 May 2022 07:45), Max: 36.9 (17 May 2022 15:50)  T(F): 97.8 (18 May 2022 07:45), Max: 98.4 (17 May 2022 15:50)  HR: 90 (18 May 2022 08:25) (55 - 95)  BP: 103/45 (18 May 2022 08:25) (63/35 - 181/66)  BP(mean): 62 (18 May 2022 08:25) (48 - 91)  ABP: 106/45 (18 May 2022 08:25) (106/45 - 179/62)  ABP(mean): 60 (18 May 2022 08:25) (60 - 97)  RR: 22 (18 May 2022 08:25) (18 - 39)  SpO2: 100% (18 May 2022 08:25) (94% - 100%)    I&O's Detail  17 May 2022 07:01  -  18 May 2022 07:00  --------------------------------------------------------  IN:  Total IN: 0 mL  OUT:    Voided (mL): 1250 mL  Total OUT: 1250 mL  Total NET: -1250 mL    LABS:                      7.8    4.61  )-----------( 139      ( 18 May 2022 06:40 )             25.4     05-18  143  |  102  |  47.3<H>  ----------------------------<  157<H>  4.4   |  24.0  |  1.84<H>  Ca    8.0<L>      18 May 2022 06:40  Phos  3.9     05-18  Mg     2.2     05-18  TPro  6.2<L>  /  Alb  3.1<L>  /  TBili  1.4  /  DBili  x   /  AST  58<H>  /  ALT  24  /  AlkPhos  154<H>  05-18    CAPILLARY BLOOD GLUCOSE  POCT Blood Glucose.: 188 mg/dL (18 May 2022 05:58)    PT/INR - ( 18 May 2022 06:40 )   PT: 27.0 sec;   INR: 2.31 ratio    PTT - ( 18 May 2022 06:40 )  PTT:47.4 sec    CULTURES:      Physical Examination:  General: Ill/pale appearing elderly male.   HEENT: PERRL.  PULM: Clear to auscultation bilaterally.  CVS: s1/s2.  ABD: Soft, distended, TTP L side abd, normoactive bowel sounds.  EXT: No edema, nontender  SKIN: Warm and well perfused, no rashes noted.  NEURO: Alert, oriented, interactive, nonfocal    RADIOLOGY:   < from: CT Abdomen and Pelvis No Cont (05.18.22 @ 00:42) >  ******PRELIMINARY REPORT******    ACC: [id number] EXAM:  CT ABDOMEN AND PELVIS                        INTERPRETATION:  Left rectus sheath hematoma.        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 Course c/b abdominal pain, prelimary CT A/P read w/ rectus sheath hematoma. RRT called for hypotension, ABLA. Upgraded to MICU.  Assessment:  1. Shock, hypovolemic 2/2  2. ABLA  3. Rectus Sheath Hematoma  4. Coagulopathy  5. OMAYRA on CKD  6. Hx of Mechanical MVR on Coumadin     Plan:  NEURO:  -Monitor mental status closely. Analgesia PRN.    CV:  -Started on Levophed infusion to maintain goal MAP >65. Actively titrating for MAP >65 monitoring end points of perfusion.  -Keep K~4 and Mg>2 for optimal arrhythmia suppression.  -Official TTE w/ EF 50-55%. Bedside cardiac POCUS w/ no gross LV dysfunction, no RV strain, or effusion.   -Statin, Spirnolactone.    RESP:  -Satting well on NC. Goal SpO2 >92%.     RENAL:  -Hx of CKD, SCr uptrending.   -trend lytes/Scr daily with BMP  -I's and O's, goal UOP 0.5 cc/kg/hr  -renal dose meds and avoid nephrotoxins     GI:  -NPO.    ENDO:  -Aggressive glycemic control to limit FS glucose to <180mg/dl. BS WNL.    ID:  -Afebrile, no leukocytosis.    HEME:  -S/p 2u PRBC during RRT. Additional 2u PRBC given. CT A/P noncontrast w/ rectus sheath hematoma. Surgery following, IR consulted. IR requesting reversal of INR prior to any intervention, will give 2u FFP for slow reversal, hold off on Vitamin K as patient has hx of mechanical MVR. CTA A/P ordered. Serial CBCs q4-6, transfuse for Hgb <7 or if hemodynamically unstable.   -DVT ppx w/ SCDs.     DISPO: D/w ICU intensivist Dr. Albert. Will admit to MICU.    CRITICAL CARE TIME SPENT:  60 minutes of critical care time spent providing medical care for patient's acute illness/conditions that impairs at least one vital organ system and/or poses a high risk of imminent or life threatening deterioration in the patient's condition. It includes time spent evaluating and treating the patient's acute illness as well as time spent reviewing labs, radiology, discussing goals of care with patient and/or patient's family, and discussing the case with a multidisciplinary team, including the eICU, in an effort to prevent further life threatening deterioration or end organ damage. This time is independent of any procedures performed.

## 2022-05-18 NOTE — CHART NOTE - NSCHARTNOTEFT_GEN_A_CORE
Patient now in hemorrhagic shock, escalating pressor doses.  Giving additional blood/FFP/platelets  Will fully reverse INR with Kcentra despite risk of stroke, risks of hemorrhage currently outweigh risk of stroke.

## 2022-05-18 NOTE — PROGRESS NOTE ADULT - REASON FOR ADMISSION
chf exacerbation

## 2022-05-18 NOTE — PROCEDURE NOTE - ADDITIONAL PROCEDURE DETAILS
Dx:      Procedure performed independent of CC time spent. Dx:  1. Shock, hypovolemic 2/2  2. ABLA  3. Rectus Sheath Hematoma  4. Coagulopathy  5. OMAYRA on CKD  6. Hx of Mechanical MVR on Coumadin     Procedure performed independent of CC time spent.

## 2022-05-18 NOTE — PROCEDURE NOTE - ADDITIONAL PROCEDURE DETAILS
Dx: acute blood loss anemia, hypovolemic/hemorrhagic shock, chronic systolic heart failure, rectus sheath hematoma  Procedural time noninclusive of other E/M time

## 2022-06-14 ENCOUNTER — APPOINTMENT (OUTPATIENT)
Dept: NEUROLOGY | Facility: CLINIC | Age: 82
End: 2022-06-14

## 2022-06-27 ENCOUNTER — APPOINTMENT (OUTPATIENT)
Dept: GASTROENTEROLOGY | Facility: CLINIC | Age: 82
End: 2022-06-27

## 2022-07-06 ENCOUNTER — APPOINTMENT (OUTPATIENT)
Dept: VASCULAR SURGERY | Facility: CLINIC | Age: 82
End: 2022-07-06

## 2023-02-08 NOTE — ED PROVIDER NOTE - ATTENDING CONTRIBUTION TO CARE
Initial (On Arrival) seen with resident: pleasant elderly male with right sided neck swelling with tender nodule for 2 weeks, no relief with PMD r'xs for antibiotics; on exam, NAD, well appearing, no trismus, no stridor, +fully patent airwau; right submandibular region with 2.5cnm approximately mildly tender nodule with no overlyuing skin changes or surrounding swelling; labs and imaging as noted; agree with antibx and sialologues, and ok for ENT f/u

## 2023-04-04 NOTE — ED PROVIDER NOTE - COVID-19 ORDERING FACILITY
cough fevers/headache
[facility]  [facility]

## 2023-04-17 NOTE — ED ADULT TRIAGE NOTE - BP NONINVASIVE SYSTOLIC (MM HG)
189 Elliptical Excision Additional Text (Leave Blank If You Do Not Want): The margin was drawn around the clinically apparent lesion.  An elliptical shape was then drawn on the skin incorporating the lesion and margins.  Incisions were then made along these lines to the appropriate tissue plane and the lesion was extirpated.

## 2023-06-01 NOTE — ED PROVIDER NOTE - PRINCIPAL DIAGNOSIS
Chief Complaint   Patient presents with     bladder cancer   talk about the results of the bladder surg and also the next step for treatment .  Frde Chan, clinic assistant  
Chest pain

## 2024-04-14 NOTE — ED PROVIDER NOTE - AGGRAVATING FACTORS
April 19, 2024         Luz IVAN  [facility]  [place] [address]  Phone: [phone]  Fax: [phone]       Patient: Roly Candelaria   YOB: 1987  Date of Visit: 04/19/2024    To Whom It May Concern:    Abe Candelarai  was at [facility] from 04/14/24. The patient may return to work on *** {With/no:14174} restrictions. If you have any questions or concerns, or if I can be of further assistance, please do not hesitate to contact me.    Sincerely,    Jen Gallego RN      movement

## 2024-05-21 NOTE — PROGRESS NOTE ADULT - PROBLEM SELECTOR PROBLEM 1
No. CIERA screening performed.  STOP BANG Legend: 0-2 = LOW Risk; 3-4 = INTERMEDIATE Risk; 5-8 = HIGH Risk
Diastolic CHF, acute on chronic
